# Patient Record
Sex: MALE | Race: WHITE | Employment: FULL TIME | URBAN - METROPOLITAN AREA
[De-identification: names, ages, dates, MRNs, and addresses within clinical notes are randomized per-mention and may not be internally consistent; named-entity substitution may affect disease eponyms.]

---

## 2017-01-08 DIAGNOSIS — E78.5 HYPERLIPIDEMIA: ICD-10-CM

## 2017-01-09 RX ORDER — METOPROLOL SUCCINATE 100 MG/1
TABLET, EXTENDED RELEASE ORAL
Qty: 90 TABLET | Refills: 1 | Status: SHIPPED | OUTPATIENT
Start: 2017-01-09 | End: 2017-09-24 | Stop reason: SDUPTHER

## 2017-01-09 RX ORDER — ATORVASTATIN CALCIUM 20 MG/1
TABLET, FILM COATED ORAL
Qty: 90 TABLET | Refills: 1 | Status: SHIPPED | OUTPATIENT
Start: 2017-01-09 | End: 2017-09-25 | Stop reason: SDUPTHER

## 2017-01-09 RX ORDER — TAMSULOSIN HYDROCHLORIDE 0.4 MG/1
CAPSULE ORAL
Qty: 30 CAPSULE | Refills: 5 | Status: SHIPPED | OUTPATIENT
Start: 2017-01-09 | End: 2017-09-25 | Stop reason: SDUPTHER

## 2017-05-18 ENCOUNTER — HOSPITAL ENCOUNTER (OUTPATIENT)
Dept: ORTHOPEDIC SURGERY | Age: 62
Discharge: HOME | End: 2017-05-18
Payer: COMMERCIAL

## 2017-05-18 DIAGNOSIS — M17.12 LOCALIZED PRIMARY OSTEOARTHRITIS OF LOWER LEG, LEFT: ICD-10-CM

## 2017-05-18 PROCEDURE — 73562 X-RAY EXAM OF KNEE 3: CPT

## 2017-06-18 DIAGNOSIS — J04.0 LARYNGITIS FROM REFLUX OF STOMACH ACID: ICD-10-CM

## 2017-06-18 DIAGNOSIS — K21.9 LARYNGITIS FROM REFLUX OF STOMACH ACID: ICD-10-CM

## 2017-06-19 RX ORDER — OMEPRAZOLE 20 MG/1
CAPSULE, DELAYED RELEASE ORAL
Qty: 270 CAPSULE | Refills: 1 | Status: SHIPPED | OUTPATIENT
Start: 2017-06-19 | End: 2018-02-25 | Stop reason: SDUPTHER

## 2017-06-19 RX ORDER — CELECOXIB 200 MG/1
CAPSULE ORAL
Qty: 180 CAPSULE | Refills: 1 | Status: SHIPPED | OUTPATIENT
Start: 2017-06-19 | End: 2018-02-22 | Stop reason: ALTCHOICE

## 2017-08-24 ENCOUNTER — CARE COORDINATION (OUTPATIENT)
Dept: CARE COORDINATION | Age: 62
End: 2017-08-24

## 2017-08-28 ENCOUNTER — CARE COORDINATION (OUTPATIENT)
Dept: CARE COORDINATION | Age: 62
End: 2017-08-28

## 2017-09-25 ENCOUNTER — OFFICE VISIT (OUTPATIENT)
Dept: FAMILY MEDICINE CLINIC | Age: 62
End: 2017-09-25

## 2017-09-25 VITALS
HEART RATE: 72 BPM | RESPIRATION RATE: 14 BRPM | DIASTOLIC BLOOD PRESSURE: 80 MMHG | WEIGHT: 200 LBS | HEIGHT: 68 IN | TEMPERATURE: 98.1 F | SYSTOLIC BLOOD PRESSURE: 124 MMHG | BODY MASS INDEX: 30.31 KG/M2

## 2017-09-25 DIAGNOSIS — Z12.5 PROSTATE CANCER SCREENING: ICD-10-CM

## 2017-09-25 DIAGNOSIS — Z12.11 SCREENING FOR COLORECTAL CANCER: ICD-10-CM

## 2017-09-25 DIAGNOSIS — I10 ESSENTIAL HYPERTENSION: Primary | ICD-10-CM

## 2017-09-25 DIAGNOSIS — Z12.12 SCREENING FOR COLORECTAL CANCER: ICD-10-CM

## 2017-09-25 DIAGNOSIS — E78.5 HYPERLIPIDEMIA, UNSPECIFIED HYPERLIPIDEMIA TYPE: ICD-10-CM

## 2017-09-25 DIAGNOSIS — E78.2 MIXED HYPERLIPIDEMIA: ICD-10-CM

## 2017-09-25 DIAGNOSIS — I10 ESSENTIAL HYPERTENSION: ICD-10-CM

## 2017-09-25 DIAGNOSIS — I50.9 CONGESTIVE HEART FAILURE, UNSPECIFIED CONGESTIVE HEART FAILURE CHRONICITY, UNSPECIFIED CONGESTIVE HEART FAILURE TYPE: ICD-10-CM

## 2017-09-25 DIAGNOSIS — K21.9 GASTROESOPHAGEAL REFLUX DISEASE, ESOPHAGITIS PRESENCE NOT SPECIFIED: ICD-10-CM

## 2017-09-25 DIAGNOSIS — G47.33 OBSTRUCTIVE SLEEP APNEA SYNDROME: ICD-10-CM

## 2017-09-25 DIAGNOSIS — F32.A DEPRESSION, UNSPECIFIED DEPRESSION TYPE: ICD-10-CM

## 2017-09-25 LAB
ALBUMIN SERPL-MCNC: 4.3 G/DL (ref 3.9–4.9)
ALP BLD-CCNC: 84 U/L (ref 35–104)
ALT SERPL-CCNC: 24 U/L (ref 0–41)
ANION GAP SERPL CALCULATED.3IONS-SCNC: 14 MEQ/L (ref 7–13)
AST SERPL-CCNC: 23 U/L (ref 0–40)
BILIRUB SERPL-MCNC: 0.4 MG/DL (ref 0–1.2)
BUN BLDV-MCNC: 25 MG/DL (ref 8–23)
CALCIUM SERPL-MCNC: 9.5 MG/DL (ref 8.6–10.2)
CHLORIDE BLD-SCNC: 105 MEQ/L (ref 98–107)
CHOLESTEROL, TOTAL: 144 MG/DL (ref 0–199)
CO2: 25 MEQ/L (ref 22–29)
CREAT SERPL-MCNC: 0.79 MG/DL (ref 0.7–1.2)
GFR AFRICAN AMERICAN: >60
GFR NON-AFRICAN AMERICAN: >60
GLOBULIN: 2.3 G/DL (ref 2.3–3.5)
GLUCOSE BLD-MCNC: 82 MG/DL (ref 74–109)
HCT VFR BLD CALC: 44.6 % (ref 42–52)
HDLC SERPL-MCNC: 48 MG/DL (ref 40–59)
HEMOGLOBIN: 14.6 G/DL (ref 14–18)
LDL CHOLESTEROL CALCULATED: 76 MG/DL (ref 0–129)
MCH RBC QN AUTO: 29.1 PG (ref 27–31.3)
MCHC RBC AUTO-ENTMCNC: 32.7 % (ref 33–37)
MCV RBC AUTO: 89.1 FL (ref 80–100)
PDW BLD-RTO: 14.4 % (ref 11.5–14.5)
PLATELET # BLD: 239 K/UL (ref 130–400)
POTASSIUM SERPL-SCNC: 4.9 MEQ/L (ref 3.5–5.1)
PROSTATE SPECIFIC ANTIGEN: 3.89 NG/ML (ref 0–5.4)
RBC # BLD: 5.01 M/UL (ref 4.7–6.1)
SODIUM BLD-SCNC: 144 MEQ/L (ref 132–144)
TOTAL PROTEIN: 6.6 G/DL (ref 6.4–8.1)
TRIGL SERPL-MCNC: 102 MG/DL (ref 0–200)
WBC # BLD: 7.7 K/UL (ref 4.8–10.8)

## 2017-09-25 PROCEDURE — 99214 OFFICE O/P EST MOD 30 MIN: CPT | Performed by: FAMILY MEDICINE

## 2017-09-25 RX ORDER — TAMSULOSIN HYDROCHLORIDE 0.4 MG/1
0.4 CAPSULE ORAL DAILY
Qty: 90 CAPSULE | Refills: 3 | Status: SHIPPED | OUTPATIENT
Start: 2017-09-25 | End: 2018-10-28 | Stop reason: SDUPTHER

## 2017-09-25 RX ORDER — METOPROLOL SUCCINATE 100 MG/1
TABLET, EXTENDED RELEASE ORAL
Qty: 90 TABLET | Refills: 1 | Status: SHIPPED | OUTPATIENT
Start: 2017-09-25 | End: 2018-02-25 | Stop reason: SDUPTHER

## 2017-09-25 RX ORDER — ATORVASTATIN CALCIUM 20 MG/1
20 TABLET, FILM COATED ORAL DAILY
Qty: 90 TABLET | Refills: 3 | Status: SHIPPED | OUTPATIENT
Start: 2017-09-25 | End: 2018-04-24 | Stop reason: CLARIF

## 2017-09-25 ASSESSMENT — PATIENT HEALTH QUESTIONNAIRE - PHQ9
SUM OF ALL RESPONSES TO PHQ9 QUESTIONS 1 & 2: 0
2. FEELING DOWN, DEPRESSED OR HOPELESS: 0
1. LITTLE INTEREST OR PLEASURE IN DOING THINGS: 0
SUM OF ALL RESPONSES TO PHQ QUESTIONS 1-9: 0

## 2017-10-06 ENCOUNTER — HOSPITAL ENCOUNTER (OUTPATIENT)
Dept: SLEEP CENTER | Age: 62
Discharge: HOME | End: 2017-10-06
Payer: COMMERCIAL

## 2017-10-06 PROCEDURE — 95810 POLYSOM 6/> YRS 4/> PARAM: CPT

## 2017-10-26 ENCOUNTER — TELEPHONE (OUTPATIENT)
Dept: FAMILY MEDICINE CLINIC | Age: 62
End: 2017-10-26

## 2018-01-11 ENCOUNTER — HOSPITAL ENCOUNTER (OUTPATIENT)
Dept: GENERAL RADIOLOGY | Age: 63
Discharge: HOME | End: 2018-01-11
Payer: COMMERCIAL

## 2018-01-11 ENCOUNTER — OFFICE VISIT (OUTPATIENT)
Dept: FAMILY MEDICINE CLINIC | Age: 63
End: 2018-01-11

## 2018-01-11 VITALS
RESPIRATION RATE: 16 BRPM | HEART RATE: 74 BPM | BODY MASS INDEX: 30.68 KG/M2 | WEIGHT: 202.4 LBS | SYSTOLIC BLOOD PRESSURE: 120 MMHG | OXYGEN SATURATION: 97 % | DIASTOLIC BLOOD PRESSURE: 74 MMHG | TEMPERATURE: 97.6 F | HEIGHT: 68 IN

## 2018-01-11 DIAGNOSIS — R09.89 LUNG CRACKLES: ICD-10-CM

## 2018-01-11 DIAGNOSIS — J34.89 SINUS PRESSURE: ICD-10-CM

## 2018-01-11 DIAGNOSIS — J06.9 VIRAL URI: Primary | ICD-10-CM

## 2018-01-11 DIAGNOSIS — R05.9 COUGH: ICD-10-CM

## 2018-01-11 PROCEDURE — 99213 OFFICE O/P EST LOW 20 MIN: CPT | Performed by: NURSE PRACTITIONER

## 2018-01-11 PROCEDURE — 71046 X-RAY EXAM CHEST 2 VIEWS: CPT

## 2018-01-11 ASSESSMENT — ENCOUNTER SYMPTOMS
EYE REDNESS: 0
EYE DISCHARGE: 0
COUGH: 1
SINUS PAIN: 1
SHORTNESS OF BREATH: 0
RHINORRHEA: 1
EYE PAIN: 0
SINUS PRESSURE: 1
SORE THROAT: 1
DIARRHEA: 0
CHEST TIGHTNESS: 0
NAUSEA: 0
WHEEZING: 0
VOMITING: 0
EYE ITCHING: 0
CONSTIPATION: 0

## 2018-01-11 NOTE — LETTER
Silas Summers PCP  [address] [address]  Phone: [phone]  Fax: [phone]    Mary Patel NP        January 11, 2018     Patient: Patricia Georges   YOB: 1955   Date of Visit: 1/11/2018       To Whom it May Concern:    Patricia Georges was seen in my clinic on 1/11/2018. He  Is excused from work yesterday, 1/10/2018 and today, 1/11/2018. If you have any questions or concerns, please don't hesitate to call.     Sincerely,         Mary Patel NP

## 2018-01-11 NOTE — PROGRESS NOTES
Subjective:      Patient ID: Negro Lane is a 63 y.o. male who presents today for:  Chief Complaint   Patient presents with    URI     Cough, congestion, sore throat x3 days       HPI      Complains of:cough with thick mucous production, cough is keeping him up at night it is worse at night. chest congestion, sore throat, when he was first sick he had fever chills but those have subsided. Patient denies having CHF and states it must have been an error in his chart. Symptoms started: 3 days    Denies symptoms of: N/V/D/C, chest pain, shortness of breath, fever, chills, thyroid disease. Symptoms are: Worsening     Tried the following OTC medication: Mucinex Pseudofed and Vitamins    Improvement with OTC medication: Mild    Relieving factors: Nothing    Aggravating factors: Laying down    Past Medical History:   Diagnosis Date    Anemia     CHF (congestive heart failure) (HCC)     Depression     GERD (gastroesophageal reflux disease)     Hyperlipidemia     Hypertension     Insomnia     Osteoarthritis     Osteoarthritis of spine with radiculopathy, lumbar region 1/19/2016     Past Surgical History:   Procedure Laterality Date    BACK SURGERY  2005    CARPAL TUNNEL RELEASE Bilateral 2005    COLONOSCOPY  3-2014-PROBABLY 08 not14    (-), 2008 POLYPS    KNEE ARTHROSCOPY Bilateral 2014    LUMBAR LAMINECTOMY  2/24/16    DR. Li 78     ROTATOR CUFF REPAIR Right 2006    TONSILLECTOMY  1964    TOTAL KNEE ARTHROPLASTY Right 11-3-14     Family History   Problem Relation Age of Onset    High Blood Pressure Father      Social History     Social History    Marital status:      Spouse name: N/A    Number of children: N/A    Years of education: N/A     Occupational History    Not on file.      Social History Main Topics    Smoking status: Current Every Day Smoker     Start date: 9/26/2012    Smokeless tobacco: Never Used    Alcohol use No    Drug use: No    Sexual activity: Yes     Partners: Monitoring Report for this patient was reviewed today. Chidi Mendenhall NP)  Documentation: No signs of potential drug abuse or diversion identified. Chidi Mendenhall NP)    Return if symptoms worsen or fail to improve. Reviewed with the patient: current clinical status, medications, activities and diet. Side effects, adverse effects of the medication prescribed today, as well as treatment plan/ rationale and result expectations have been discussed with the patient who expresses understanding and desires to proceed. Close follow up to evaluate treatment results and for coordination of care. I have reviewed the patient's medical history in detail and updated the computerized patient record.     Chidi Mendenhall NP

## 2018-02-22 ENCOUNTER — HOSPITAL ENCOUNTER (EMERGENCY)
Age: 63
Discharge: HOME | End: 2018-02-22
Payer: COMMERCIAL

## 2018-02-22 ENCOUNTER — APPOINTMENT (OUTPATIENT)
Dept: GENERAL RADIOLOGY | Age: 63
End: 2018-02-22
Payer: COMMERCIAL

## 2018-02-22 VITALS
TEMPERATURE: 98 F | WEIGHT: 200 LBS | OXYGEN SATURATION: 96 % | RESPIRATION RATE: 18 BRPM | HEART RATE: 68 BPM | HEIGHT: 69 IN | SYSTOLIC BLOOD PRESSURE: 132 MMHG | DIASTOLIC BLOOD PRESSURE: 85 MMHG | BODY MASS INDEX: 29.62 KG/M2

## 2018-02-22 DIAGNOSIS — S69.91XA INJURY OF RIGHT HAND, INITIAL ENCOUNTER: Primary | ICD-10-CM

## 2018-02-22 DIAGNOSIS — S60.221A CONTUSION OF RIGHT HAND, INITIAL ENCOUNTER: ICD-10-CM

## 2018-02-22 PROCEDURE — 73130 X-RAY EXAM OF HAND: CPT

## 2018-02-22 PROCEDURE — 99283 EMERGENCY DEPT VISIT LOW MDM: CPT

## 2018-02-22 RX ORDER — ETODOLAC 400 MG/1
400 TABLET, EXTENDED RELEASE ORAL DAILY
Qty: 30 TABLET | Refills: 3 | Status: SHIPPED | OUTPATIENT
Start: 2018-02-22 | End: 2018-04-24 | Stop reason: CLARIF

## 2018-02-22 RX ORDER — ACETAMINOPHEN 325 MG/1
650 TABLET ORAL EVERY 4 HOURS PRN
Qty: 42 TABLET | Refills: 0 | Status: SHIPPED | OUTPATIENT
Start: 2018-02-22 | End: 2018-04-24 | Stop reason: CLARIF

## 2018-02-22 ASSESSMENT — PAIN DESCRIPTION - PAIN TYPE: TYPE: ACUTE PAIN

## 2018-02-22 ASSESSMENT — PAIN SCALES - GENERAL: PAINLEVEL_OUTOF10: 3

## 2018-02-22 ASSESSMENT — ENCOUNTER SYMPTOMS
ABDOMINAL PAIN: 0
SHORTNESS OF BREATH: 0

## 2018-02-22 ASSESSMENT — PAIN DESCRIPTION - LOCATION: LOCATION: FINGER (COMMENT WHICH ONE)

## 2018-02-22 ASSESSMENT — PAIN DESCRIPTION - ORIENTATION: ORIENTATION: RIGHT

## 2018-02-23 ASSESSMENT — ENCOUNTER SYMPTOMS
DIARRHEA: 0
CONSTIPATION: 0
VOMITING: 0
VOICE CHANGE: 0
NAUSEA: 0
SORE THROAT: 0
COLOR CHANGE: 0
BACK PAIN: 0
TROUBLE SWALLOWING: 0
COUGH: 0

## 2018-02-23 NOTE — ED TRIAGE NOTES
Pt injured right 2nd finger at work today, this is worker's comp, it got bent back and then went into \"trigger finger\".

## 2018-02-23 NOTE — ED PROVIDER NOTES
[address] [facility] ED  eMERGENCY dEPARTMENT eNCOUnter      Pt Name: Ashlyn Maldonado  MRN: [id number]  [place] 1955  Date of evaluation: 2/22/2018  Provider: Rozina Azul NP     [address] [facility]       Chief Complaint   Patient presents with    Hand Injury     right 2nd finger       HISTORY OF PRESENT ILLNESS   (Location/Symptom, Timing/Onset, Context/Setting, Quality, Duration, Modifying Factors, Severity) Note limiting factors. The history is provided by the patient. No  was used. Hand Injury   This is a new problem. The current episode started 1 to 2 hours ago. The problem occurs rarely. The problem has not changed since onset. Pertinent negatives include no chest pain, no abdominal pain, no headaches and no shortness of breath. Exacerbated by: touching the distal joint of the ring finger on right hand. Nothing relieves the symptoms. He has tried nothing for the symptoms. Ashlyn Maldonado is a 63year old male patient per chart review with a history of hyperlipidemia, HTN, depression, GERD, OA, CHF, laryngitis, lumbar stenosis, lumbar degenerative disc disease, and OA of the spine. Since he room today with complaints of right ring finger pain after getting it stuck in something at work and the finger bending back to his hand since states that the pain is more so in the distal joint of the right ring finger scribes it as a constant aching that becomes more painful with touch. He denies any wrist pain. He is able to bend all of his fingers and has full range of motion. He denies any associated symptoms. Nursing Notes were reviewed. REVIEW OF SYSTEMS    (2+ for level 4; 10+ for level 5)     Review of Systems   Constitutional: Negative for appetite change and fever. HENT: Negative for drooling, ear pain, sore throat, trouble swallowing and voice change. Respiratory: Negative for cough and shortness of breath. Cardiovascular: Negative for chest pain. daily.      magnesium oxide (MAG-OX) 400 MG tablet Take 400 mg by mouth daily. Ascorbic Acid (VITAMIN C) 1000 MG tablet Take 1,000 mg by mouth daily. B Complex Vitamins (VITAMIN-B COMPLEX PO) Take  by mouth 2 times daily. Misc Natural Products (OSTEO BI-FLEX JOINT SHIELD PO) Take  by mouth daily. Omega-3 Fatty Acids (FISH OIL) 1200 MG CAPS Take  by mouth 2 times daily. celecoxib (CELEBREX) 200 MG capsule TAKE ONE CAPSULE BY MOUTH TWICE A DAY, Disp-180 capsule, R-1Normal      cetirizine (ZYRTEC) 10 MG tablet Take 10 mg by mouth daily. Melatonin 10 MG TABS Take  by mouth daily. ALLERGIES     Review of patient's allergies indicates no known allergies. FAMILY HISTORY       Family History   Problem Relation Age of Onset    High Blood Pressure Father           SOCIAL HISTORY       Social History     Social History    Marital status:      Spouse name: N/A    Number of children: N/A    Years of education: N/A     Social History Main Topics    Smoking status: Current Every Day Smoker     Packs/day: 0.50     Types: Cigarettes     Start date: 9/26/2012    Smokeless tobacco: Never Used    Alcohol use No    Drug use: No    Sexual activity: Yes     Partners: Female     Other Topics Concern    None     Social History Narrative    None       SCREENINGS           PHYSICAL EXAM    (up to 7 for level 4, 8 or more for level 5)     ED Triage Vitals [02/22/18 2029]   BP Temp Temp Source Pulse Resp SpO2 Height Weight   132/85 98 °F (36.7 °C) Oral 68 18 96 % 5' 9\" (1.753 m) 200 lb (90.7 kg)       Physical Exam   Constitutional: He is oriented to person, place, and time. He appears well-developed and well-nourished. No distress. HENT:   Head: Normocephalic and atraumatic. Eyes: Conjunctivae are normal. Right eye exhibits no discharge. Left eye exhibits no discharge. Neck: Normal range of motion. Neck supple. Cardiovascular: Normal rate and regular rhythm. Pulmonary/Chest: Effort normal and breath sounds normal.   Abdominal: Soft. Bowel sounds are normal.   Musculoskeletal: Normal range of motion. He exhibits tenderness (DIP of right ring finger). Neurological: He is alert and oriented to person, place, and time. Skin: Skin is warm and dry. Psychiatric: He has a normal mood and affect. Nursing note and vitals reviewed. DIAGNOSTIC RESULTS     EKG: All EKG's are interpreted by the Emergency Department Physician who either signs or Co-signs this chart in the absence of a cardiologist.        RADIOLOGY:   Non-plain film images such as CT, Ultrasound and MRI are read by the radiologist. Plain radiographic images are visualized and preliminarily interpreted by the emergency physician with the below findings:    Patient's x-ray shows no acute fracture or dislocation. Interpretation per the Radiologist below (if available at the time of note entry):    XR HAND RIGHT (MIN 3 VIEWS)    (Results Pending)       LABS:  Labs Reviewed - No data to display    All other labs were within normal range or not returned as of this dictation. EMERGENCY DEPARTMENT COURSE and DIFFERENTIAL DIAGNOSIS/MDM:   Vitals:    Vitals:    02/22/18 2029   BP: 132/85   Pulse: 68   Resp: 18   Temp: 98 °F (36.7 °C)   TempSrc: Oral   SpO2: 96%   Weight: 200 lb (90.7 kg)   Height: 5' 9\" (1.753 m)       MDM    Patient presents to the ER after having a work related injury to the right ring finger when he got it caught in something causing the finger to bend backwards. His pain is primarily in the DIP joint of the right ring finger. His xrays are negative for acute fracture or dislocation. The patient was provided with a brace for comfort. He will be discharged home with a prescription for lodine and has been instructed on the side effects of the medication and instructed to discontinue taking his celebrex if he is going to take the lodine and not to take the two together.  The patient has been

## 2018-02-25 DIAGNOSIS — K21.9 LARYNGITIS FROM REFLUX OF STOMACH ACID: ICD-10-CM

## 2018-02-25 DIAGNOSIS — J04.0 LARYNGITIS FROM REFLUX OF STOMACH ACID: ICD-10-CM

## 2018-02-25 DIAGNOSIS — E78.5 HYPERLIPIDEMIA: ICD-10-CM

## 2018-02-27 RX ORDER — METOPROLOL SUCCINATE 100 MG/1
TABLET, EXTENDED RELEASE ORAL
Qty: 90 TABLET | Refills: 1 | Status: SHIPPED | OUTPATIENT
Start: 2018-02-27 | End: 2018-09-17 | Stop reason: SDUPTHER

## 2018-02-27 RX ORDER — OMEPRAZOLE 20 MG/1
CAPSULE, DELAYED RELEASE ORAL
Qty: 270 CAPSULE | Refills: 1 | Status: SHIPPED | OUTPATIENT
Start: 2018-02-27 | End: 2018-10-28 | Stop reason: SDUPTHER

## 2018-02-27 RX ORDER — ATORVASTATIN CALCIUM 20 MG/1
TABLET, FILM COATED ORAL
Qty: 90 TABLET | Refills: 1 | Status: SHIPPED | OUTPATIENT
Start: 2018-02-27 | End: 2018-09-30 | Stop reason: SDUPTHER

## 2018-03-21 ENCOUNTER — HOSPITAL ENCOUNTER (OUTPATIENT)
Dept: ORTHOPEDIC SURGERY | Age: 63
Discharge: HOME | End: 2018-03-23
Payer: COMMERCIAL

## 2018-03-21 DIAGNOSIS — M17.11 PRIMARY LOCALIZED OSTEOARTHROSIS OF RIGHT LOWER LEG: ICD-10-CM

## 2018-03-21 DIAGNOSIS — M17.12 PRIMARY LOCALIZED OSTEOARTHROSIS OF LEFT LOWER LEG: ICD-10-CM

## 2018-03-21 PROCEDURE — 73562 X-RAY EXAM OF KNEE 3: CPT

## 2018-03-21 PROCEDURE — 73564 X-RAY EXAM KNEE 4 OR MORE: CPT

## 2018-03-26 RX ORDER — CELECOXIB 200 MG/1
CAPSULE ORAL
Qty: 180 CAPSULE | Refills: 0 | Status: SHIPPED | OUTPATIENT
Start: 2018-03-26 | End: 2018-07-22 | Stop reason: SDUPTHER

## 2018-03-30 ENCOUNTER — HOSPITAL ENCOUNTER (OUTPATIENT)
Dept: PHYSICAL THERAPY | Age: 63
Setting detail: THERAPIES SERIES
Discharge: HOME | End: 2018-03-30
Payer: COMMERCIAL

## 2018-03-30 PROCEDURE — 97161 PT EVAL LOW COMPLEX 20 MIN: CPT

## 2018-03-30 ASSESSMENT — PAIN DESCRIPTION - ORIENTATION: ORIENTATION: LEFT

## 2018-03-30 ASSESSMENT — PAIN DESCRIPTION - LOCATION: LOCATION: KNEE

## 2018-03-30 ASSESSMENT — PAIN SCALES - GENERAL: PAINLEVEL_OUTOF10: 3

## 2018-03-30 ASSESSMENT — PAIN DESCRIPTION - PROGRESSION: CLINICAL_PROGRESSION: GRADUALLY WORSENING

## 2018-03-30 ASSESSMENT — PAIN DESCRIPTION - DESCRIPTORS: DESCRIPTORS: ACHING

## 2018-03-30 ASSESSMENT — PAIN DESCRIPTION - FREQUENCY: FREQUENCY: CONTINUOUS

## 2018-03-30 ASSESSMENT — PAIN DESCRIPTION - PAIN TYPE: TYPE: CHRONIC PAIN

## 2018-03-30 ASSESSMENT — PAIN DESCRIPTION - ONSET: ONSET: GRADUAL

## 2018-03-30 NOTE — PROGRESS NOTES
[address]  PHYSICAL THERAPY EVALUATION    Date: 3/30/2018  Patient Name: Bita Alvarado       MRN: [id number]   Account: [de-identified]   : 1955  (66 y.o.)   Gender: male   Referring Practitioner: Sea Barahona                 Diagnosis: knee OA; L  Treatment Diagnosis: L knee pain, decreased B LE L>R strength, SLS, hip flexor and HS flexibility and (+) L varus/valgus stress  Additional Pertinent Hx: R TKR; lumbar spinal fusion x2         Past Medical History:  has a past medical history of Anemia; CHF (congestive heart failure) ([facility] [address] Depression; GERD (gastroesophageal reflux disease); Hyperlipidemia; Hypertension; Insomnia; Osteoarthritis; and Osteoarthritis of spine with radiculopathy, lumbar region. Past Surgical History:   has a past surgical history that includes Knee arthroscopy (Bilateral, ); Rotator cuff repair (Right, ); back surgery (); Carpal tunnel release (Bilateral, ); Tonsillectomy (); Total knee arthroplasty (Right, 11-3-14); Colonoscopy (3-2014-PROBABLY  not14); and lumbar laminectomy (16). Vital Signs  Patient Currently in Pain: Yes   Pain Screening  Patient Currently in Pain: Yes  Pain Assessment  Pain Assessment: 0-10  Pain Level: 3 (Pain ranges from 0-10/10 )  Pain Type: Chronic pain  Pain Location: Knee  Pain Orientation: Left  Pain Descriptors: Aching  Pain Frequency: Continuous  Pain Onset: Gradual  Clinical Progression: Gradually worsening  Effect of Pain on Daily Activities: Increased pain/difficulty with ambulating prolonged distances, umpiring, stairs, standing prolonged periods, transfers including chair and car  Patient's Stated Pain Goal: No pain  Pain Intervention(s): Medication (see eMar); Rest     Lives With: Spouse  Type of Home: House  Home Layout: Laundry in basement  Home Access: Stairs to enter with rails  Entrance Stairs - Number of Steps: 3  ADL Assistance: Independent  Homemaking Assistance: (0-10 pain scale): 3/10   Location and pain description same as pre-treatment unless indicated. Action: [] NA  [] Call Physician  [] Perform HEP  [x] Meds as prescribed    Evaluation and patient rights have been reviewed and patient agrees with plan of care. Yes  [x]  No  []   Explain:     Miriam Fall Risk Assessment  Risk Factor Scale  Score   History of Falls [x] Yes  [] No 25  0 25   Secondary Diagnosis [] Yes  [x] No 15  0    Ambulatory Aid [] Furniture  [] Crutches/cane/walker  [x] None/bedrest/wheelchair/nurse 30  15  0    IV/Heparin Lock [] Yes  [x] No 20  0    Gait/Transferring [] Impaired  [] Weak  [x] Normal/bedrest/immobile 20  10  0    Mental Status [] Forgets limitations  [x] Oriented to own ability 15  0       Total: 25     Based on the Assessment score: check the appropriate box. []  No intervention needed   Low =   Score of 0-24  [x]  Use standard prevention interventions Moderate =  Score of 24-44   [x] Discuss fall prevention strategies   [x] Indicate moderate falls risk on eval  []  Use high risk prevention interventions High = Score of 45 and higher   [] Discuss fall prevention strategies   [] Provide supervision during treatment time    Goals  Long term goals  Time Frame for Long term goals : 6 weeks   Long term goal 1: Pt will be indep/compliant with hep in order to self manage symptoms upon D/C  Long term goal 2: The pt will have an increase in LEFS score >/=15 points demonstrating an improvement in functional activity tolerace  Long term goal 3: The pt will demo improved B LE strength >/= 4+ to 5/5 in order to safely umpire at PLOF  Long term goal 4: The pt will demo improved B SLS with min-no frontal plane mvmt >/=15 seocnds in order to increase ambulation tolerance   Long term goal 5:  The pt will demo decreased L HS flexibility </=30* and (-) B elys in order to decrease pain with transfers    PT Individual Minutes  Time In: 1522  Time Out: 1547  Minutes: 25     Procedure Minutes: 25  Electronically signed by Jonatan Noyola PT on 3/30/18 at 3:58 PM

## 2018-03-30 NOTE — PROGRESS NOTES
[address]     Ph: [phone]  Fax: [phone]    [] Certification  [] Recertification [x]  Plan of Care  [] Progress Note [] Discharge      To:  Referring Practitioner: Darrell Archer      From:  Dread Taylor, PT, DPT  Patient: Andrew Rain     : 1955  Diagnosis: knee OA; L     Date: 3/30/2018  Treatment Diagnosis: L knee pain, decreased B LE L>R strength, SLS, hip flexor and HS flexibility and (+) L varus/valgus stress     Progress Report Period from:  3/30/2018  to 3/30/2018    Total # of Visits to Date: 1   No Show: 0    Canceled Appointment: 0     OBJECTIVE:   Long Term Goals - Time Frame for Long term goals : 6 weeks   Goals Current/ Discharge status Met   Long term goal 1: Pt will be indep/compliant with hep in order to self manage symptoms upon D/C ongoing [] yes  [] no   Long term goal 2: The pt will have an increase in LEFS score >/=15 points demonstrating an improvement in functional activity tolerace 40/50 [] yes  [] no   Long term goal 3: The pt will demo improved B LE strength >/= 4+ to 5/5 in order to safely umpire at [facility] Strength RLE  Comment: 4+/5 except Hip IR 5/5, hip flex 4/5, hip abd 4-/5, hip ext 4/5    Strength LLE  Comment: 4+/5 except Hip flex 4-/5, hip ext 4/5   [] yes  [] no   Long term goal 4: The pt will demo improved B SLS with min-no frontal plane mvmt >/=15 seocnds in order to increase ambulation tolerance  L 5\" R7\" with increased frontal plane mvmt B [] yes  [] no   Long term goal 5: The pt will demo decreased L HS flexibility </=30* and (-) B elys in order to decrease pain with transfers L 33 [] yes  [] no      Body structures, Functions, Activity limitations: Decreased functional mobility , Decreased ROM, Decreased strength, Decreased balance  Assessment: Pt presents with a chornic history of L knee pain with progresisve worsening.  He currently demos decreased B LE

## 2018-04-12 DIAGNOSIS — Z13.1 SCREENING FOR DIABETES MELLITUS: ICD-10-CM

## 2018-04-12 DIAGNOSIS — Z23 NEED FOR PNEUMOCOCCAL VACCINATION: Primary | ICD-10-CM

## 2018-04-13 ENCOUNTER — HOSPITAL ENCOUNTER (OUTPATIENT)
Dept: PHYSICAL THERAPY | Age: 63
Setting detail: THERAPIES SERIES
Discharge: HOME | End: 2018-04-13
Payer: COMMERCIAL

## 2018-04-13 PROCEDURE — 97110 THERAPEUTIC EXERCISES: CPT

## 2018-04-13 ASSESSMENT — PAIN DESCRIPTION - LOCATION: LOCATION: KNEE

## 2018-04-13 ASSESSMENT — PAIN SCALES - GENERAL: PAINLEVEL_OUTOF10: 3

## 2018-04-13 ASSESSMENT — PAIN DESCRIPTION - ORIENTATION: ORIENTATION: LEFT

## 2018-04-13 ASSESSMENT — PAIN DESCRIPTION - PROGRESSION: CLINICAL_PROGRESSION: GRADUALLY WORSENING

## 2018-04-18 ENCOUNTER — HOSPITAL ENCOUNTER (OUTPATIENT)
Dept: PHYSICAL THERAPY | Age: 63
Setting detail: THERAPIES SERIES
Discharge: HOME | End: 2018-04-18
Payer: COMMERCIAL

## 2018-04-18 PROCEDURE — 97110 THERAPEUTIC EXERCISES: CPT

## 2018-04-18 ASSESSMENT — PAIN DESCRIPTION - ORIENTATION: ORIENTATION: LEFT

## 2018-04-18 ASSESSMENT — PAIN DESCRIPTION - PAIN TYPE: TYPE: CHRONIC PAIN

## 2018-04-18 ASSESSMENT — PAIN SCALES - GENERAL: PAINLEVEL_OUTOF10: 4

## 2018-04-18 ASSESSMENT — PAIN DESCRIPTION - LOCATION: LOCATION: KNEE

## 2018-04-18 ASSESSMENT — PAIN DESCRIPTION - DESCRIPTORS: DESCRIPTORS: ACHING

## 2018-04-24 ENCOUNTER — OFFICE VISIT (OUTPATIENT)
Dept: FAMILY MEDICINE CLINIC | Age: 63
End: 2018-04-24
Payer: COMMERCIAL

## 2018-04-24 ENCOUNTER — HOSPITAL ENCOUNTER (OUTPATIENT)
Dept: PHYSICAL THERAPY | Age: 63
Setting detail: THERAPIES SERIES
Discharge: HOME | End: 2018-04-24
Payer: COMMERCIAL

## 2018-04-24 ENCOUNTER — APPOINTMENT (OUTPATIENT)
Dept: PHYSICAL THERAPY | Age: 63
End: 2018-04-24
Payer: COMMERCIAL

## 2018-04-24 VITALS
DIASTOLIC BLOOD PRESSURE: 70 MMHG | TEMPERATURE: 98.3 F | BODY MASS INDEX: 30.31 KG/M2 | HEIGHT: 68 IN | HEART RATE: 70 BPM | SYSTOLIC BLOOD PRESSURE: 136 MMHG | WEIGHT: 200 LBS | RESPIRATION RATE: 16 BRPM

## 2018-04-24 DIAGNOSIS — E78.5 HYPERLIPIDEMIA, UNSPECIFIED HYPERLIPIDEMIA TYPE: ICD-10-CM

## 2018-04-24 DIAGNOSIS — R73.9 HYPERGLYCEMIA: ICD-10-CM

## 2018-04-24 DIAGNOSIS — I10 ESSENTIAL HYPERTENSION: Primary | ICD-10-CM

## 2018-04-24 DIAGNOSIS — Z12.11 COLON CANCER SCREENING: ICD-10-CM

## 2018-04-24 DIAGNOSIS — F17.219 NICOTINE DEPENDENCE, CIGARETTES, WITH UNSPECIFIED NICOTINE-INDUCED DISORDERS: ICD-10-CM

## 2018-04-24 DIAGNOSIS — I10 ESSENTIAL HYPERTENSION: ICD-10-CM

## 2018-04-24 LAB
ALBUMIN SERPL-MCNC: 4.6 G/DL (ref 3.9–4.9)
ALP BLD-CCNC: 89 U/L (ref 35–104)
ALT SERPL-CCNC: 25 U/L (ref 0–41)
ANION GAP SERPL CALCULATED.3IONS-SCNC: 14 MEQ/L (ref 7–13)
AST SERPL-CCNC: 26 U/L (ref 0–40)
BILIRUB SERPL-MCNC: 0.8 MG/DL (ref 0–1.2)
BUN BLDV-MCNC: 23 MG/DL (ref 8–23)
CALCIUM SERPL-MCNC: 9.8 MG/DL (ref 8.6–10.2)
CHLORIDE BLD-SCNC: 100 MEQ/L (ref 98–107)
CHOLESTEROL, TOTAL: 168 MG/DL (ref 0–199)
CO2: 25 MEQ/L (ref 22–29)
CREAT SERPL-MCNC: 0.76 MG/DL (ref 0.7–1.2)
GFR AFRICAN AMERICAN: >60
GFR NON-AFRICAN AMERICAN: >60
GLOBULIN: 2.3 G/DL (ref 2.3–3.5)
GLUCOSE BLD-MCNC: 86 MG/DL (ref 74–109)
HBA1C MFR BLD: 6 % (ref 4.8–5.9)
HCT VFR BLD CALC: 44.4 % (ref 42–52)
HDLC SERPL-MCNC: 53 MG/DL (ref 40–59)
HEMOGLOBIN: 15 G/DL (ref 14–18)
LDL CHOLESTEROL CALCULATED: 99 MG/DL (ref 0–129)
MCH RBC QN AUTO: 30.1 PG (ref 27–31.3)
MCHC RBC AUTO-ENTMCNC: 33.9 % (ref 33–37)
MCV RBC AUTO: 88.9 FL (ref 80–100)
PDW BLD-RTO: 14.7 % (ref 11.5–14.5)
PLATELET # BLD: 231 K/UL (ref 130–400)
POTASSIUM SERPL-SCNC: 4.4 MEQ/L (ref 3.5–5.1)
RBC # BLD: 5 M/UL (ref 4.7–6.1)
SODIUM BLD-SCNC: 139 MEQ/L (ref 132–144)
TOTAL PROTEIN: 6.9 G/DL (ref 6.4–8.1)
TRIGL SERPL-MCNC: 81 MG/DL (ref 0–200)
WBC # BLD: 8.2 K/UL (ref 4.8–10.8)

## 2018-04-24 PROCEDURE — 97110 THERAPEUTIC EXERCISES: CPT

## 2018-04-24 PROCEDURE — G0296 VISIT TO DETERM LDCT ELIG: HCPCS | Performed by: FAMILY MEDICINE

## 2018-04-24 PROCEDURE — 99214 OFFICE O/P EST MOD 30 MIN: CPT | Performed by: FAMILY MEDICINE

## 2018-04-24 RX ORDER — AMOXICILLIN 875 MG/1
875 TABLET, COATED ORAL 2 TIMES DAILY
Qty: 20 TABLET | Refills: 0 | Status: SHIPPED | OUTPATIENT
Start: 2018-04-24 | End: 2018-05-04

## 2018-04-24 RX ORDER — FLUTICASONE PROPIONATE 50 MCG
1 SPRAY, SUSPENSION (ML) NASAL DAILY
Qty: 1 BOTTLE | Refills: 3 | Status: SHIPPED | OUTPATIENT
Start: 2018-04-24 | End: 2018-12-12 | Stop reason: SDUPTHER

## 2018-04-24 ASSESSMENT — PAIN DESCRIPTION - FREQUENCY: FREQUENCY: CONTINUOUS

## 2018-04-24 ASSESSMENT — PAIN DESCRIPTION - LOCATION: LOCATION: KNEE

## 2018-04-24 ASSESSMENT — PAIN SCALES - GENERAL: PAINLEVEL_OUTOF10: 3

## 2018-04-24 ASSESSMENT — PAIN DESCRIPTION - ORIENTATION: ORIENTATION: LEFT

## 2018-04-24 ASSESSMENT — PAIN DESCRIPTION - DESCRIPTORS: DESCRIPTORS: ACHING

## 2018-04-26 ENCOUNTER — HOSPITAL ENCOUNTER (OUTPATIENT)
Dept: PHYSICAL THERAPY | Age: 63
Setting detail: THERAPIES SERIES
Discharge: HOME | End: 2018-04-26
Payer: COMMERCIAL

## 2018-04-26 PROCEDURE — 97110 THERAPEUTIC EXERCISES: CPT

## 2018-04-26 ASSESSMENT — PAIN DESCRIPTION - DESCRIPTORS: DESCRIPTORS: SORE

## 2018-04-26 ASSESSMENT — PAIN DESCRIPTION - LOCATION: LOCATION: KNEE;BACK

## 2018-04-26 ASSESSMENT — PAIN DESCRIPTION - PAIN TYPE: TYPE: CHRONIC PAIN

## 2018-04-26 ASSESSMENT — PAIN DESCRIPTION - FREQUENCY: FREQUENCY: CONTINUOUS

## 2018-04-26 ASSESSMENT — PAIN DESCRIPTION - ORIENTATION: ORIENTATION: LEFT;LOWER

## 2018-04-26 ASSESSMENT — PAIN SCALES - GENERAL: PAINLEVEL_OUTOF10: 2

## 2018-05-10 ENCOUNTER — TELEPHONE (OUTPATIENT)
Dept: CASE MANAGEMENT | Age: 63
End: 2018-05-10

## 2018-05-11 ENCOUNTER — TELEPHONE (OUTPATIENT)
Dept: CASE MANAGEMENT | Age: 63
End: 2018-05-11

## 2018-05-14 DIAGNOSIS — F17.219 NICOTINE DEPENDENCE, CIGARETTES, WITH UNSPECIFIED NICOTINE-INDUCED DISORDERS: ICD-10-CM

## 2018-05-15 ENCOUNTER — HOSPITAL ENCOUNTER (OUTPATIENT)
Dept: ULTRASOUND IMAGING | Age: 63
Discharge: HOME | End: 2018-05-17
Payer: COMMERCIAL

## 2018-05-15 ENCOUNTER — HOSPITAL ENCOUNTER (OUTPATIENT)
Dept: CT IMAGING | Age: 63
Discharge: HOME | End: 2018-05-17
Payer: COMMERCIAL

## 2018-05-15 VITALS
RESPIRATION RATE: 16 BRPM | SYSTOLIC BLOOD PRESSURE: 137 MMHG | BODY MASS INDEX: 30.31 KG/M2 | HEIGHT: 68 IN | WEIGHT: 200 LBS | DIASTOLIC BLOOD PRESSURE: 92 MMHG | HEART RATE: 68 BPM

## 2018-05-15 DIAGNOSIS — F17.219 NICOTINE DEPENDENCE, CIGARETTES, WITH UNSPECIFIED NICOTINE-INDUCED DISORDERS: ICD-10-CM

## 2018-05-15 PROCEDURE — G0297 LDCT FOR LUNG CA SCREEN: HCPCS

## 2018-05-15 PROCEDURE — 76706 US ABDL AORTA SCREEN AAA: CPT

## 2018-05-21 ENCOUNTER — OFFICE VISIT (OUTPATIENT)
Dept: FAMILY MEDICINE CLINIC | Age: 63
End: 2018-05-21
Payer: COMMERCIAL

## 2018-05-21 VITALS
RESPIRATION RATE: 16 BRPM | WEIGHT: 203 LBS | DIASTOLIC BLOOD PRESSURE: 84 MMHG | SYSTOLIC BLOOD PRESSURE: 138 MMHG | BODY MASS INDEX: 30.77 KG/M2 | HEART RATE: 70 BPM | TEMPERATURE: 97.9 F | HEIGHT: 68 IN

## 2018-05-21 DIAGNOSIS — I77.810 DILATED AORTIC ROOT (HCC): ICD-10-CM

## 2018-05-21 DIAGNOSIS — Z01.818 PRE-OP EXAMINATION: Primary | ICD-10-CM

## 2018-05-21 DIAGNOSIS — I10 ESSENTIAL HYPERTENSION: ICD-10-CM

## 2018-05-21 DIAGNOSIS — E78.5 HYPERLIPIDEMIA, UNSPECIFIED HYPERLIPIDEMIA TYPE: ICD-10-CM

## 2018-05-21 DIAGNOSIS — Z01.818 PRE-OP EXAMINATION: ICD-10-CM

## 2018-05-21 LAB
ALBUMIN SERPL-MCNC: 4.8 G/DL (ref 3.9–4.9)
ALP BLD-CCNC: 86 U/L (ref 35–104)
ALT SERPL-CCNC: 26 U/L (ref 0–41)
ANION GAP SERPL CALCULATED.3IONS-SCNC: 15 MEQ/L (ref 7–13)
APTT: 27.1 SEC (ref 21.6–35.4)
AST SERPL-CCNC: 25 U/L (ref 0–40)
BASOPHILS ABSOLUTE: 0 K/UL (ref 0–0.2)
BASOPHILS RELATIVE PERCENT: 0.3 %
BILIRUB SERPL-MCNC: 0.3 MG/DL (ref 0–1.2)
BILIRUBIN URINE: NEGATIVE
BLOOD, URINE: NEGATIVE
BUN BLDV-MCNC: 21 MG/DL (ref 8–23)
CALCIUM SERPL-MCNC: 9.5 MG/DL (ref 8.6–10.2)
CHLORIDE BLD-SCNC: 103 MEQ/L (ref 98–107)
CLARITY: CLEAR
CO2: 25 MEQ/L (ref 22–29)
COLOR: YELLOW
CREAT SERPL-MCNC: 0.83 MG/DL (ref 0.7–1.2)
EOSINOPHILS ABSOLUTE: 0.1 K/UL (ref 0–0.7)
EOSINOPHILS RELATIVE PERCENT: 1.7 %
GFR AFRICAN AMERICAN: >60
GFR NON-AFRICAN AMERICAN: >60
GLOBULIN: 2.1 G/DL (ref 2.3–3.5)
GLUCOSE BLD-MCNC: 90 MG/DL (ref 74–109)
GLUCOSE URINE: NEGATIVE MG/DL
HCT VFR BLD CALC: 44.9 % (ref 42–52)
HEMOGLOBIN: 14.9 G/DL (ref 14–18)
INR BLD: 0.9
KETONES, URINE: NEGATIVE MG/DL
LEUKOCYTE ESTERASE, URINE: NEGATIVE
LYMPHOCYTES ABSOLUTE: 2.3 K/UL (ref 1–4.8)
LYMPHOCYTES RELATIVE PERCENT: 30.9 %
MCH RBC QN AUTO: 29.9 PG (ref 27–31.3)
MCHC RBC AUTO-ENTMCNC: 33.2 % (ref 33–37)
MCV RBC AUTO: 90 FL (ref 80–100)
MONOCYTES ABSOLUTE: 0.6 K/UL (ref 0.2–0.8)
MONOCYTES RELATIVE PERCENT: 7.5 %
NEUTROPHILS ABSOLUTE: 4.5 K/UL (ref 1.4–6.5)
NEUTROPHILS RELATIVE PERCENT: 59.6 %
NITRITE, URINE: NEGATIVE
PDW BLD-RTO: 14.7 % (ref 11.5–14.5)
PH UA: 6.5 (ref 5–9)
PLATELET # BLD: 221 K/UL (ref 130–400)
POTASSIUM SERPL-SCNC: 4.7 MEQ/L (ref 3.5–5.1)
PROTEIN UA: NEGATIVE MG/DL
PROTHROMBIN TIME: 9.6 SEC (ref 9.6–12.3)
RBC # BLD: 5 M/UL (ref 4.7–6.1)
SODIUM BLD-SCNC: 143 MEQ/L (ref 132–144)
SPECIFIC GRAVITY UA: 1.01 (ref 1–1.03)
TOTAL PROTEIN: 6.9 G/DL (ref 6.4–8.1)
UROBILINOGEN, URINE: 0.2 E.U./DL
WBC # BLD: 7.5 K/UL (ref 4.8–10.8)

## 2018-05-21 PROCEDURE — 93000 ELECTROCARDIOGRAM COMPLETE: CPT | Performed by: FAMILY MEDICINE

## 2018-05-21 PROCEDURE — 99214 OFFICE O/P EST MOD 30 MIN: CPT | Performed by: FAMILY MEDICINE

## 2018-05-22 LAB — ABO/RH: NORMAL

## 2018-05-26 LAB — URINE CULTURE, ROUTINE: NORMAL

## 2018-06-05 ENCOUNTER — OFFICE VISIT (OUTPATIENT)
Dept: CARDIOLOGY CLINIC | Age: 63
End: 2018-06-05
Payer: COMMERCIAL

## 2018-06-05 VITALS
SYSTOLIC BLOOD PRESSURE: 128 MMHG | OXYGEN SATURATION: 96 % | WEIGHT: 199.2 LBS | BODY MASS INDEX: 30.19 KG/M2 | RESPIRATION RATE: 20 BRPM | HEART RATE: 73 BPM | TEMPERATURE: 97.6 F | DIASTOLIC BLOOD PRESSURE: 82 MMHG | HEIGHT: 68 IN

## 2018-06-05 DIAGNOSIS — I10 ESSENTIAL HYPERTENSION: ICD-10-CM

## 2018-06-05 DIAGNOSIS — Z01.810 PREOP CARDIOVASCULAR EXAM: ICD-10-CM

## 2018-06-05 DIAGNOSIS — I50.9 CONGESTIVE HEART FAILURE, UNSPECIFIED CONGESTIVE HEART FAILURE CHRONICITY, UNSPECIFIED CONGESTIVE HEART FAILURE TYPE: Primary | ICD-10-CM

## 2018-06-05 PROCEDURE — 99244 OFF/OP CNSLTJ NEW/EST MOD 40: CPT | Performed by: INTERNAL MEDICINE

## 2018-06-05 ASSESSMENT — ENCOUNTER SYMPTOMS
WHEEZING: 0
SORE THROAT: 1
FACIAL SWELLING: 0
TROUBLE SWALLOWING: 0
APNEA: 0
BLOOD IN STOOL: 0
DIARRHEA: 0
ANAL BLEEDING: 0
VOMITING: 0
CHEST TIGHTNESS: 0
ABDOMINAL DISTENTION: 0
COLOR CHANGE: 0
NAUSEA: 0
SHORTNESS OF BREATH: 0
VOICE CHANGE: 0

## 2018-06-13 ENCOUNTER — HOSPITAL ENCOUNTER (OUTPATIENT)
Dept: PREADMISSION TESTING | Age: 63
Discharge: HOME | DRG: 470 | End: 2018-06-17
Payer: COMMERCIAL

## 2018-06-13 ENCOUNTER — HOSPITAL ENCOUNTER (OUTPATIENT)
Dept: NUCLEAR MEDICINE | Age: 63
Discharge: HOME | End: 2018-06-15
Payer: COMMERCIAL

## 2018-06-13 ENCOUNTER — HOSPITAL ENCOUNTER (OUTPATIENT)
Dept: NON INVASIVE DIAGNOSTICS | Age: 63
Discharge: HOME | End: 2018-06-13
Payer: COMMERCIAL

## 2018-06-13 VITALS
HEIGHT: 69 IN | DIASTOLIC BLOOD PRESSURE: 80 MMHG | SYSTOLIC BLOOD PRESSURE: 127 MMHG | RESPIRATION RATE: 16 BRPM | TEMPERATURE: 97.7 F | OXYGEN SATURATION: 97 % | BODY MASS INDEX: 29.27 KG/M2 | HEART RATE: 70 BPM | WEIGHT: 197.6 LBS

## 2018-06-13 DIAGNOSIS — Z01.810 PREOP CARDIOVASCULAR EXAM: ICD-10-CM

## 2018-06-13 DIAGNOSIS — I50.9 CONGESTIVE HEART FAILURE, UNSPECIFIED CONGESTIVE HEART FAILURE CHRONICITY, UNSPECIFIED CONGESTIVE HEART FAILURE TYPE: ICD-10-CM

## 2018-06-13 DIAGNOSIS — I10 ESSENTIAL HYPERTENSION: ICD-10-CM

## 2018-06-13 LAB
ABO/RH: NORMAL
ANTIBODY SCREEN: NORMAL
LV EF: 65 %
LVEF MODALITY: NORMAL

## 2018-06-13 PROCEDURE — 3430000000 HC RX DIAGNOSTIC RADIOPHARMACEUTICAL: Performed by: INTERNAL MEDICINE

## 2018-06-13 PROCEDURE — 2580000003 HC RX 258: Performed by: INTERNAL MEDICINE

## 2018-06-13 PROCEDURE — 78452 HT MUSCLE IMAGE SPECT MULT: CPT

## 2018-06-13 PROCEDURE — A9502 TC99M TETROFOSMIN: HCPCS | Performed by: INTERNAL MEDICINE

## 2018-06-13 PROCEDURE — 93017 CV STRESS TEST TRACING ONLY: CPT

## 2018-06-13 PROCEDURE — 86901 BLOOD TYPING SEROLOGIC RH(D): CPT

## 2018-06-13 PROCEDURE — 86900 BLOOD TYPING SEROLOGIC ABO: CPT

## 2018-06-13 PROCEDURE — 86850 RBC ANTIBODY SCREEN: CPT

## 2018-06-13 PROCEDURE — 93306 TTE W/DOPPLER COMPLETE: CPT

## 2018-06-13 PROCEDURE — 6360000002 HC RX W HCPCS: Performed by: INTERNAL MEDICINE

## 2018-06-13 RX ORDER — SODIUM CHLORIDE 0.9 % (FLUSH) 0.9 %
10 SYRINGE (ML) INJECTION PRN
Status: DISCONTINUED | OUTPATIENT
Start: 2018-06-13 | End: 2018-06-16 | Stop reason: HOSPADM

## 2018-06-13 RX ORDER — SODIUM CHLORIDE 0.9 % (FLUSH) 0.9 %
10 SYRINGE (ML) INJECTION EVERY 12 HOURS SCHEDULED
Status: CANCELLED | OUTPATIENT
Start: 2018-06-18

## 2018-06-13 RX ORDER — SODIUM CHLORIDE, SODIUM LACTATE, POTASSIUM CHLORIDE, CALCIUM CHLORIDE 600; 310; 30; 20 MG/100ML; MG/100ML; MG/100ML; MG/100ML
INJECTION, SOLUTION INTRAVENOUS CONTINUOUS
Status: CANCELLED | OUTPATIENT
Start: 2018-06-18

## 2018-06-13 RX ORDER — SODIUM CHLORIDE 0.9 % (FLUSH) 0.9 %
10 SYRINGE (ML) INJECTION PRN
Status: CANCELLED | OUTPATIENT
Start: 2018-06-18

## 2018-06-13 RX ORDER — LIDOCAINE HYDROCHLORIDE 10 MG/ML
1 INJECTION, SOLUTION EPIDURAL; INFILTRATION; INTRACAUDAL; PERINEURAL
Status: CANCELLED | OUTPATIENT
Start: 2018-06-18 | End: 2018-06-18

## 2018-06-13 RX ADMIN — REGADENOSON 0.4 MG: 0.08 INJECTION, SOLUTION INTRAVENOUS at 10:34

## 2018-06-13 RX ADMIN — TETROFOSMIN 35.1 MILLICURIE: 0.23 INJECTION, POWDER, LYOPHILIZED, FOR SOLUTION INTRAVENOUS at 10:35

## 2018-06-13 RX ADMIN — Medication 10 ML: at 10:34

## 2018-06-13 RX ADMIN — Medication 10 ML: at 08:42

## 2018-06-13 RX ADMIN — TETROFOSMIN 10.2 MILLICURIE: 0.23 INJECTION, POWDER, LYOPHILIZED, FOR SOLUTION INTRAVENOUS at 08:45

## 2018-06-13 RX ADMIN — Medication 10 ML: at 10:35

## 2018-06-15 PROCEDURE — 93018 CV STRESS TEST I&R ONLY: CPT | Performed by: INTERNAL MEDICINE

## 2018-06-18 ENCOUNTER — HOSPITAL ENCOUNTER (INPATIENT)
Age: 63
LOS: 1 days | Discharge: HOME HEALTH | DRG: 470 | End: 2018-06-19
Attending: ORTHOPAEDIC SURGERY | Admitting: ORTHOPAEDIC SURGERY
Payer: COMMERCIAL

## 2018-06-18 ENCOUNTER — APPOINTMENT (OUTPATIENT)
Dept: GENERAL RADIOLOGY | Age: 63
DRG: 470 | End: 2018-06-18
Attending: ORTHOPAEDIC SURGERY
Payer: COMMERCIAL

## 2018-06-18 ENCOUNTER — ANESTHESIA (OUTPATIENT)
Dept: OPERATING ROOM | Age: 63
DRG: 470 | End: 2018-06-18
Payer: COMMERCIAL

## 2018-06-18 ENCOUNTER — ANESTHESIA EVENT (OUTPATIENT)
Dept: OPERATING ROOM | Age: 63
DRG: 470 | End: 2018-06-18
Payer: COMMERCIAL

## 2018-06-18 VITALS — OXYGEN SATURATION: 99 % | SYSTOLIC BLOOD PRESSURE: 106 MMHG | DIASTOLIC BLOOD PRESSURE: 65 MMHG

## 2018-06-18 DIAGNOSIS — M17.12 PRIMARY OSTEOARTHRITIS OF LEFT KNEE: Primary | ICD-10-CM

## 2018-06-18 PROCEDURE — 7100000000 HC PACU RECOVERY - FIRST 15 MIN: Performed by: ORTHOPAEDIC SURGERY

## 2018-06-18 PROCEDURE — 6370000000 HC RX 637 (ALT 250 FOR IP): Performed by: ORTHOPAEDIC SURGERY

## 2018-06-18 PROCEDURE — 73560 X-RAY EXAM OF KNEE 1 OR 2: CPT

## 2018-06-18 PROCEDURE — 2580000003 HC RX 258: Performed by: NURSE PRACTITIONER

## 2018-06-18 PROCEDURE — 2580000003 HC RX 258: Performed by: ORTHOPAEDIC SURGERY

## 2018-06-18 PROCEDURE — 6360000002 HC RX W HCPCS: Performed by: ORTHOPAEDIC SURGERY

## 2018-06-18 PROCEDURE — C1776 JOINT DEVICE (IMPLANTABLE): HCPCS | Performed by: ORTHOPAEDIC SURGERY

## 2018-06-18 PROCEDURE — 7100000001 HC PACU RECOVERY - ADDTL 15 MIN: Performed by: ORTHOPAEDIC SURGERY

## 2018-06-18 PROCEDURE — 3700000000 HC ANESTHESIA ATTENDED CARE: Performed by: ORTHOPAEDIC SURGERY

## 2018-06-18 PROCEDURE — 6360000002 HC RX W HCPCS: Performed by: ANESTHESIOLOGY

## 2018-06-18 PROCEDURE — 1210000000 HC MED SURG R&B

## 2018-06-18 PROCEDURE — 0SRD0J9 REPLACEMENT OF LEFT KNEE JOINT WITH SYNTHETIC SUBSTITUTE, CEMENTED, OPEN APPROACH: ICD-10-PCS | Performed by: ORTHOPAEDIC SURGERY

## 2018-06-18 PROCEDURE — 2500000003 HC RX 250 WO HCPCS: Performed by: NURSE PRACTITIONER

## 2018-06-18 PROCEDURE — 2500000003 HC RX 250 WO HCPCS

## 2018-06-18 PROCEDURE — 6360000002 HC RX W HCPCS: Performed by: NURSE ANESTHETIST, CERTIFIED REGISTERED

## 2018-06-18 PROCEDURE — 3700000001 HC ADD 15 MINUTES (ANESTHESIA): Performed by: ORTHOPAEDIC SURGERY

## 2018-06-18 PROCEDURE — 3600000005 HC SURGERY LEVEL 5 BASE: Performed by: ORTHOPAEDIC SURGERY

## 2018-06-18 PROCEDURE — 6370000000 HC RX 637 (ALT 250 FOR IP): Performed by: NURSE PRACTITIONER

## 2018-06-18 PROCEDURE — 64445 NJX AA&/STRD SCIATIC NRV IMG: CPT | Performed by: ANESTHESIOLOGY

## 2018-06-18 PROCEDURE — 3600000015 HC SURGERY LEVEL 5 ADDTL 15MIN: Performed by: ORTHOPAEDIC SURGERY

## 2018-06-18 DEVICE — COMPONENT PAT DIA35MM THK10MM SUPERIOR/INFERIOR KNEE: Type: IMPLANTABLE DEVICE | Site: KNEE | Status: FUNCTIONAL

## 2018-06-18 DEVICE — IMPLANTABLE DEVICE: Type: IMPLANTABLE DEVICE | Site: KNEE | Status: FUNCTIONAL

## 2018-06-18 DEVICE — BASEPLATE TIB SZ 5 AP49MM ML74MM KNEE TRITANIUM 4 CRUCFRM: Type: IMPLANTABLE DEVICE | Site: KNEE | Status: FUNCTIONAL

## 2018-06-18 DEVICE — COMPONENT TOT KNEE CAPPED ADV STRYKNEEA] STRYKER CORP]: Type: IMPLANTABLE DEVICE | Site: KNEE | Status: FUNCTIONAL

## 2018-06-18 RX ORDER — SODIUM CHLORIDE 0.9 % (FLUSH) 0.9 %
10 SYRINGE (ML) INJECTION EVERY 12 HOURS SCHEDULED
Status: DISCONTINUED | OUTPATIENT
Start: 2018-06-18 | End: 2018-06-20 | Stop reason: HOSPADM

## 2018-06-18 RX ORDER — ASCORBIC ACID 500 MG
1000 TABLET ORAL DAILY
Status: DISCONTINUED | OUTPATIENT
Start: 2018-06-18 | End: 2018-06-20 | Stop reason: HOSPADM

## 2018-06-18 RX ORDER — OXYCODONE HYDROCHLORIDE 5 MG/1
5 TABLET ORAL EVERY 4 HOURS PRN
Status: DISCONTINUED | OUTPATIENT
Start: 2018-06-18 | End: 2018-06-20 | Stop reason: HOSPADM

## 2018-06-18 RX ORDER — OXYCODONE HYDROCHLORIDE AND ACETAMINOPHEN 5; 325 MG/1; MG/1
1 TABLET ORAL EVERY 4 HOURS PRN
Qty: 30 TABLET | Refills: 0 | Status: SHIPPED | OUTPATIENT
Start: 2018-06-18 | End: 2018-06-23

## 2018-06-18 RX ORDER — FENTANYL CITRATE 50 UG/ML
50 INJECTION, SOLUTION INTRAMUSCULAR; INTRAVENOUS EVERY 10 MIN PRN
Status: DISCONTINUED | OUTPATIENT
Start: 2018-06-18 | End: 2018-06-18

## 2018-06-18 RX ORDER — FLUTICASONE PROPIONATE 50 MCG
1 SPRAY, SUSPENSION (ML) NASAL DAILY
Status: DISCONTINUED | OUTPATIENT
Start: 2018-06-18 | End: 2018-06-20 | Stop reason: HOSPADM

## 2018-06-18 RX ORDER — ACETAMINOPHEN 500 MG
1000 TABLET ORAL ONCE
Status: COMPLETED | OUTPATIENT
Start: 2018-06-18 | End: 2018-06-18

## 2018-06-18 RX ORDER — ONDANSETRON 2 MG/ML
4 INJECTION INTRAMUSCULAR; INTRAVENOUS EVERY 6 HOURS PRN
Status: DISCONTINUED | OUTPATIENT
Start: 2018-06-18 | End: 2018-06-20 | Stop reason: HOSPADM

## 2018-06-18 RX ORDER — DOCUSATE SODIUM 100 MG/1
100 CAPSULE, LIQUID FILLED ORAL 2 TIMES DAILY
Status: DISCONTINUED | OUTPATIENT
Start: 2018-06-18 | End: 2018-06-20 | Stop reason: HOSPADM

## 2018-06-18 RX ORDER — MORPHINE SULFATE 4 MG/ML
4 INJECTION, SOLUTION INTRAMUSCULAR; INTRAVENOUS
Status: DISCONTINUED | OUTPATIENT
Start: 2018-06-18 | End: 2018-06-20 | Stop reason: HOSPADM

## 2018-06-18 RX ORDER — MAGNESIUM HYDROXIDE 1200 MG/15ML
LIQUID ORAL CONTINUOUS PRN
Status: COMPLETED | OUTPATIENT
Start: 2018-06-18 | End: 2018-06-18

## 2018-06-18 RX ORDER — SODIUM CHLORIDE 0.9 % (FLUSH) 0.9 %
10 SYRINGE (ML) INJECTION EVERY 12 HOURS SCHEDULED
Status: DISCONTINUED | OUTPATIENT
Start: 2018-06-18 | End: 2018-06-18

## 2018-06-18 RX ORDER — SODIUM CHLORIDE 9 MG/ML
INJECTION, SOLUTION INTRAVENOUS CONTINUOUS
Status: DISCONTINUED | OUTPATIENT
Start: 2018-06-18 | End: 2018-06-20 | Stop reason: HOSPADM

## 2018-06-18 RX ORDER — ACETAMINOPHEN 325 MG/1
650 TABLET ORAL EVERY 6 HOURS
Status: DISCONTINUED | OUTPATIENT
Start: 2018-06-18 | End: 2018-06-20 | Stop reason: HOSPADM

## 2018-06-18 RX ORDER — LIDOCAINE HYDROCHLORIDE 10 MG/ML
1 INJECTION, SOLUTION EPIDURAL; INFILTRATION; INTRACAUDAL; PERINEURAL
Status: COMPLETED | OUTPATIENT
Start: 2018-06-18 | End: 2018-06-18

## 2018-06-18 RX ORDER — DIPHENHYDRAMINE HYDROCHLORIDE 50 MG/ML
12.5 INJECTION INTRAMUSCULAR; INTRAVENOUS
Status: DISCONTINUED | OUTPATIENT
Start: 2018-06-18 | End: 2018-06-18

## 2018-06-18 RX ORDER — PROPOFOL 10 MG/ML
INJECTION, EMULSION INTRAVENOUS CONTINUOUS PRN
Status: DISCONTINUED | OUTPATIENT
Start: 2018-06-18 | End: 2018-06-18 | Stop reason: SDUPTHER

## 2018-06-18 RX ORDER — CELECOXIB 200 MG/1
400 CAPSULE ORAL ONCE
Status: COMPLETED | OUTPATIENT
Start: 2018-06-18 | End: 2018-06-18

## 2018-06-18 RX ORDER — SODIUM CHLORIDE, SODIUM LACTATE, POTASSIUM CHLORIDE, CALCIUM CHLORIDE 600; 310; 30; 20 MG/100ML; MG/100ML; MG/100ML; MG/100ML
INJECTION, SOLUTION INTRAVENOUS CONTINUOUS
Status: DISCONTINUED | OUTPATIENT
Start: 2018-06-18 | End: 2018-06-18

## 2018-06-18 RX ORDER — LIDOCAINE HYDROCHLORIDE 10 MG/ML
INJECTION, SOLUTION EPIDURAL; INFILTRATION; INTRACAUDAL; PERINEURAL
Status: COMPLETED
Start: 2018-06-18 | End: 2018-06-18

## 2018-06-18 RX ORDER — OXYCODONE HCL 10 MG/1
10 TABLET, FILM COATED, EXTENDED RELEASE ORAL ONCE
Status: COMPLETED | OUTPATIENT
Start: 2018-06-18 | End: 2018-06-18

## 2018-06-18 RX ORDER — ASPIRIN 81 MG/1
81 TABLET ORAL 2 TIMES DAILY
Status: DISCONTINUED | OUTPATIENT
Start: 2018-06-19 | End: 2018-06-20 | Stop reason: HOSPADM

## 2018-06-18 RX ORDER — METOCLOPRAMIDE HYDROCHLORIDE 5 MG/ML
10 INJECTION INTRAMUSCULAR; INTRAVENOUS
Status: DISCONTINUED | OUTPATIENT
Start: 2018-06-18 | End: 2018-06-18

## 2018-06-18 RX ORDER — ONDANSETRON 2 MG/ML
4 INJECTION INTRAMUSCULAR; INTRAVENOUS
Status: DISCONTINUED | OUTPATIENT
Start: 2018-06-18 | End: 2018-06-18

## 2018-06-18 RX ORDER — SODIUM CHLORIDE 0.9 % (FLUSH) 0.9 %
10 SYRINGE (ML) INJECTION PRN
Status: DISCONTINUED | OUTPATIENT
Start: 2018-06-18 | End: 2018-06-20 | Stop reason: HOSPADM

## 2018-06-18 RX ORDER — TAMSULOSIN HYDROCHLORIDE 0.4 MG/1
0.4 CAPSULE ORAL DAILY
Status: DISCONTINUED | OUTPATIENT
Start: 2018-06-18 | End: 2018-06-20 | Stop reason: HOSPADM

## 2018-06-18 RX ORDER — MEPERIDINE HYDROCHLORIDE 25 MG/ML
12.5 INJECTION INTRAMUSCULAR; INTRAVENOUS; SUBCUTANEOUS EVERY 5 MIN PRN
Status: DISCONTINUED | OUTPATIENT
Start: 2018-06-18 | End: 2018-06-18

## 2018-06-18 RX ORDER — MULTIVITAMIN WITH FOLIC ACID 400 MCG
1 TABLET ORAL 2 TIMES DAILY
Status: DISCONTINUED | OUTPATIENT
Start: 2018-06-18 | End: 2018-06-20 | Stop reason: HOSPADM

## 2018-06-18 RX ORDER — HYDROCODONE BITARTRATE AND ACETAMINOPHEN 5; 325 MG/1; MG/1
2 TABLET ORAL PRN
Status: DISCONTINUED | OUTPATIENT
Start: 2018-06-18 | End: 2018-06-18

## 2018-06-18 RX ORDER — METOPROLOL SUCCINATE 50 MG/1
100 TABLET, EXTENDED RELEASE ORAL DAILY
Status: DISCONTINUED | OUTPATIENT
Start: 2018-06-18 | End: 2018-06-20 | Stop reason: HOSPADM

## 2018-06-18 RX ORDER — MORPHINE SULFATE 2 MG/ML
2 INJECTION, SOLUTION INTRAMUSCULAR; INTRAVENOUS
Status: DISCONTINUED | OUTPATIENT
Start: 2018-06-18 | End: 2018-06-20 | Stop reason: HOSPADM

## 2018-06-18 RX ORDER — SODIUM CHLORIDE 0.9 % (FLUSH) 0.9 %
10 SYRINGE (ML) INJECTION PRN
Status: DISCONTINUED | OUTPATIENT
Start: 2018-06-18 | End: 2018-06-18

## 2018-06-18 RX ORDER — ROPIVACAINE HYDROCHLORIDE 5 MG/ML
INJECTION, SOLUTION EPIDURAL; INFILTRATION; PERINEURAL PRN
Status: DISCONTINUED | OUTPATIENT
Start: 2018-06-18 | End: 2018-06-18 | Stop reason: SDUPTHER

## 2018-06-18 RX ORDER — SENNA AND DOCUSATE SODIUM 50; 8.6 MG/1; MG/1
1 TABLET, FILM COATED ORAL DAILY
Status: DISCONTINUED | OUTPATIENT
Start: 2018-06-18 | End: 2018-06-20 | Stop reason: HOSPADM

## 2018-06-18 RX ORDER — CELECOXIB 200 MG/1
200 CAPSULE ORAL DAILY
Status: DISCONTINUED | OUTPATIENT
Start: 2018-06-18 | End: 2018-06-20 | Stop reason: HOSPADM

## 2018-06-18 RX ORDER — ONDANSETRON 2 MG/ML
INJECTION INTRAMUSCULAR; INTRAVENOUS PRN
Status: DISCONTINUED | OUTPATIENT
Start: 2018-06-18 | End: 2018-06-18 | Stop reason: SDUPTHER

## 2018-06-18 RX ORDER — ATORVASTATIN CALCIUM 20 MG/1
20 TABLET, FILM COATED ORAL NIGHTLY
Status: DISCONTINUED | OUTPATIENT
Start: 2018-06-18 | End: 2018-06-20 | Stop reason: HOSPADM

## 2018-06-18 RX ORDER — DEXAMETHASONE SODIUM PHOSPHATE 10 MG/ML
INJECTION INTRAMUSCULAR; INTRAVENOUS PRN
Status: DISCONTINUED | OUTPATIENT
Start: 2018-06-18 | End: 2018-06-18 | Stop reason: SDUPTHER

## 2018-06-18 RX ORDER — ASPIRIN 81 MG/1
81 TABLET ORAL 2 TIMES DAILY
Qty: 30 TABLET | Refills: 1 | Status: SHIPPED | OUTPATIENT
Start: 2018-06-18 | End: 2018-12-12

## 2018-06-18 RX ORDER — BISACODYL 10 MG
10 SUPPOSITORY, RECTAL RECTAL DAILY PRN
Status: DISCONTINUED | OUTPATIENT
Start: 2018-06-18 | End: 2018-06-20 | Stop reason: HOSPADM

## 2018-06-18 RX ORDER — PANTOPRAZOLE SODIUM 40 MG/1
40 TABLET, DELAYED RELEASE ORAL
Status: DISCONTINUED | OUTPATIENT
Start: 2018-06-19 | End: 2018-06-20 | Stop reason: HOSPADM

## 2018-06-18 RX ORDER — KETOROLAC TROMETHAMINE 30 MG/ML
30 INJECTION, SOLUTION INTRAMUSCULAR; INTRAVENOUS EVERY 6 HOURS
Status: COMPLETED | OUTPATIENT
Start: 2018-06-18 | End: 2018-06-18

## 2018-06-18 RX ORDER — WOUND DRESSING ADHESIVE - LIQUID
LIQUID MISCELLANEOUS PRN
Status: DISCONTINUED | OUTPATIENT
Start: 2018-06-18 | End: 2018-06-18 | Stop reason: HOSPADM

## 2018-06-18 RX ORDER — VITAMIN B COMPLEX
1 CAPSULE ORAL 2 TIMES DAILY
Status: DISCONTINUED | OUTPATIENT
Start: 2018-06-18 | End: 2018-06-20 | Stop reason: HOSPADM

## 2018-06-18 RX ORDER — MIDAZOLAM HYDROCHLORIDE 1 MG/ML
INJECTION INTRAMUSCULAR; INTRAVENOUS PRN
Status: DISCONTINUED | OUTPATIENT
Start: 2018-06-18 | End: 2018-06-18 | Stop reason: SDUPTHER

## 2018-06-18 RX ORDER — DOCUSATE SODIUM 100 MG/1
100 CAPSULE, LIQUID FILLED ORAL 2 TIMES DAILY
Qty: 50 CAPSULE | Refills: 1 | Status: SHIPPED | OUTPATIENT
Start: 2018-06-18 | End: 2019-01-24

## 2018-06-18 RX ORDER — HYDROCODONE BITARTRATE AND ACETAMINOPHEN 5; 325 MG/1; MG/1
1 TABLET ORAL PRN
Status: DISCONTINUED | OUTPATIENT
Start: 2018-06-18 | End: 2018-06-18

## 2018-06-18 RX ADMIN — ACETAMINOPHEN 650 MG: 325 TABLET ORAL at 21:34

## 2018-06-18 RX ADMIN — Medication 2 G: at 10:30

## 2018-06-18 RX ADMIN — LIDOCAINE HYDROCHLORIDE 1 ML: 10 INJECTION, SOLUTION EPIDURAL; INFILTRATION; INTRACAUDAL; PERINEURAL at 08:11

## 2018-06-18 RX ADMIN — THERA TABS 1 TABLET: TAB at 15:37

## 2018-06-18 RX ADMIN — DEXAMETHASONE SODIUM PHOSPHATE 5 MG: 10 INJECTION INTRAMUSCULAR; INTRAVENOUS at 09:05

## 2018-06-18 RX ADMIN — ATORVASTATIN CALCIUM 20 MG: 20 TABLET, FILM COATED ORAL at 21:35

## 2018-06-18 RX ADMIN — MIDAZOLAM HYDROCHLORIDE 4 MG: 1 INJECTION, SOLUTION INTRAMUSCULAR; INTRAVENOUS at 09:00

## 2018-06-18 RX ADMIN — PROPOFOL 100 MCG/KG/MIN: 10 INJECTION, EMULSION INTRAVENOUS at 10:34

## 2018-06-18 RX ADMIN — ROPIVACAINE HYDROCHLORIDE 15 ML: 5 INJECTION, SOLUTION EPIDURAL; INFILTRATION; PERINEURAL at 09:05

## 2018-06-18 RX ADMIN — FLUTICASONE PROPIONATE 1 SPRAY: 50 SPRAY, METERED NASAL at 15:44

## 2018-06-18 RX ADMIN — DOCUSATE SODIUM 100 MG: 100 CAPSULE, LIQUID FILLED ORAL at 21:34

## 2018-06-18 RX ADMIN — SODIUM CHLORIDE, POTASSIUM CHLORIDE, SODIUM LACTATE AND CALCIUM CHLORIDE 1000 ML: 600; 310; 30; 20 INJECTION, SOLUTION INTRAVENOUS at 08:11

## 2018-06-18 RX ADMIN — Medication 400 MG: at 15:37

## 2018-06-18 RX ADMIN — SODIUM CHLORIDE, POTASSIUM CHLORIDE, SODIUM LACTATE AND CALCIUM CHLORIDE: 600; 310; 30; 20 INJECTION, SOLUTION INTRAVENOUS at 11:05

## 2018-06-18 RX ADMIN — CELECOXIB 400 MG: 200 CAPSULE ORAL at 08:03

## 2018-06-18 RX ADMIN — SENNOSIDES AND DOCUSATE SODIUM 1 TABLET: 8.6; 5 TABLET ORAL at 21:34

## 2018-06-18 RX ADMIN — OXYCODONE HYDROCHLORIDE 10 MG: 10 TABLET, FILM COATED, EXTENDED RELEASE ORAL at 08:02

## 2018-06-18 RX ADMIN — OXYCODONE HYDROCHLORIDE AND ACETAMINOPHEN 1000 MG: 500 TABLET ORAL at 15:44

## 2018-06-18 RX ADMIN — KETOROLAC TROMETHAMINE 30 MG: 30 INJECTION, SOLUTION INTRAMUSCULAR; INTRAVENOUS at 15:36

## 2018-06-18 RX ADMIN — TRANEXAMIC ACID 1000 MG: 100 INJECTION, SOLUTION INTRAVENOUS at 10:50

## 2018-06-18 RX ADMIN — ACETAMINOPHEN 1000 MG: 500 TABLET ORAL at 08:03

## 2018-06-18 RX ADMIN — TAMSULOSIN HYDROCHLORIDE 0.4 MG: 0.4 CAPSULE ORAL at 15:44

## 2018-06-18 RX ADMIN — ACETAMINOPHEN 650 MG: 325 TABLET ORAL at 15:36

## 2018-06-18 RX ADMIN — ONDANSETRON 4 MG: 2 INJECTION INTRAMUSCULAR; INTRAVENOUS at 12:09

## 2018-06-18 RX ADMIN — TRANEXAMIC ACID 1000 MG: 100 INJECTION, SOLUTION INTRAVENOUS at 13:00

## 2018-06-18 RX ADMIN — Medication 1 CAPSULE: at 15:37

## 2018-06-18 RX ADMIN — MORPHINE SULFATE 2 MG: 2 INJECTION, SOLUTION INTRAMUSCULAR; INTRAVENOUS at 21:36

## 2018-06-18 RX ADMIN — KETOROLAC TROMETHAMINE 30 MG: 30 INJECTION, SOLUTION INTRAMUSCULAR; INTRAVENOUS at 21:36

## 2018-06-18 RX ADMIN — SODIUM CHLORIDE: 9 INJECTION, SOLUTION INTRAVENOUS at 15:36

## 2018-06-18 RX ADMIN — Medication 2 G: at 18:18

## 2018-06-18 ASSESSMENT — PULMONARY FUNCTION TESTS
PIF_VALUE: 0
PIF_VALUE: 1
PIF_VALUE: 0
PIF_VALUE: 1
PIF_VALUE: 0
PIF_VALUE: 1
PIF_VALUE: 0

## 2018-06-18 ASSESSMENT — PAIN SCALES - GENERAL
PAINLEVEL_OUTOF10: 0
PAINLEVEL_OUTOF10: 0
PAINLEVEL_OUTOF10: 1
PAINLEVEL_OUTOF10: 4
PAINLEVEL_OUTOF10: 1

## 2018-06-18 ASSESSMENT — PAIN - FUNCTIONAL ASSESSMENT: PAIN_FUNCTIONAL_ASSESSMENT: 0-10

## 2018-06-18 ASSESSMENT — LIFESTYLE VARIABLES: SMOKING_STATUS: 1

## 2018-06-18 ASSESSMENT — PAIN DESCRIPTION - DESCRIPTORS: DESCRIPTORS: ACHING;CONSTANT

## 2018-06-19 LAB
ANION GAP SERPL CALCULATED.3IONS-SCNC: 11 MEQ/L (ref 7–13)
BUN BLDV-MCNC: 20 MG/DL (ref 8–23)
CALCIUM SERPL-MCNC: 8.5 MG/DL (ref 8.6–10.2)
CHLORIDE BLD-SCNC: 106 MEQ/L (ref 98–107)
CO2: 21 MEQ/L (ref 22–29)
CREAT SERPL-MCNC: 0.81 MG/DL (ref 0.7–1.2)
GFR AFRICAN AMERICAN: >60
GFR NON-AFRICAN AMERICAN: >60
GLUCOSE BLD-MCNC: 137 MG/DL (ref 74–109)
HCT VFR BLD CALC: 39 % (ref 42–52)
HEMOGLOBIN: 13 G/DL (ref 14–18)
MCH RBC QN AUTO: 29.7 PG (ref 27–31.3)
MCHC RBC AUTO-ENTMCNC: 33.4 % (ref 33–37)
MCV RBC AUTO: 89 FL (ref 80–100)
PDW BLD-RTO: 14.2 % (ref 11.5–14.5)
PLATELET # BLD: 202 K/UL (ref 130–400)
POTASSIUM REFLEX MAGNESIUM: 4.3 MEQ/L (ref 3.5–5.1)
RBC # BLD: 4.38 M/UL (ref 4.7–6.1)
SODIUM BLD-SCNC: 138 MEQ/L (ref 132–144)
WBC # BLD: 15.7 K/UL (ref 4.8–10.8)

## 2018-06-19 PROCEDURE — G8979 MOBILITY GOAL STATUS: HCPCS

## 2018-06-19 PROCEDURE — 2580000003 HC RX 258: Performed by: ORTHOPAEDIC SURGERY

## 2018-06-19 PROCEDURE — G8978 MOBILITY CURRENT STATUS: HCPCS

## 2018-06-19 PROCEDURE — 97162 PT EVAL MOD COMPLEX 30 MIN: CPT

## 2018-06-19 PROCEDURE — G8988 SELF CARE GOAL STATUS: HCPCS

## 2018-06-19 PROCEDURE — 85027 COMPLETE CBC AUTOMATED: CPT

## 2018-06-19 PROCEDURE — 36415 COLL VENOUS BLD VENIPUNCTURE: CPT

## 2018-06-19 PROCEDURE — 97166 OT EVAL MOD COMPLEX 45 MIN: CPT

## 2018-06-19 PROCEDURE — 1210000000 HC MED SURG R&B

## 2018-06-19 PROCEDURE — 97116 GAIT TRAINING THERAPY: CPT

## 2018-06-19 PROCEDURE — 6370000000 HC RX 637 (ALT 250 FOR IP): Performed by: ORTHOPAEDIC SURGERY

## 2018-06-19 PROCEDURE — 80048 BASIC METABOLIC PNL TOTAL CA: CPT

## 2018-06-19 PROCEDURE — 6360000002 HC RX W HCPCS: Performed by: ORTHOPAEDIC SURGERY

## 2018-06-19 PROCEDURE — 97535 SELF CARE MNGMENT TRAINING: CPT

## 2018-06-19 PROCEDURE — G8987 SELF CARE CURRENT STATUS: HCPCS

## 2018-06-19 RX ADMIN — ACETAMINOPHEN 650 MG: 325 TABLET ORAL at 08:30

## 2018-06-19 RX ADMIN — DOCUSATE SODIUM 100 MG: 100 CAPSULE, LIQUID FILLED ORAL at 08:31

## 2018-06-19 RX ADMIN — OXYCODONE HYDROCHLORIDE 5 MG: 5 TABLET ORAL at 08:42

## 2018-06-19 RX ADMIN — DOCUSATE SODIUM 100 MG: 100 CAPSULE, LIQUID FILLED ORAL at 20:30

## 2018-06-19 RX ADMIN — SENNOSIDES AND DOCUSATE SODIUM 1 TABLET: 8.6; 5 TABLET ORAL at 20:30

## 2018-06-19 RX ADMIN — TAMSULOSIN HYDROCHLORIDE 0.4 MG: 0.4 CAPSULE ORAL at 08:30

## 2018-06-19 RX ADMIN — MORPHINE SULFATE 2 MG: 2 INJECTION, SOLUTION INTRAMUSCULAR; INTRAVENOUS at 20:31

## 2018-06-19 RX ADMIN — OXYCODONE HYDROCHLORIDE 5 MG: 5 TABLET ORAL at 17:39

## 2018-06-19 RX ADMIN — Medication 1 CAPSULE: at 08:31

## 2018-06-19 RX ADMIN — Medication 10 ML: at 20:31

## 2018-06-19 RX ADMIN — THERA TABS 1 TABLET: TAB at 20:31

## 2018-06-19 RX ADMIN — ACETAMINOPHEN 650 MG: 325 TABLET ORAL at 20:30

## 2018-06-19 RX ADMIN — ACETAMINOPHEN 650 MG: 325 TABLET ORAL at 14:00

## 2018-06-19 RX ADMIN — OXYCODONE HYDROCHLORIDE 5 MG: 5 TABLET ORAL at 02:47

## 2018-06-19 RX ADMIN — PANTOPRAZOLE SODIUM 40 MG: 40 TABLET, DELAYED RELEASE ORAL at 05:14

## 2018-06-19 RX ADMIN — THERA TABS 1 TABLET: TAB at 08:31

## 2018-06-19 RX ADMIN — OXYCODONE HYDROCHLORIDE AND ACETAMINOPHEN 1000 MG: 500 TABLET ORAL at 08:31

## 2018-06-19 RX ADMIN — Medication 400 MG: at 08:30

## 2018-06-19 RX ADMIN — ASPIRIN 81 MG: 81 TABLET, COATED ORAL at 20:30

## 2018-06-19 RX ADMIN — ATORVASTATIN CALCIUM 20 MG: 20 TABLET, FILM COATED ORAL at 20:31

## 2018-06-19 RX ADMIN — ASPIRIN 81 MG: 81 TABLET, COATED ORAL at 08:31

## 2018-06-19 RX ADMIN — Medication 2 G: at 02:39

## 2018-06-19 RX ADMIN — ACETAMINOPHEN 650 MG: 325 TABLET ORAL at 02:39

## 2018-06-19 RX ADMIN — METOPROLOL SUCCINATE 100 MG: 50 TABLET, FILM COATED, EXTENDED RELEASE ORAL at 08:30

## 2018-06-19 RX ADMIN — CELECOXIB 200 MG: 200 CAPSULE ORAL at 08:30

## 2018-06-19 ASSESSMENT — PAIN DESCRIPTION - LOCATION: LOCATION: KNEE

## 2018-06-19 ASSESSMENT — PAIN DESCRIPTION - DESCRIPTORS: DESCRIPTORS: SORE;ACHING

## 2018-06-19 ASSESSMENT — PAIN SCALES - GENERAL
PAINLEVEL_OUTOF10: 6
PAINLEVEL_OUTOF10: 3
PAINLEVEL_OUTOF10: 6
PAINLEVEL_OUTOF10: 3
PAINLEVEL_OUTOF10: 7

## 2018-06-19 ASSESSMENT — PAIN DESCRIPTION - PAIN TYPE: TYPE: ACUTE PAIN;SURGICAL PAIN

## 2018-06-19 ASSESSMENT — PAIN DESCRIPTION - ORIENTATION: ORIENTATION: LEFT

## 2018-06-19 ASSESSMENT — PAIN DESCRIPTION - FREQUENCY: FREQUENCY: INTERMITTENT

## 2018-06-20 VITALS
WEIGHT: 197.56 LBS | HEIGHT: 69 IN | SYSTOLIC BLOOD PRESSURE: 152 MMHG | TEMPERATURE: 97.9 F | DIASTOLIC BLOOD PRESSURE: 83 MMHG | HEART RATE: 79 BPM | OXYGEN SATURATION: 100 % | RESPIRATION RATE: 16 BRPM | BODY MASS INDEX: 29.26 KG/M2

## 2018-06-20 LAB
HCT VFR BLD CALC: 38.6 % (ref 42–52)
HEMOGLOBIN: 12.9 G/DL (ref 14–18)
MCH RBC QN AUTO: 30 PG (ref 27–31.3)
MCHC RBC AUTO-ENTMCNC: 33.5 % (ref 33–37)
MCV RBC AUTO: 89.7 FL (ref 80–100)
PDW BLD-RTO: 14.1 % (ref 11.5–14.5)
PLATELET # BLD: 191 K/UL (ref 130–400)
RBC # BLD: 4.3 M/UL (ref 4.7–6.1)
WBC # BLD: 11.4 K/UL (ref 4.8–10.8)

## 2018-06-20 PROCEDURE — 2580000003 HC RX 258: Performed by: ORTHOPAEDIC SURGERY

## 2018-06-20 PROCEDURE — 6370000000 HC RX 637 (ALT 250 FOR IP): Performed by: ORTHOPAEDIC SURGERY

## 2018-06-20 PROCEDURE — 97116 GAIT TRAINING THERAPY: CPT

## 2018-06-20 PROCEDURE — 85027 COMPLETE CBC AUTOMATED: CPT

## 2018-06-20 PROCEDURE — 97535 SELF CARE MNGMENT TRAINING: CPT

## 2018-06-20 PROCEDURE — 36415 COLL VENOUS BLD VENIPUNCTURE: CPT

## 2018-06-20 RX ADMIN — ACETAMINOPHEN 650 MG: 325 TABLET ORAL at 03:19

## 2018-06-20 RX ADMIN — PANTOPRAZOLE SODIUM 40 MG: 40 TABLET, DELAYED RELEASE ORAL at 06:11

## 2018-06-20 RX ADMIN — FLUTICASONE PROPIONATE 1 SPRAY: 50 SPRAY, METERED NASAL at 09:12

## 2018-06-20 RX ADMIN — Medication 1 CAPSULE: at 09:08

## 2018-06-20 RX ADMIN — OXYCODONE HYDROCHLORIDE 5 MG: 5 TABLET ORAL at 16:50

## 2018-06-20 RX ADMIN — ACETAMINOPHEN 650 MG: 325 TABLET ORAL at 09:08

## 2018-06-20 RX ADMIN — OXYCODONE HYDROCHLORIDE 5 MG: 5 TABLET ORAL at 00:03

## 2018-06-20 RX ADMIN — Medication 10 ML: at 09:09

## 2018-06-20 RX ADMIN — METOPROLOL SUCCINATE 100 MG: 50 TABLET, FILM COATED, EXTENDED RELEASE ORAL at 09:08

## 2018-06-20 RX ADMIN — OXYCODONE HYDROCHLORIDE 5 MG: 5 TABLET ORAL at 10:23

## 2018-06-20 RX ADMIN — ACETAMINOPHEN 650 MG: 325 TABLET ORAL at 16:50

## 2018-06-20 RX ADMIN — CELECOXIB 200 MG: 200 CAPSULE ORAL at 09:08

## 2018-06-20 RX ADMIN — DOCUSATE SODIUM 100 MG: 100 CAPSULE, LIQUID FILLED ORAL at 09:08

## 2018-06-20 RX ADMIN — ASPIRIN 81 MG: 81 TABLET, COATED ORAL at 09:08

## 2018-06-20 RX ADMIN — OXYCODONE HYDROCHLORIDE AND ACETAMINOPHEN 1000 MG: 500 TABLET ORAL at 09:08

## 2018-06-20 RX ADMIN — OXYCODONE HYDROCHLORIDE 5 MG: 5 TABLET ORAL at 06:11

## 2018-06-20 RX ADMIN — THERA TABS 1 TABLET: TAB at 09:08

## 2018-06-20 ASSESSMENT — PAIN SCALES - GENERAL
PAINLEVEL_OUTOF10: 6
PAINLEVEL_OUTOF10: 4
PAINLEVEL_OUTOF10: 6
PAINLEVEL_OUTOF10: 5
PAINLEVEL_OUTOF10: 5
PAINLEVEL_OUTOF10: 2
PAINLEVEL_OUTOF10: 5
PAINLEVEL_OUTOF10: 5

## 2018-06-20 ASSESSMENT — PAIN DESCRIPTION - PAIN TYPE
TYPE: SURGICAL PAIN
TYPE: ACUTE PAIN;SURGICAL PAIN
TYPE: ACUTE PAIN;SURGICAL PAIN

## 2018-06-20 ASSESSMENT — PAIN DESCRIPTION - LOCATION
LOCATION: KNEE

## 2018-06-20 ASSESSMENT — PAIN DESCRIPTION - ORIENTATION
ORIENTATION: LEFT

## 2018-06-20 ASSESSMENT — PAIN DESCRIPTION - DESCRIPTORS
DESCRIPTORS: ACHING
DESCRIPTORS: ACHING;SORE

## 2018-07-06 ENCOUNTER — HOSPITAL ENCOUNTER (OUTPATIENT)
Dept: PHYSICAL THERAPY | Age: 63
Setting detail: THERAPIES SERIES
Discharge: HOME | End: 2018-07-06
Payer: COMMERCIAL

## 2018-07-06 PROCEDURE — 97161 PT EVAL LOW COMPLEX 20 MIN: CPT

## 2018-07-06 ASSESSMENT — PAIN SCALES - GENERAL: PAINLEVEL_OUTOF10: 2

## 2018-07-06 ASSESSMENT — PAIN DESCRIPTION - PROGRESSION: CLINICAL_PROGRESSION: GRADUALLY IMPROVING

## 2018-07-06 ASSESSMENT — PAIN DESCRIPTION - LOCATION: LOCATION: KNEE

## 2018-07-06 ASSESSMENT — PAIN DESCRIPTION - ORIENTATION: ORIENTATION: LEFT

## 2018-07-06 ASSESSMENT — PAIN DESCRIPTION - PAIN TYPE: TYPE: SURGICAL PAIN

## 2018-07-06 ASSESSMENT — PAIN DESCRIPTION - FREQUENCY: FREQUENCY: INTERMITTENT

## 2018-07-06 ASSESSMENT — PAIN DESCRIPTION - DESCRIPTORS: DESCRIPTORS: TIGHTNESS;SORE

## 2018-07-06 NOTE — PROGRESS NOTES
Responsibilities: Yes  Ambulation Assistance: Independent  Transfer Assistance: Independent  Active : Yes  Occupation: Full time employment  Type of occupation:   Leisure & Hobbies: umpiring   Additional Comments: current functional level 50%      Subjective:  Subjective: Pt presents s/p L TKR 6/18/18 stayed in the hospital for a few days then went home with Home health PT. Currently ambulating with SC at all times. Objective:   Ambulation 1  Surface: carpet  Device: No Device, Single point cane  Assistance: Independent, Modified Independent  Quality of Gait: L toe out, decreased L knee rom, decreased trunk rotation, increased R Le wbing   Distance: within dept  Comments: no evidence of instability with or without AD   Stairs  # Steps : 8  Stairs Height: 6\"  Rails: Right ascending  Device: No Device  Assistance: Independent  Comment: pt able to perform non-recip and recip without evidence of instability     Transfers  Sit to Stand: Independent  Stand to sit: Independent  Bed to Chair: Independent    Strength RLE  Comment: 4/5 hip flex, 5/5 knee, ankle df, Hip IR, ER, 4+/5 hip abd 4-/5 hip ext    Strength LLE  Comment: 4/5 hip flex, knee,hip abd 4+/5 ankle df, hip IR, ER 4-/5 hip ext      AROM RLE (degrees)  RLE General AROM: knee 0-115; ankle df 10 with KE     AROM LLE (degrees)  LLE General AROM: knee-6-105; ankle df to neutral with KE     Observation/Palpation  Observation: ecchymosis L posterior knee/HS  Edema: mid patella circumfrential measurements R 39.4cm L 43.5cm   Scar: B anterior knees, L intact and healing with minimal scabbing on incision  Bed mobility  Rolling to Left: Independent  Rolling to Right: Independent  Supine to Sit: Independent  Sit to Supine: Independent     Transfers  Sit to Stand: Independent  Stand to sit:  Independent  Bed to Chair: Independent  Ambulation 1  Surface: carpet  Device: No Device, Single point cane  Assistance: Independent, Modified Independent  Quality of Gait: L toe out, decreased L knee rom, decreased trunk rotation, increased R Le wbing   Distance: within dept  Comments: no evidence of instability with or without AD      Ambulation  Ambulation?: Yes  Stairs  # Steps : 8  Stairs Height: 6\"  Rails: Right ascending  Device: No Device  Assistance: Independent  Comment: pt able to perform non-recip and recip without evidence of instability     Additional Measures  Flexibility: decreased L gastroc flexibility (see above measurements)   Special Tests: NA d/t recent surgery      Exercises:   Exercises  Exercise 1: Bike*  Exercise 2: 3 way SLR*  Exercise 3: S/L hip series*  Exercise 4: step ups F/L*  Exercise 5: step downs*  Exercise 6: rockerboard*  Exercise 7: knee flexion stretch on step*  Exercise 8: HS stretch*  Exercise 9: gastroc/soleues stretch*  Exercise 10: BOSU lunges*  Exercise 11: TG squats, HR*   Exercise 12: 4 way hip*  Exercise 20: HEP: cont current from [address] [address]   Modalities:  Modalities  Other: Int cold/compression L knee*  Manual:  Manual therapy  PROM: L knee*  Soft Tissue Mobalization: L knee*  *Indicates exercise,modality, or manual techniques to be initiated when appropriate  Assessment: Body structures, Functions, Activity limitations: Decreased functional mobility , Decreased ROM, Decreased strength  Assessment: Pt presents s/p L TKR 6/18/18 with decreased L knee arom, decreased B LE L>R strength, decreased L gastroc flexibility, p/o pain and edema. These impairments currently limit his functional abilities to ambulate, perform stairs, squat, run, and perform functional ADL's at [facility].    Specific instructions for Next Treatment: LEFS NV; Provided written hep NV  Prognosis: Excellent  Discharge Recommendations: Continue to assess pending progress  Activity Tolerance: Patient Tolerated treatment well     Decision Making: Low Complexity  History: high  Exam: high  Clinical Presentation: low       Plan  Frequency/Duration:  Plan  Times per week: 2  Plan

## 2018-07-16 ENCOUNTER — HOSPITAL ENCOUNTER (OUTPATIENT)
Dept: PHYSICAL THERAPY | Age: 63
Setting detail: THERAPIES SERIES
Discharge: HOME | End: 2018-07-16
Payer: COMMERCIAL

## 2018-07-16 PROCEDURE — 97140 MANUAL THERAPY 1/> REGIONS: CPT

## 2018-07-16 PROCEDURE — 97110 THERAPEUTIC EXERCISES: CPT

## 2018-07-16 PROCEDURE — 97016 VASOPNEUMATIC DEVICE THERAPY: CPT

## 2018-07-16 NOTE — PROGRESS NOTES
[address] [address]  Outpatient Physical Therapy    Treatment Note        Date: 2018  Patient: Blayne Borrego  : 1955  ACCT #: [de-identified]  Referring Practitioner: CECILY Wolf  Diagnosis: primary OA of L knee    Visit Information:  PT Visit Information  PT Insurance Information: BCBS  Total # of Visits Approved: 13 (Eval +12 V)  Total # of Visits to Date: 2  Plan of Care/Certification Expiration Date: 18 (18-18)  No Show: 0  Canceled Appointment: 0  Progress Note Counter:      Subjective: Pt reports plans to RTW part time 18; feeling tightness in quad this date. Presents ambulating without AD and reports only using at night with increased fatigue. Comments: RTD 18   HEP Compliance:  [x] Good [] Fair [] Poor [] Reports not doing due to:    Vital Signs  Patient Currently in Pain: Denies   Pain Screening  Patient Currently in Pain: Denies    OBJECTIVE:   Exercises  Exercise 1: Bike x 5 min   Exercise 2: 3 way SLR x15  Exercise 3: S/L hip series*  Exercise 4: step ups F/L 6\"x15   Exercise 5: step downs*  Exercise 6: rockerboard 4 way x20 small   Exercise 7: knee flexion stretch on step 20\"x3   Exercise 8: HS stretch 30\"x3 long sitting   Exercise 9: gastroc/soleues stretch 30\"x3 with towel   Exercise 10: BOSU lunges*  Exercise 11: TG squats, HR*   Exercise 12: 4 way hip*  Exercise 13: prone quad stretch with strap 30\"x3  Exercise 20: HEP: HS stretch, gastroc/soleus stretch, 3 way SLR      Strength: [x] NT  [] MMT completed:    ROM: [] NT  [x] ROM measurements:  AROM LLE (degrees)  LLE General AROM: knee flexion 115     Manual:   Manual therapy  PROM: L knee flex/ext in supine x 8 min  Soft Tissue Mobalization: L knee*    Modalities:  Modalities  Other: Int cold/compression L knee x10 min mid patella circumf lo pre=41.0cm post= 39.8cm   *Indicates exercise, modality, or manual techniques to be initiated when appropriate    Assessment:    Body structures, Functions, Activity limitations: Decreased functional mobility , Decreased ROM, Decreased strength  Assessment: Pt with good bree to initiation of tx this date without significant c/o increased pain, fatigue only. Significant improvement in L knee flexion arom vs. evaluation this date. Treatment Diagnosis: decreased L knee arom, decreased B LE L>R strength, decreased L gastroc flexibility, p/o pain and edema  Prognosis: Excellent  Patient Education: Provided written handout of hep     Goals:  Long term goals  Time Frame for Long term goals : 6  Long term goal 1: Pt will be indep/compliant with hep in order to self manage symptoms upon D/C  Long term goal 2: The pt will have an increase in LEFS score >/=15 points demonstrating an improvement in functional activity tolerace  Long term goal 3: The pt will demo improved B LE strength >/= 4+ to 5/5 in order to safely ambulate without AD at PLOF  Long term goal 4: The pt will demo improved L knee AROM >/=0-115* in order to perform full flight of stairs reciprocal indep without HR's  Long term goal 5: The patient will ambulate unlimited distances all surfaces independently without AD indep in order to safely ambulate in the community   Progress toward goals: to be determined    POST-PAIN       Pain Rating (0-10 pain scale):   0/10   Location and pain description same as pre-treatment unless indicated.    Action: [x] NA   [] Perform HEP  [] Meds as prescribed  [] Modalities as prescribed   [] Call Physician     Frequency/Duration:  Plan  Times per week: 2  Plan weeks: 6 (pending insurance )  Current Treatment Recommendations: Strengthening, ROM, Home Exercise Program, Neuromuscular Re-education, Balance Training, Manual Therapy - Soft Tissue Mobilization, Manual Therapy - Joint Manipulation, Patient/Caregiver Education & Training, Equipment Evaluation, Education, & procurement, Modalities, Gait Training, Stair training, Transfer Training, Safety Education & Training     Pt to continue current HEP. See objective section for any therapeutic exercise changes, additions or modifications this date.     PT Individual Minutes  Time In: 7084  Time Out: 1867  Minutes: 48  Timed Code Treatment Minutes: 38 Minutes  Procedure Minutes: 10    Signature:  Electronically signed by Jordyn Estrada PT on 7/16/18 at 9:24 AM

## 2018-07-17 ENCOUNTER — HOSPITAL ENCOUNTER (OUTPATIENT)
Dept: PHYSICAL THERAPY | Age: 63
Setting detail: THERAPIES SERIES
Discharge: HOME | End: 2018-07-17
Payer: COMMERCIAL

## 2018-07-17 PROCEDURE — 97110 THERAPEUTIC EXERCISES: CPT

## 2018-07-17 PROCEDURE — 97016 VASOPNEUMATIC DEVICE THERAPY: CPT

## 2018-07-17 NOTE — PROGRESS NOTES
[address] [address]  Outpatient Physical Therapy    Treatment Note        Date: 2018  Patient: Mikey Frias  : 1955  ACCT #: [de-identified]  Referring Practitioner: CECILY Tenorio  Diagnosis: primary OA of L knee    Visit Information:  PT Visit Information  PT Insurance Information: BCBS  Total # of Visits Approved: 13 (Eval +12 V)  Total # of Visits to Date: 3  Plan of Care/Certification Expiration Date: 18 (18-18)  No Show: 0  Canceled Appointment: 0  Progress Note Counter: 3/13    Subjective: Pt reports sore after last appt. Comments: RTD 18   HEP Compliance:  [x] Good [] Fair [] Poor [] Reports not doing due to:    Vital Signs  Patient Currently in Pain: Denies   Pain Screening  Patient Currently in Pain: Denies    OBJECTIVE:   Exercises  Exercise 1: Bike seat L3 x 5 min   Exercise 2: 3 way SLR x15  Exercise 3: S/L hip series x10 ea  Exercise 4: step ups F/L 6\"x15   Exercise 6: rockerboard 4 way x20 large  Exercise 7: knee flexion stretch on step 20\"x3   Exercise 8: HS stretch 30\"x3 at step  Exercise 9: gastroc stretch 30\"x3 at step  Exercise 10: BOSU lunges F/L x15 b/l  Exercise 13: prone quad stretch with strap 30\"x3  Exercise 20: HEP: Hip series    ROM: [] NT  [x] ROM measurements:  AROM LLE (degrees)  LLE General AROM: L knee 2-117 deg     Modalities:  Modalities  Other: Int cold/compression L knee x10 min mid patella circumf lo pre=40.5 cm post= 40.0 cm     *Indicates exercise, modality, or manual techniques to be initiated when appropriate    Assessment: Body structures, Functions, Activity limitations: Decreased functional mobility , Decreased ROM, Decreased strength  Assessment: Pt reports some soreness in L patellar tendon area. States soreness with tx this date mostly residual from yesterday's tx. Good tolerance to additon of BOSU lunges and hip series.   Treatment Diagnosis: decreased L knee arom, decreased B LE L>R strength, decreased L gastroc Signature:  Electronically signed by Perlita Lind, PTA on 7/17/18 at 9:23 AM

## 2018-07-20 ENCOUNTER — HOSPITAL ENCOUNTER (OUTPATIENT)
Dept: PHYSICAL THERAPY | Age: 63
Setting detail: THERAPIES SERIES
Discharge: HOME | End: 2018-07-20
Payer: COMMERCIAL

## 2018-07-20 PROCEDURE — 97140 MANUAL THERAPY 1/> REGIONS: CPT

## 2018-07-20 PROCEDURE — 97110 THERAPEUTIC EXERCISES: CPT

## 2018-07-23 ENCOUNTER — HOSPITAL ENCOUNTER (OUTPATIENT)
Dept: PHYSICAL THERAPY | Age: 63
Setting detail: THERAPIES SERIES
Discharge: HOME | End: 2018-07-23
Payer: COMMERCIAL

## 2018-07-23 PROCEDURE — 97110 THERAPEUTIC EXERCISES: CPT

## 2018-07-23 PROCEDURE — 97016 VASOPNEUMATIC DEVICE THERAPY: CPT

## 2018-07-23 ASSESSMENT — PAIN DESCRIPTION - LOCATION: LOCATION: KNEE

## 2018-07-23 ASSESSMENT — PAIN DESCRIPTION - DESCRIPTORS: DESCRIPTORS: TIGHTNESS;SORE

## 2018-07-23 ASSESSMENT — PAIN DESCRIPTION - PAIN TYPE: TYPE: SURGICAL PAIN

## 2018-07-23 ASSESSMENT — PAIN SCALES - GENERAL: PAINLEVEL_OUTOF10: 1

## 2018-07-23 ASSESSMENT — PAIN DESCRIPTION - ORIENTATION: ORIENTATION: LEFT

## 2018-07-24 RX ORDER — CELECOXIB 200 MG/1
CAPSULE ORAL
Qty: 180 CAPSULE | Refills: 0 | Status: SHIPPED | OUTPATIENT
Start: 2018-07-24 | End: 2018-10-28 | Stop reason: SDUPTHER

## 2018-07-25 ENCOUNTER — HOSPITAL ENCOUNTER (OUTPATIENT)
Dept: PHYSICAL THERAPY | Age: 63
Setting detail: THERAPIES SERIES
Discharge: HOME | End: 2018-07-25
Payer: COMMERCIAL

## 2018-07-25 PROCEDURE — 97110 THERAPEUTIC EXERCISES: CPT

## 2018-07-27 ENCOUNTER — HOSPITAL ENCOUNTER (OUTPATIENT)
Dept: PHYSICAL THERAPY | Age: 63
Setting detail: THERAPIES SERIES
Discharge: HOME | End: 2018-07-27
Payer: COMMERCIAL

## 2018-07-27 PROCEDURE — 97110 THERAPEUTIC EXERCISES: CPT

## 2018-07-27 PROCEDURE — 97140 MANUAL THERAPY 1/> REGIONS: CPT

## 2018-07-27 PROCEDURE — 97016 VASOPNEUMATIC DEVICE THERAPY: CPT

## 2018-07-27 NOTE — PROGRESS NOTES
[address] [address]  Outpatient Physical Therapy    Treatment Note        Date: 2018  Patient: Cass West  : 1955  ACCT #: [de-identified]  Referring Practitioner: CECILY Lopez  Diagnosis: primary OA of L knee    Visit Information:  PT Visit Information  PT Insurance Information: BCBS  Total # of Visits Approved: 13  Total # of Visits to Date: 7  Plan of Care/Certification Expiration Date: 18  No Show: 0  Canceled Appointment: 0  Progress Note Counter:     Subjective: Pt denies any pain today. Pt states \"it just pulls a little going up an down the stairs. \"  Comments: RTD 18   HEP Compliance:  [x] Good [] Fair [] Poor [] Reports not doing due to:    Vital Signs  Patient Currently in Pain: Denies   Pain Screening  Patient Currently in Pain: Denies    OBJECTIVE:   Exercises  Exercise 1: Bike seat L2 x 2.5 min fwd, L 2.5' retro  Exercise 2: 3 way SLR x 15 b/l #1  Exercise 3: S/L hip series x 15 b/l #1  Exercise 4: step ups F/L 8\"x 20  Exercise 6: rockerboard 4 way x20 Lrg  Exercise 7: knee flexion stretch on step 20\"x3   Exercise 8: HS stretch 30 sec x 3, b/l at step  Exercise 10: BOSU lunges F/L x15 b/l  Exercise 13: prone quad stretch with strap 30\"x3         Strength: [x] NT  [] MMT completed:      ROM: [] NT  [x] ROM measurements:      AROM LLE (degrees)  LLE General AROM: L knee 2-122 deg      Manual:   Manual therapy  PROM: L knee flex/ext in supine x 5 min    Modalities:  Modalities  Other: Int cold/compression L knee x10 min mid patella circumf lo pre=42.2 cm post= 41.7 cm     *Indicates exercise, modality, or manual techniques to be initiated when appropriate    Assessment: Body structures, Functions, Activity limitations: Decreased functional mobility , Decreased ROM, Decreased strength  Assessment: Increased resistance ps with hip series and SLR. AROM slightly improved. Good tolerance to progressions and treatment.   Treatment Diagnosis: decreased L knee arom,

## 2018-07-30 ENCOUNTER — HOSPITAL ENCOUNTER (OUTPATIENT)
Dept: PHYSICAL THERAPY | Age: 63
Setting detail: THERAPIES SERIES
Discharge: HOME | End: 2018-07-30
Payer: COMMERCIAL

## 2018-07-30 PROCEDURE — 97110 THERAPEUTIC EXERCISES: CPT

## 2018-07-30 ASSESSMENT — PAIN SCALES - GENERAL: PAINLEVEL_OUTOF10: 0

## 2018-07-30 NOTE — PROGRESS NOTES
Training, Manual Therapy - Soft Tissue Mobilization, Manual Therapy - Joint Manipulation, Patient/Caregiver Education & Training, Equipment Evaluation, Education, & procurement, Modalities, Gait Training, Stair training, Transfer Training, Safety Education & Training  Plan Comment: PN completed for RTD 8/2. Pending MD appt, pt would like to DC at end of POC. Pt to continue current HEP. See objective section for any therapeutic exercise changes, additions or modifications this date.          PT Individual Minutes  Time In: 8689  Time Out: 6057  Minutes: 38  Timed Code Treatment Minutes: 38 Minutes  Procedure Minutes: 0    Activity Minutes Units   Ther Ex 38 3   Manual      Neuro Ed                Signature:  Electronically signed by Perlita Lind PTA on 7/30/18 at 2:35 PM

## 2018-07-30 NOTE — PROGRESS NOTES
[address]     Ph: [phone]  Fax: [phone]    [] Certification  [] Recertification []  Plan of Care  [x] Progress Note [] Discharge      To:  Referring Practitioner: CECILY San      From: Wei Carvajal, PT, DPT  Patient: Dana Coulter     : 1955  Diagnosis: primary OA of L knee     Date: 2018  Treatment Diagnosis: decreased L knee arom, decreased B LE L>R strength, decreased L gastroc flexibility, p/o pain and edema    Plan of Care/Certification Expiration Date: 18  Progress Report Period from:  2018  to 2018    Total # of Visits to Date: 8   No Show: 0    Canceled Appointment: 0     OBJECTIVE:   Long Term Goals - Time Frame for Long term goals : 6  Goals Current/ Discharge status Met   Long term goal 1: Pt will be indep/compliant with hep in order to self manage symptoms upon D/C Pt reports compliance with ongoing HEP [x] yes  [x] no   Long term goal 2: The pt will have an increase in LEFS score >/=15 points demonstrating an improvement in functional activity tolerace LEFS 63/80 = 78.75% functional [x] yes  [] no   Long term goal 3: The pt will demo improved B LE strength >/= 4+ to 5/5 in order to safely ambulate without AD at [facility] Strength RLE  Comment: hip flex 4+/5, hip abd 4/5, hip ext 4+/5, knee 4+/5, DF 5/5  Strength LLE  Comment: hip flx 4/5, abd 4/5, ext 4+/5, quad 4+/5, HS 4/5, DF 5/5   [x] yes  [x] no   Long term goal 4: The pt will demo improved L knee AROM >/=0-115* in order to perform full flight of stairs reciprocal indep without HR's AROM LLE (degrees)  LLE General AROM: L knee 2-122 deg     Stairs  # Steps : 4  Stairs Height: 6\"  Rails: None  Device: No Device  Assistance: Independent  Comment: pt able to perform non-recip without evidence of instability  [x] yes  [x] no   Long term goal 5:  The patient will ambulate unlimited distances all surfaces services.     Physician Signature:__________________________________________________________  Date:  Please sign and return

## 2018-08-01 ENCOUNTER — HOSPITAL ENCOUNTER (OUTPATIENT)
Dept: PHYSICAL THERAPY | Age: 63
Setting detail: THERAPIES SERIES
Discharge: HOME | End: 2018-08-01
Payer: COMMERCIAL

## 2018-08-01 PROCEDURE — 97110 THERAPEUTIC EXERCISES: CPT

## 2018-08-01 NOTE — PROGRESS NOTES
[address] [address]  Outpatient Physical Therapy    Treatment Note        Date: 2018  Patient: Iveth Gallagher  : 1955  ACCT #: [de-identified]  Referring Practitioner: CECILY Arguelles  Diagnosis: primary OA of L knee    Visit Information:  PT Visit Information  PT Insurance Information: BCBS  Total # of Visits Approved: 13  Total # of Visits to Date: 5  Plan of Care/Certification Expiration Date: 18  No Show: 0  Canceled Appointment: 0  Progress Note Counter:     Subjective: Pt reports still has didfficuty with descending stairs and squating. Comments: RTD 18   HEP Compliance:  [x] Good [] Fair [] Poor [] Reports not doing due to:    Vital Signs  Patient Currently in Pain: Denies   Pain Screening  Patient Currently in Pain: Denies    OBJECTIVE:   Exercises  Exercise 1: Bike seat L2 x 2.5 min fwd, L 2.5' retro  Exercise 2: 3 way SLR x 20 b/l #1  Exercise 3: S/L hip series x 20 b/l #1  Exercise 6: rockerboard 4 way x20 Lrg  Exercise 7: knee flexion stretch on step 20\"x3   Exercise 8: HS stretch 30 sec x 3, b/l at step  Exercise 10: BOSU lunges F/L x20 b/l  Exercise 12: 4 way hip RTB focus on SLS x20 b/l w/ fingertip support of 1 UE  Exercise 13: prone quad stretch with strap 30\"x3       Strength: [x] NT  [] MMT completed:     ROM: [x] NT  [] ROM measurements:      Modalities:        *Indicates exercise, modality, or manual techniques to be initiated when appropriate    Assessment: Body structures, Functions, Activity limitations: Decreased functional mobility , Decreased ROM, Decreased strength  Assessment: Pt continues to complain about tightness posterior left knee. In craesed multiple exercises to improve LE strength and stability. Good tolerance to progressions and treatment. Prognosis: Excellent  Patient Education: Continue with home exercises.     Goals:       Long term goals  Time Frame for Long term goals : 6  Long term goal 1: Pt will be indep/compliant with hep in order to self manage symptoms upon D/C  Long term goal 2: The pt will have an increase in LEFS score >/=15 points demonstrating an improvement in functional activity tolerace  Long term goal 3: The pt will demo improved B LE strength >/= 4+ to 5/5 in order to safely ambulate without AD at PLOF  Long term goal 4: The pt will demo improved L knee AROM >/=0-115* in order to perform full flight of stairs reciprocal indep without HR's  Long term goal 5: The patient will ambulate unlimited distances all surfaces independently without AD indep in order to safely ambulate in the community   Progress toward goals:Progressing towards All goals. POST-PAIN       Pain Rating (0-10 pain scale):  0 /10   Location and pain description same as pre-treatment unless indicated. Action: [] NA   [x] Perform HEP  [] Meds as prescribed  [] Modalities as prescribed   [] Call Physician     Frequency/Duration:  Plan  Times per week: 2  Plan weeks: 6  Current Treatment Recommendations: Strengthening, ROM, Home Exercise Program, Neuromuscular Re-education, Balance Training, Manual Therapy - Soft Tissue Mobilization, Manual Therapy - Joint Manipulation, Patient/Caregiver Education & Training, Equipment Evaluation, Education, & procurement, Modalities, Gait Training, Stair training, Transfer Training, Safety Education & Training     Pt to continue current HEP. See objective section for any therapeutic exercise changes, additions or modifications this date.          PT Individual Minutes  Time In: 8587  Time Out: 8849  Minutes: 38  Timed Code Treatment Minutes: 38 Minutes  Procedure Minutes: NA    Signature:  Electronically signed by Alicia Couch PTA on 8/1/18 at 2:40 PM

## 2018-08-03 ENCOUNTER — HOSPITAL ENCOUNTER (OUTPATIENT)
Dept: PHYSICAL THERAPY | Age: 63
Setting detail: THERAPIES SERIES
Discharge: HOME | End: 2018-08-03
Payer: COMMERCIAL

## 2018-08-03 PROCEDURE — 97110 THERAPEUTIC EXERCISES: CPT

## 2018-08-03 NOTE — PROGRESS NOTES
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 LE strength and ROM WFL with pt able to manage on own with HEP. Treatment Diagnosis: decreased L knee arom, decreased B LE L>R strength, decreased L gastroc flexibility, p/o pain and edema  Prognosis: Excellent     Goals:  Long term goals  Time Frame for Long term goals : 6  Long term goal 1: Pt will be indep/compliant with hep in order to self manage symptoms upon D/C  Long term goal 2: The pt will have an increase in LEFS score >/=15 points demonstrating an improvement in functional activity tolerace  Long term goal 3: The pt will demo improved B LE strength >/= 4+ to 5/5 in order to safely ambulate without AD at PLOF  Long term goal 4: The pt will demo improved L knee AROM >/=0-115* in order to perform full flight of stairs reciprocal indep without HR's  Long term goal 5: The patient will ambulate unlimited distances all surfaces independently without AD indep in order to safely ambulate in the community   Progress toward goals: Therex to improve LE strength and ROM for improved tolerance to functional activities    POST-PAIN       Pain Rating (0-10 pain scale):  0 /10   Location and pain description same as pre-treatment unless indicated. Action: [] NA   [x] Perform HEP  [] Meds as prescribed  [] Modalities as prescribed   [] Call Physician     Frequency/Duration:  Plan  Plan Comment: Discharge     Pt to continue current HEP. See objective section for any therapeutic exercise changes, additions or modifications this date.          PT Individual Minutes  Time In: 1339  Time Out: 8883  Minutes: 38  Timed Code Treatment Minutes: 38 Minutes  Procedure Minutes: 0    Activity Minutes Units   Ther Ex 38 3   Manual      Neuro Ed                Signature:  Electronically

## 2018-08-03 NOTE — PROGRESS NOTES
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 LE strength and ROM WFL with pt able to manage symptoms on his own with HEP. Prognosis: Excellent    PLAN:   Plan  Plan Comment: Discharge             ? Patient Status:[] Continue/ Initiate plan of Care    [x] Discharge PT. Recommend pt continue with HEP. [] Additional visits requested, Please re-certify for additional visits:       Signature: Electronically signed by Moon Fletcher PT on 8/3/2018 at 2:06 PM  Objective information by: Electronically signed by Ravi Boothe PTA on 8/3/18 at 1:43 PM    If you have any questions or concerns, please don't hesitate to call. Thank you for your referral.    I have reviewed this plan of care and certify a need for medically necessary rehabilitation services.     Physician Signature:__________________________________________________________  Date:  Please sign and return

## 2018-08-27 ENCOUNTER — OFFICE VISIT (OUTPATIENT)
Dept: FAMILY MEDICINE CLINIC | Age: 63
End: 2018-08-27
Payer: COMMERCIAL

## 2018-08-27 VITALS
RESPIRATION RATE: 16 BRPM | HEART RATE: 80 BPM | SYSTOLIC BLOOD PRESSURE: 136 MMHG | WEIGHT: 205 LBS | BODY MASS INDEX: 31.07 KG/M2 | TEMPERATURE: 97.9 F | DIASTOLIC BLOOD PRESSURE: 84 MMHG | HEIGHT: 68 IN

## 2018-08-27 DIAGNOSIS — Z72.0 TOBACCO ABUSE: ICD-10-CM

## 2018-08-27 DIAGNOSIS — I50.9 CONGESTIVE HEART FAILURE, UNSPECIFIED HF CHRONICITY, UNSPECIFIED HEART FAILURE TYPE (HCC): ICD-10-CM

## 2018-08-27 DIAGNOSIS — I10 ESSENTIAL HYPERTENSION: Primary | ICD-10-CM

## 2018-08-27 DIAGNOSIS — E78.5 HYPERLIPIDEMIA, UNSPECIFIED HYPERLIPIDEMIA TYPE: ICD-10-CM

## 2018-08-27 PROCEDURE — 99213 OFFICE O/P EST LOW 20 MIN: CPT | Performed by: FAMILY MEDICINE

## 2018-08-27 RX ORDER — BUPROPION HYDROCHLORIDE 150 MG/1
150 TABLET, EXTENDED RELEASE ORAL 2 TIMES DAILY
Qty: 60 TABLET | Refills: 3 | Status: SHIPPED | OUTPATIENT
Start: 2018-08-27 | End: 2018-12-12 | Stop reason: SDUPTHER

## 2018-08-27 RX ORDER — VARENICLINE TARTRATE 25 MG
1 KIT ORAL CONTINUOUS
Qty: 60 TABLET | Refills: 0 | Status: SHIPPED | OUTPATIENT
Start: 2018-08-27 | End: 2018-09-04 | Stop reason: SDUPTHER

## 2018-08-27 NOTE — PROGRESS NOTES
Diagnosis Orders   1. Essential hypertension     2. Hyperlipidemia, unspecified hyperlipidemia type     3. Congestive heart failure, unspecified HF chronicity, unspecified heart failure type ([facility] [address]     4. Tobacco abuse       No orders of the defined types were placed in this encounter. No orders of the defined types were placed in this encounter. There are no discontinued medications. No Follow-up on file. reqs chantix and wellbutrin-worked in past    Patient was advised of the risks if continues to smoke and advised to discontinue.  Offered help quitting-medical or behavioral.  No hi/si    Monica Moran MD

## 2018-08-30 ENCOUNTER — TELEPHONE (OUTPATIENT)
Dept: FAMILY MEDICINE CLINIC | Age: 63
End: 2018-08-30

## 2018-08-30 NOTE — TELEPHONE ENCOUNTER
[facility] called, they need better clarification on the Chantix. The starting pack starts with 0.5 mg tabs, the directions say 'Take 1mg by mouth continuous'    Directions need to be changed on it before they can give it to patient please.

## 2018-09-04 RX ORDER — VARENICLINE TARTRATE 25 MG
KIT ORAL
Qty: 1 EACH | Refills: 0 | Status: SHIPPED | OUTPATIENT
Start: 2018-09-04 | End: 2018-12-12

## 2018-09-17 ENCOUNTER — OFFICE VISIT (OUTPATIENT)
Dept: CARDIOLOGY CLINIC | Age: 63
End: 2018-09-17
Payer: COMMERCIAL

## 2018-09-17 VITALS
RESPIRATION RATE: 16 BRPM | OXYGEN SATURATION: 98 % | HEART RATE: 66 BPM | DIASTOLIC BLOOD PRESSURE: 62 MMHG | WEIGHT: 199.6 LBS | BODY MASS INDEX: 30.35 KG/M2 | SYSTOLIC BLOOD PRESSURE: 104 MMHG

## 2018-09-17 DIAGNOSIS — I10 ESSENTIAL HYPERTENSION: ICD-10-CM

## 2018-09-17 DIAGNOSIS — F17.200 SMOKER: ICD-10-CM

## 2018-09-17 DIAGNOSIS — I50.9 CONGESTIVE HEART FAILURE, UNSPECIFIED HF CHRONICITY, UNSPECIFIED HEART FAILURE TYPE (HCC): Primary | ICD-10-CM

## 2018-09-17 PROCEDURE — 99213 OFFICE O/P EST LOW 20 MIN: CPT | Performed by: INTERNAL MEDICINE

## 2018-09-17 RX ORDER — METOPROLOL SUCCINATE 100 MG/1
TABLET, EXTENDED RELEASE ORAL
Qty: 90 TABLET | Refills: 1 | Status: SHIPPED | OUTPATIENT
Start: 2018-09-17 | End: 2019-03-24 | Stop reason: SDUPTHER

## 2018-09-17 ASSESSMENT — ENCOUNTER SYMPTOMS
APNEA: 0
COLOR CHANGE: 0
CHEST TIGHTNESS: 0
SHORTNESS OF BREATH: 0

## 2018-09-17 NOTE — PROGRESS NOTES
LEFT KNEE TOTAL KNEE  ARTHROPLASTY, SUPINE, BLOCK PER TKA PROTOCOL, performed by Chyna Calvert MD at [address] Right 2006    TONSILLECTOMY  1964    TOTAL KNEE ARTHROPLASTY Right 11-3-14       Family History   Problem Relation Age of Onset    High Blood Pressure Father        Social History     Social History    Marital status:      Spouse name: N/A    Number of children: N/A    Years of education: N/A     Social History Main Topics    Smoking status: Current Every Day Smoker     Packs/day: 1.00     Years: 42.00     Types: Cigarettes, Cigars     Start date: 1971    Smokeless tobacco: Never Used      Comment: used wellbutrin in the past and had quit for 5 yrs    Alcohol use No    Drug use: No    Sexual activity: Yes     Partners: Female     Other Topics Concern    None     Social History Narrative    None       No Known Allergies    Current Outpatient Prescriptions   Medication Sig Dispense Refill    varenicline (CHANTIX STARTING MONTH PAK) 0.5 MG X 11 & 1 MG X 42 tablet As directed 1 each 0    buPROPion (WELLBUTRIN SR) 150 MG extended release tablet Take 1 tablet by mouth 2 times daily 60 tablet 3    celecoxib (CELEBREX) 200 MG capsule TAKE 1 CAPSULE BY MOUTH TWO TIMES A  capsule 0    docusate sodium (COLACE) 100 MG capsule Take 1 capsule by mouth 2 times daily 50 capsule 1    aspirin EC 81 MG EC tablet Take 1 tablet by mouth 2 times daily 30 tablet 1    Coenzyme Q10 (CO Q 10 PO) Take by mouth      fluticasone (FLONASE) 50 MCG/ACT nasal spray 1 spray by Nasal route daily 1 Bottle 3    metoprolol succinate (TOPROL XL) 100 MG extended release tablet TAKE ONE TABLET BY MOUTH ONE TIME A DAY 90 tablet 1    atorvastatin (LIPITOR) 20 MG tablet TAKE ONE TABLET BY MOUTH ONE TIME A DAY 90 tablet 1    omeprazole (PRILOSEC) 20 MG delayed release capsule TAKE THREE CAPSULES BY MOUTH EVERY DAY FOR STOMACH 270 capsule 1    tamsulosin (FLOMAX) 0.4 MG capsule Take 1 06/19/2018    GFRAA >60.0 06/19/2018    LABGLOM >60.0 06/19/2018    GLUCOSE 137 06/19/2018     Magnesium:  No results found for: MG  TSH:No results found for: TSHFT4, TSH    Patient Active Problem List   Diagnosis    Hyperlipidemia    Essential hypertension    Depression    GERD (gastroesophageal reflux disease)    Osteoarthritis    CHF (congestive heart failure) (HCC)    Laryngitis from reflux of stomach acid    Lumbar stenosis    Lumbar degenerative disc disease    Osteoarthritis of spine with radiculopathy, lumbar region    Osteoarthritis of left knee       There are no discontinued medications. Modified Medications    No medications on file       No orders of the defined types were placed in this encounter. Assessment:    1. Congestive heart failure, unspecified HF chronicity, unspecified heart failure type ([address]    2. Essential hypertension    3. Smoker       Plan:   Stay on same medications. Patient to see me in 6 months. This note was partially generated using Dragon voice recognition system, and there may be some incorrect words, spellings, punctuation that were not noticed in checking the note before saving.         Electronically signed by Tito Riley MD on 9/18/2018 at 8:05 AM

## 2018-09-30 DIAGNOSIS — E78.5 HYPERLIPIDEMIA: ICD-10-CM

## 2018-10-01 ENCOUNTER — TELEPHONE (OUTPATIENT)
Dept: FAMILY MEDICINE CLINIC | Age: 63
End: 2018-10-01

## 2018-10-01 RX ORDER — ATORVASTATIN CALCIUM 20 MG/1
TABLET, FILM COATED ORAL
Qty: 90 TABLET | Refills: 3 | Status: SHIPPED | OUTPATIENT
Start: 2018-10-01 | End: 2019-05-01 | Stop reason: SDUPTHER

## 2018-10-01 RX ORDER — VARENICLINE TARTRATE 1 MG/1
1 TABLET, FILM COATED ORAL 2 TIMES DAILY
Qty: 60 TABLET | Refills: 2 | Status: SHIPPED | OUTPATIENT
Start: 2018-10-01 | End: 2018-12-12

## 2018-10-01 NOTE — TELEPHONE ENCOUNTER
Per Pt's wife Lyudmila Mcneal, he was started on the chantix starter pack and will be done at the end of this week. He has been on it for 4 weeks. Per Lyudmila Mcneal, she was reading the pamphlet and it says to be on it for 12 weeks and would like a refill for the maintenance dose called in to [facility]. Please advise.

## 2018-10-28 DIAGNOSIS — K21.9 LARYNGITIS FROM REFLUX OF STOMACH ACID: ICD-10-CM

## 2018-10-28 DIAGNOSIS — J04.0 LARYNGITIS FROM REFLUX OF STOMACH ACID: ICD-10-CM

## 2018-10-29 RX ORDER — OMEPRAZOLE 20 MG/1
CAPSULE, DELAYED RELEASE ORAL
Qty: 270 CAPSULE | Refills: 3 | Status: SHIPPED | OUTPATIENT
Start: 2018-10-29 | End: 2019-05-01 | Stop reason: SDUPTHER

## 2018-10-29 RX ORDER — TAMSULOSIN HYDROCHLORIDE 0.4 MG/1
CAPSULE ORAL
Qty: 90 CAPSULE | Refills: 3 | Status: SHIPPED | OUTPATIENT
Start: 2018-10-29 | End: 2019-05-01 | Stop reason: SDUPTHER

## 2018-10-29 RX ORDER — CELECOXIB 200 MG/1
CAPSULE ORAL
Qty: 180 CAPSULE | Refills: 3 | Status: SHIPPED | OUTPATIENT
Start: 2018-10-29 | End: 2019-05-01 | Stop reason: SDUPTHER

## 2018-12-07 DIAGNOSIS — E78.5 HYPERLIPIDEMIA, UNSPECIFIED HYPERLIPIDEMIA TYPE: Primary | ICD-10-CM

## 2018-12-07 DIAGNOSIS — R73.03 PREDIABETES: ICD-10-CM

## 2018-12-07 DIAGNOSIS — I10 ESSENTIAL HYPERTENSION: ICD-10-CM

## 2018-12-08 DIAGNOSIS — R73.03 PREDIABETES: ICD-10-CM

## 2018-12-08 DIAGNOSIS — E78.5 HYPERLIPIDEMIA, UNSPECIFIED HYPERLIPIDEMIA TYPE: ICD-10-CM

## 2018-12-08 DIAGNOSIS — I10 ESSENTIAL HYPERTENSION: ICD-10-CM

## 2018-12-08 LAB
ALBUMIN SERPL-MCNC: 4.5 G/DL (ref 3.9–4.9)
ALP BLD-CCNC: 108 U/L (ref 35–104)
ALT SERPL-CCNC: 32 U/L (ref 0–41)
ANION GAP SERPL CALCULATED.3IONS-SCNC: 11 MEQ/L (ref 7–13)
AST SERPL-CCNC: 28 U/L (ref 0–40)
BILIRUB SERPL-MCNC: 0.5 MG/DL (ref 0–1.2)
BUN BLDV-MCNC: 25 MG/DL (ref 8–23)
CALCIUM SERPL-MCNC: 9.4 MG/DL (ref 8.6–10.2)
CHLORIDE BLD-SCNC: 103 MEQ/L (ref 98–107)
CHOLESTEROL, TOTAL: 145 MG/DL (ref 0–199)
CO2: 25 MEQ/L (ref 22–29)
CREAT SERPL-MCNC: 0.92 MG/DL (ref 0.7–1.2)
GFR AFRICAN AMERICAN: >60
GFR NON-AFRICAN AMERICAN: >60
GLOBULIN: 3.1 G/DL (ref 2.3–3.5)
GLUCOSE BLD-MCNC: 120 MG/DL (ref 74–109)
HBA1C MFR BLD: 5.9 % (ref 4.8–5.9)
HDLC SERPL-MCNC: 53 MG/DL (ref 40–59)
LDL CHOLESTEROL CALCULATED: 81 MG/DL (ref 0–129)
POTASSIUM SERPL-SCNC: 4.9 MEQ/L (ref 3.5–5.1)
SODIUM BLD-SCNC: 139 MEQ/L (ref 132–144)
TOTAL PROTEIN: 7.6 G/DL (ref 6.4–8.1)
TRIGL SERPL-MCNC: 53 MG/DL (ref 0–200)

## 2018-12-12 ENCOUNTER — OFFICE VISIT (OUTPATIENT)
Dept: FAMILY MEDICINE CLINIC | Age: 63
End: 2018-12-12
Payer: COMMERCIAL

## 2018-12-12 ENCOUNTER — TELEPHONE (OUTPATIENT)
Dept: FAMILY MEDICINE CLINIC | Age: 63
End: 2018-12-12

## 2018-12-12 VITALS
HEART RATE: 80 BPM | OXYGEN SATURATION: 97 % | HEIGHT: 68 IN | DIASTOLIC BLOOD PRESSURE: 80 MMHG | BODY MASS INDEX: 31.83 KG/M2 | WEIGHT: 210 LBS | SYSTOLIC BLOOD PRESSURE: 122 MMHG | TEMPERATURE: 97 F | RESPIRATION RATE: 14 BRPM

## 2018-12-12 DIAGNOSIS — K40.91 RECURRENT INGUINAL HERNIA WITHOUT OBSTRUCTION OR GANGRENE, UNSPECIFIED LATERALITY: ICD-10-CM

## 2018-12-12 DIAGNOSIS — F32.A DEPRESSION, UNSPECIFIED DEPRESSION TYPE: ICD-10-CM

## 2018-12-12 DIAGNOSIS — Z12.5 PROSTATE CANCER SCREENING: Primary | ICD-10-CM

## 2018-12-12 DIAGNOSIS — I50.9 CONGESTIVE HEART FAILURE, UNSPECIFIED HF CHRONICITY, UNSPECIFIED HEART FAILURE TYPE (HCC): Primary | ICD-10-CM

## 2018-12-12 DIAGNOSIS — Z12.11 SCREEN FOR COLON CANCER: ICD-10-CM

## 2018-12-12 DIAGNOSIS — I10 ESSENTIAL HYPERTENSION: ICD-10-CM

## 2018-12-12 DIAGNOSIS — K21.9 GASTROESOPHAGEAL REFLUX DISEASE, ESOPHAGITIS PRESENCE NOT SPECIFIED: ICD-10-CM

## 2018-12-12 DIAGNOSIS — Z12.5 PROSTATE CANCER SCREENING: ICD-10-CM

## 2018-12-12 LAB — PROSTATE SPECIFIC ANTIGEN: 5.94 NG/ML (ref 0–5.4)

## 2018-12-12 PROCEDURE — 99214 OFFICE O/P EST MOD 30 MIN: CPT | Performed by: FAMILY MEDICINE

## 2018-12-12 RX ORDER — FLUTICASONE PROPIONATE 50 MCG
1 SPRAY, SUSPENSION (ML) NASAL DAILY
Qty: 1 BOTTLE | Refills: 3 | Status: SHIPPED | OUTPATIENT
Start: 2018-12-12 | End: 2021-11-30 | Stop reason: SDUPTHER

## 2018-12-12 RX ORDER — BUPROPION HYDROCHLORIDE 150 MG/1
150 TABLET, EXTENDED RELEASE ORAL 2 TIMES DAILY
Qty: 60 TABLET | Refills: 3 | Status: SHIPPED | OUTPATIENT
Start: 2018-12-12 | End: 2019-02-25

## 2018-12-12 RX ORDER — IBUPROFEN 800 MG/1
TABLET ORAL
Refills: 1 | COMMUNITY
Start: 2018-11-01 | End: 2018-12-12 | Stop reason: CLARIF

## 2018-12-18 DIAGNOSIS — R97.20 ELEVATED PSA: Primary | ICD-10-CM

## 2019-01-24 ENCOUNTER — OFFICE VISIT (OUTPATIENT)
Dept: UROLOGY | Age: 64
End: 2019-01-24
Payer: COMMERCIAL

## 2019-01-24 VITALS
HEIGHT: 69 IN | BODY MASS INDEX: 31.84 KG/M2 | SYSTOLIC BLOOD PRESSURE: 110 MMHG | DIASTOLIC BLOOD PRESSURE: 80 MMHG | WEIGHT: 215 LBS | HEART RATE: 72 BPM

## 2019-01-24 DIAGNOSIS — R97.20 ELEVATED PSA: ICD-10-CM

## 2019-01-24 DIAGNOSIS — R33.9 URINARY RETENTION: Primary | ICD-10-CM

## 2019-01-24 LAB
BILIRUBIN, POC: NORMAL
BLOOD URINE, POC: NORMAL
CLARITY, POC: CLEAR
COLOR, POC: YELLOW
GLUCOSE URINE, POC: NORMAL
KETONES, POC: NORMAL
LEUKOCYTE EST, POC: NORMAL
NITRITE, POC: NORMAL
PH, POC: 7
POST VOID RESIDUAL (PVR): 135 ML
PROTEIN, POC: NORMAL
SPECIFIC GRAVITY, POC: 1.02
UROBILINOGEN, POC: 0.2

## 2019-01-24 PROCEDURE — 51798 US URINE CAPACITY MEASURE: CPT | Performed by: UROLOGY

## 2019-01-24 PROCEDURE — 81003 URINALYSIS AUTO W/O SCOPE: CPT | Performed by: UROLOGY

## 2019-01-24 PROCEDURE — 99243 OFF/OP CNSLTJ NEW/EST LOW 30: CPT | Performed by: UROLOGY

## 2019-02-22 DIAGNOSIS — R97.20 ELEVATED PSA: ICD-10-CM

## 2019-02-22 LAB — PROSTATE SPECIFIC ANTIGEN: 6.01 NG/ML (ref 0–5.4)

## 2019-02-25 ENCOUNTER — OFFICE VISIT (OUTPATIENT)
Dept: UROLOGY | Age: 64
End: 2019-02-25
Payer: COMMERCIAL

## 2019-02-25 VITALS
BODY MASS INDEX: 31.84 KG/M2 | HEART RATE: 68 BPM | WEIGHT: 215 LBS | SYSTOLIC BLOOD PRESSURE: 122 MMHG | HEIGHT: 69 IN | DIASTOLIC BLOOD PRESSURE: 80 MMHG

## 2019-02-25 DIAGNOSIS — R97.20 ELEVATED PSA: Primary | ICD-10-CM

## 2019-02-25 PROCEDURE — 99214 OFFICE O/P EST MOD 30 MIN: CPT | Performed by: UROLOGY

## 2019-02-25 RX ORDER — CIPROFLOXACIN 500 MG/1
500 TABLET, FILM COATED ORAL 2 TIMES DAILY
Qty: 10 TABLET | Refills: 0 | Status: SHIPPED | OUTPATIENT
Start: 2019-02-25 | End: 2019-03-28 | Stop reason: ALTCHOICE

## 2019-03-07 ENCOUNTER — HOSPITAL ENCOUNTER (OUTPATIENT)
Dept: PREADMISSION TESTING | Age: 64
Discharge: HOME | End: 2019-03-11
Payer: COMMERCIAL

## 2019-03-07 VITALS
DIASTOLIC BLOOD PRESSURE: 83 MMHG | BODY MASS INDEX: 34.06 KG/M2 | RESPIRATION RATE: 16 BRPM | HEART RATE: 70 BPM | TEMPERATURE: 97.4 F | WEIGHT: 217 LBS | HEIGHT: 67 IN | OXYGEN SATURATION: 98 % | SYSTOLIC BLOOD PRESSURE: 155 MMHG

## 2019-03-07 DIAGNOSIS — R97.20 ELEVATED PSA: ICD-10-CM

## 2019-03-07 LAB
ANION GAP SERPL CALCULATED.3IONS-SCNC: 13 MEQ/L (ref 9–15)
BUN BLDV-MCNC: 20 MG/DL (ref 8–23)
CALCIUM SERPL-MCNC: 9.2 MG/DL (ref 8.5–9.9)
CHLORIDE BLD-SCNC: 105 MEQ/L (ref 95–107)
CO2: 25 MEQ/L (ref 20–31)
CREAT SERPL-MCNC: 0.84 MG/DL (ref 0.7–1.2)
EKG ATRIAL RATE: 68 BPM
EKG P AXIS: 34 DEGREES
EKG P-R INTERVAL: 126 MS
EKG Q-T INTERVAL: 402 MS
EKG QRS DURATION: 88 MS
EKG QTC CALCULATION (BAZETT): 427 MS
EKG R AXIS: 52 DEGREES
EKG T AXIS: 54 DEGREES
EKG VENTRICULAR RATE: 68 BPM
GFR AFRICAN AMERICAN: >60
GFR NON-AFRICAN AMERICAN: >60
GLUCOSE BLD-MCNC: 119 MG/DL (ref 70–99)
HCT VFR BLD CALC: 44 % (ref 42–52)
HEMOGLOBIN: 14.8 G/DL (ref 14–18)
MCH RBC QN AUTO: 29.6 PG (ref 27–31.3)
MCHC RBC AUTO-ENTMCNC: 33.5 % (ref 33–37)
MCV RBC AUTO: 88.3 FL (ref 80–100)
PDW BLD-RTO: 14.4 % (ref 11.5–14.5)
PLATELET # BLD: 217 K/UL (ref 130–400)
POTASSIUM SERPL-SCNC: 4.6 MEQ/L (ref 3.4–4.9)
RBC # BLD: 4.98 M/UL (ref 4.7–6.1)
SODIUM BLD-SCNC: 143 MEQ/L (ref 135–144)
WBC # BLD: 5.6 K/UL (ref 4.8–10.8)

## 2019-03-07 PROCEDURE — 93010 ELECTROCARDIOGRAM REPORT: CPT | Performed by: INTERNAL MEDICINE

## 2019-03-07 PROCEDURE — 93005 ELECTROCARDIOGRAM TRACING: CPT

## 2019-03-07 PROCEDURE — 85027 COMPLETE CBC AUTOMATED: CPT

## 2019-03-07 PROCEDURE — 80048 BASIC METABOLIC PNL TOTAL CA: CPT

## 2019-03-07 RX ORDER — LIDOCAINE HYDROCHLORIDE 10 MG/ML
1 INJECTION, SOLUTION EPIDURAL; INFILTRATION; INTRACAUDAL; PERINEURAL
Status: CANCELLED | OUTPATIENT
Start: 2019-03-07 | End: 2019-03-07

## 2019-03-07 RX ORDER — SODIUM CHLORIDE, SODIUM LACTATE, POTASSIUM CHLORIDE, CALCIUM CHLORIDE 600; 310; 30; 20 MG/100ML; MG/100ML; MG/100ML; MG/100ML
INJECTION, SOLUTION INTRAVENOUS CONTINUOUS
Status: CANCELLED | OUTPATIENT
Start: 2019-03-07

## 2019-03-07 RX ORDER — SODIUM CHLORIDE 0.9 % (FLUSH) 0.9 %
10 SYRINGE (ML) INJECTION EVERY 12 HOURS SCHEDULED
Status: CANCELLED | OUTPATIENT
Start: 2019-03-07

## 2019-03-07 RX ORDER — SODIUM CHLORIDE 0.9 % (FLUSH) 0.9 %
10 SYRINGE (ML) INJECTION PRN
Status: CANCELLED | OUTPATIENT
Start: 2019-03-07

## 2019-03-18 ENCOUNTER — OFFICE VISIT (OUTPATIENT)
Dept: CARDIOLOGY CLINIC | Age: 64
End: 2019-03-18
Payer: COMMERCIAL

## 2019-03-18 VITALS
RESPIRATION RATE: 12 BRPM | WEIGHT: 217 LBS | SYSTOLIC BLOOD PRESSURE: 138 MMHG | HEART RATE: 80 BPM | BODY MASS INDEX: 34.06 KG/M2 | HEIGHT: 67 IN | DIASTOLIC BLOOD PRESSURE: 84 MMHG

## 2019-03-18 DIAGNOSIS — I10 ESSENTIAL HYPERTENSION: ICD-10-CM

## 2019-03-18 DIAGNOSIS — I50.9 CONGESTIVE HEART FAILURE, UNSPECIFIED HF CHRONICITY, UNSPECIFIED HEART FAILURE TYPE (HCC): Primary | ICD-10-CM

## 2019-03-18 PROCEDURE — 99214 OFFICE O/P EST MOD 30 MIN: CPT | Performed by: INTERNAL MEDICINE

## 2019-03-18 ASSESSMENT — ENCOUNTER SYMPTOMS
CHEST TIGHTNESS: 0
BLOOD IN STOOL: 0
COUGH: 0
ABDOMINAL PAIN: 0
NAUSEA: 0
COLOR CHANGE: 0
ABDOMINAL DISTENTION: 0
ANAL BLEEDING: 0
WHEEZING: 0
FACIAL SWELLING: 0
VOMITING: 0
APNEA: 0
VOICE CHANGE: 0
SHORTNESS OF BREATH: 0
TROUBLE SWALLOWING: 0
DIARRHEA: 0

## 2019-03-20 ENCOUNTER — ANESTHESIA (OUTPATIENT)
Dept: OPERATING ROOM | Age: 64
End: 2019-03-20
Payer: COMMERCIAL

## 2019-03-20 ENCOUNTER — HOSPITAL ENCOUNTER (OUTPATIENT)
Dept: ULTRASOUND IMAGING | Age: 64
Discharge: HOME | End: 2019-03-22
Attending: UROLOGY
Payer: COMMERCIAL

## 2019-03-20 ENCOUNTER — HOSPITAL ENCOUNTER (OUTPATIENT)
Age: 64
Setting detail: OUTPATIENT SURGERY
Discharge: HOME | End: 2019-03-20
Attending: UROLOGY | Admitting: UROLOGY
Payer: COMMERCIAL

## 2019-03-20 ENCOUNTER — ANESTHESIA EVENT (OUTPATIENT)
Dept: OPERATING ROOM | Age: 64
End: 2019-03-20
Payer: COMMERCIAL

## 2019-03-20 VITALS
RESPIRATION RATE: 31 BRPM | OXYGEN SATURATION: 94 % | SYSTOLIC BLOOD PRESSURE: 124 MMHG | DIASTOLIC BLOOD PRESSURE: 84 MMHG

## 2019-03-20 VITALS
OXYGEN SATURATION: 97 % | WEIGHT: 217 LBS | RESPIRATION RATE: 16 BRPM | HEIGHT: 67 IN | DIASTOLIC BLOOD PRESSURE: 79 MMHG | BODY MASS INDEX: 34.06 KG/M2 | HEART RATE: 66 BPM | TEMPERATURE: 97.2 F | SYSTOLIC BLOOD PRESSURE: 134 MMHG

## 2019-03-20 PROCEDURE — 88342 IMHCHEM/IMCYTCHM 1ST ANTB: CPT

## 2019-03-20 PROCEDURE — 7100000011 HC PHASE II RECOVERY - ADDTL 15 MIN: Performed by: UROLOGY

## 2019-03-20 PROCEDURE — 3600000002 HC SURGERY LEVEL 2 BASE: Performed by: UROLOGY

## 2019-03-20 PROCEDURE — 2580000003 HC RX 258: Performed by: NURSE PRACTITIONER

## 2019-03-20 PROCEDURE — 6360000002 HC RX W HCPCS: Performed by: NURSE PRACTITIONER

## 2019-03-20 PROCEDURE — 7100000010 HC PHASE II RECOVERY - FIRST 15 MIN: Performed by: UROLOGY

## 2019-03-20 PROCEDURE — 88305 TISSUE EXAM BY PATHOLOGIST: CPT

## 2019-03-20 PROCEDURE — 2709999900 HC NON-CHARGEABLE SUPPLY: Performed by: UROLOGY

## 2019-03-20 PROCEDURE — 2500000003 HC RX 250 WO HCPCS: Performed by: NURSE ANESTHETIST, CERTIFIED REGISTERED

## 2019-03-20 PROCEDURE — 55700 PR BIOPSY OF PROSTATE,NEEDLE/PUNCH: CPT | Performed by: UROLOGY

## 2019-03-20 PROCEDURE — 3700000001 HC ADD 15 MINUTES (ANESTHESIA): Performed by: UROLOGY

## 2019-03-20 PROCEDURE — 3700000000 HC ANESTHESIA ATTENDED CARE: Performed by: UROLOGY

## 2019-03-20 PROCEDURE — 3600000012 HC SURGERY LEVEL 2 ADDTL 15MIN: Performed by: UROLOGY

## 2019-03-20 PROCEDURE — 6360000002 HC RX W HCPCS: Performed by: NURSE ANESTHETIST, CERTIFIED REGISTERED

## 2019-03-20 PROCEDURE — 76872 US TRANSRECTAL: CPT | Performed by: UROLOGY

## 2019-03-20 PROCEDURE — 76942 ECHO GUIDE FOR BIOPSY: CPT

## 2019-03-20 PROCEDURE — 2500000003 HC RX 250 WO HCPCS: Performed by: NURSE PRACTITIONER

## 2019-03-20 RX ORDER — LIDOCAINE HYDROCHLORIDE 20 MG/ML
INJECTION, SOLUTION INFILTRATION; PERINEURAL PRN
Status: DISCONTINUED | OUTPATIENT
Start: 2019-03-20 | End: 2019-03-20 | Stop reason: SDUPTHER

## 2019-03-20 RX ORDER — PROPOFOL 10 MG/ML
INJECTION, EMULSION INTRAVENOUS PRN
Status: DISCONTINUED | OUTPATIENT
Start: 2019-03-20 | End: 2019-03-20 | Stop reason: SDUPTHER

## 2019-03-20 RX ORDER — LIDOCAINE HYDROCHLORIDE 10 MG/ML
1 INJECTION, SOLUTION EPIDURAL; INFILTRATION; INTRACAUDAL; PERINEURAL
Status: COMPLETED | OUTPATIENT
Start: 2019-03-20 | End: 2019-03-20

## 2019-03-20 RX ORDER — SODIUM CHLORIDE 0.9 % (FLUSH) 0.9 %
10 SYRINGE (ML) INJECTION PRN
Status: DISCONTINUED | OUTPATIENT
Start: 2019-03-20 | End: 2019-03-20 | Stop reason: HOSPADM

## 2019-03-20 RX ORDER — SODIUM CHLORIDE 0.9 % (FLUSH) 0.9 %
10 SYRINGE (ML) INJECTION EVERY 12 HOURS SCHEDULED
Status: DISCONTINUED | OUTPATIENT
Start: 2019-03-20 | End: 2019-03-20 | Stop reason: HOSPADM

## 2019-03-20 RX ORDER — SODIUM CHLORIDE, SODIUM LACTATE, POTASSIUM CHLORIDE, CALCIUM CHLORIDE 600; 310; 30; 20 MG/100ML; MG/100ML; MG/100ML; MG/100ML
INJECTION, SOLUTION INTRAVENOUS CONTINUOUS
Status: DISCONTINUED | OUTPATIENT
Start: 2019-03-20 | End: 2019-03-20 | Stop reason: HOSPADM

## 2019-03-20 RX ADMIN — LIDOCAINE HYDROCHLORIDE 1 ML: 10 INJECTION, SOLUTION EPIDURAL; INFILTRATION; INTRACAUDAL; PERINEURAL at 06:42

## 2019-03-20 RX ADMIN — PROPOFOL 50 MG: 10 INJECTION, EMULSION INTRAVENOUS at 07:31

## 2019-03-20 RX ADMIN — GENTAMICIN SULFATE 200 MG: 40 INJECTION, SOLUTION INTRAMUSCULAR; INTRAVENOUS at 07:24

## 2019-03-20 RX ADMIN — PROPOFOL 50 MG: 10 INJECTION, EMULSION INTRAVENOUS at 07:26

## 2019-03-20 RX ADMIN — PROPOFOL 50 MG: 10 INJECTION, EMULSION INTRAVENOUS at 07:29

## 2019-03-20 RX ADMIN — PROPOFOL 50 MG: 10 INJECTION, EMULSION INTRAVENOUS at 07:27

## 2019-03-20 RX ADMIN — LIDOCAINE HYDROCHLORIDE 60 MG: 20 INJECTION, SOLUTION INFILTRATION; PERINEURAL at 07:26

## 2019-03-20 RX ADMIN — SODIUM CHLORIDE, POTASSIUM CHLORIDE, SODIUM LACTATE AND CALCIUM CHLORIDE: 600; 310; 30; 20 INJECTION, SOLUTION INTRAVENOUS at 06:42

## 2019-03-20 ASSESSMENT — PULMONARY FUNCTION TESTS
PIF_VALUE: 2
PIF_VALUE: 16
PIF_VALUE: 0
PIF_VALUE: 1
PIF_VALUE: 16
PIF_VALUE: 0
PIF_VALUE: 16
PIF_VALUE: 2
PIF_VALUE: 16
PIF_VALUE: 1
PIF_VALUE: 16
PIF_VALUE: 16
PIF_VALUE: 17
PIF_VALUE: 16

## 2019-03-20 ASSESSMENT — PAIN - FUNCTIONAL ASSESSMENT: PAIN_FUNCTIONAL_ASSESSMENT: 0-10

## 2019-03-20 ASSESSMENT — PAIN SCALES - GENERAL
PAINLEVEL_OUTOF10: 0
PAINLEVEL_OUTOF10: 0

## 2019-03-26 ENCOUNTER — TELEPHONE (OUTPATIENT)
Dept: FAMILY MEDICINE CLINIC | Age: 64
End: 2019-03-26

## 2019-03-27 RX ORDER — METOPROLOL SUCCINATE 100 MG/1
TABLET, EXTENDED RELEASE ORAL
Qty: 30 TABLET | Refills: 2 | Status: SHIPPED | OUTPATIENT
Start: 2019-03-27 | End: 2019-05-01 | Stop reason: SDUPTHER

## 2019-03-28 ENCOUNTER — OFFICE VISIT (OUTPATIENT)
Dept: UROLOGY | Age: 64
End: 2019-03-28
Payer: COMMERCIAL

## 2019-03-28 VITALS
WEIGHT: 210 LBS | SYSTOLIC BLOOD PRESSURE: 138 MMHG | HEIGHT: 69 IN | BODY MASS INDEX: 31.1 KG/M2 | DIASTOLIC BLOOD PRESSURE: 88 MMHG | HEART RATE: 75 BPM

## 2019-03-28 DIAGNOSIS — C61 PROSTATE CANCER (HCC): Primary | ICD-10-CM

## 2019-03-28 PROCEDURE — 99214 OFFICE O/P EST MOD 30 MIN: CPT | Performed by: UROLOGY

## 2019-05-01 ENCOUNTER — OFFICE VISIT (OUTPATIENT)
Dept: FAMILY MEDICINE CLINIC | Age: 64
End: 2019-05-01
Payer: COMMERCIAL

## 2019-05-01 VITALS
HEIGHT: 69 IN | SYSTOLIC BLOOD PRESSURE: 126 MMHG | DIASTOLIC BLOOD PRESSURE: 82 MMHG | RESPIRATION RATE: 16 BRPM | TEMPERATURE: 98.5 F | BODY MASS INDEX: 30.6 KG/M2 | HEART RATE: 70 BPM | OXYGEN SATURATION: 98 % | WEIGHT: 206.6 LBS

## 2019-05-01 DIAGNOSIS — E78.2 MIXED HYPERLIPIDEMIA: Primary | ICD-10-CM

## 2019-05-01 DIAGNOSIS — J04.0 LARYNGITIS FROM REFLUX OF STOMACH ACID: ICD-10-CM

## 2019-05-01 DIAGNOSIS — K21.9 LARYNGITIS FROM REFLUX OF STOMACH ACID: ICD-10-CM

## 2019-05-01 PROCEDURE — 99213 OFFICE O/P EST LOW 20 MIN: CPT | Performed by: FAMILY MEDICINE

## 2019-05-01 RX ORDER — TAMSULOSIN HYDROCHLORIDE 0.4 MG/1
CAPSULE ORAL
Qty: 90 CAPSULE | Refills: 3 | Status: SHIPPED | OUTPATIENT
Start: 2019-05-01 | End: 2020-05-26

## 2019-05-01 RX ORDER — ATORVASTATIN CALCIUM 20 MG/1
TABLET, FILM COATED ORAL
Qty: 90 TABLET | Refills: 3 | Status: SHIPPED | OUTPATIENT
Start: 2019-05-01 | End: 2020-07-13

## 2019-05-01 RX ORDER — METOPROLOL SUCCINATE 100 MG/1
TABLET, EXTENDED RELEASE ORAL
Qty: 90 TABLET | Refills: 3 | Status: SHIPPED | OUTPATIENT
Start: 2019-05-01 | End: 2020-07-01 | Stop reason: SDUPTHER

## 2019-05-01 RX ORDER — CELECOXIB 200 MG/1
CAPSULE ORAL
Qty: 180 CAPSULE | Refills: 3 | Status: SHIPPED | OUTPATIENT
Start: 2019-05-01 | End: 2020-05-26

## 2019-05-01 RX ORDER — OMEPRAZOLE 20 MG/1
CAPSULE, DELAYED RELEASE ORAL
Qty: 270 CAPSULE | Refills: 3 | Status: SHIPPED | OUTPATIENT
Start: 2019-05-01 | End: 2020-07-01

## 2019-05-01 NOTE — PROGRESS NOTES
Negative for eye discharge and vision loss. Negative for ear drainage, hearing loss and nasal drainage. Respiratory: Negative for cough, dyspnea and wheezing. Cardiovascular:  Negative for chest pain, claudication and irregular heartbeat/palpitations. Gastrointestinal: Negative for abdominal pain, nausea, vomiting, constipation and diarrhea. Genitourinary: No dysuria or penile discharge. Metabolic/Endocrine: Negative for cold intolerance, heat intolerance, polydipsia and polyphagia. No unintended weight loss or gain. Neuro/Psychiatric: Negative for gait disturbance. Negative for psychiatric symptoms. Dermatologic: Negative for pruritus and rash. Musculoskeletal: positive for bone/joint symptoms. No numbness or tingling. No weakness. Hematology: Negative for bleeding and easy bruising. Immunology:  Negative for environmental allergies and food allergies. Physical Exam    Patient's medication, allergies, past medical, surgical, social and family histories were reviewed and updated as appropriate. PHYSICAL EXAM   General appearance: Alert oriented pleasant cooperative in no acute distress. HEENT: Eyes clear nonicteric facial muscles symmetrical.  Excellent  Neck: Soft nontender no adenopathy no masses no carotid bruits  Lungs: Clear to auscultation no wheezes rhonchi or rales  Extremities: No rashes or edema. Assessment:   Diagnosis Orders   1. Hyperlipidemia  atorvastatin (LIPITOR) 20 MG tablet    He is commended for his weight loss will follow-up and get all of his blood work at follow-up including hepatitis C the PSA CMP and lipids and we'll order the colonoscopy at that time as well.      2. Laryngitis from reflux of stomach acid  omeprazole (PRILOSEC) 20 MG delayed release capsule               Plan:  Current Outpatient Medications   Medication Sig Dispense Refill    atorvastatin (LIPITOR) 20 MG tablet TAKE ONE TABLET BY MOUTH ONE TIME A DAY 90 tablet 3    celecoxib (CELEBREX) 200 MG capsule TAKE ONE CAPSULE BY MOUTH TWICE A  capsule 3    metoprolol succinate (TOPROL XL) 100 MG extended release tablet TAKE 1 TABLET BY MOUTH ONE TIME A DAY 90 tablet 3    omeprazole (PRILOSEC) 20 MG delayed release capsule TAKE 3 CAPSULES BY MOUTH EVERY DAY FOR STOMACH 270 capsule 3    tamsulosin (FLOMAX) 0.4 MG capsule TAKE ONE CAPSULE BY MOUTH ONE TIME A DAY 90 capsule 3    fluticasone (FLONASE) 50 MCG/ACT nasal spray 1 spray by Nasal route daily 1 Bottle 3    Coenzyme Q10 (CO Q 10 PO) Take by mouth      Multiple Vitamin (MULTI VITAMIN DAILY PO) Take  by mouth 2 times daily.  magnesium oxide (MAG-OX) 400 MG tablet Take 400 mg by mouth daily.  Ascorbic Acid (VITAMIN C) 1000 MG tablet Take 1,000 mg by mouth daily.  B Complex Vitamins (VITAMIN-B COMPLEX PO) Take  by mouth 2 times daily.  Misc Natural Products (OSTEO BI-FLEX JOINT SHIELD PO) Take  by mouth daily.  Omega-3 Fatty Acids (FISH OIL) 1200 MG CAPS Take  by mouth 2 times daily. No current facility-administered medications for this visit. No orders of the defined types were placed in this encounter. Orders Placed This Encounter   Medications    atorvastatin (LIPITOR) 20 MG tablet     Sig: TAKE ONE TABLET BY MOUTH ONE TIME A DAY     Dispense:  90 tablet     Refill:  3    celecoxib (CELEBREX) 200 MG capsule     Sig: TAKE ONE CAPSULE BY MOUTH TWICE A DAY     Dispense:  180 capsule     Refill:  3    metoprolol succinate (TOPROL XL) 100 MG extended release tablet     Sig: TAKE 1 TABLET BY MOUTH ONE TIME A DAY     Dispense:  90 tablet     Refill:  3    omeprazole (PRILOSEC) 20 MG delayed release capsule     Sig: TAKE 3 CAPSULES BY MOUTH EVERY DAY FOR STOMACH     Dispense:  270 capsule     Refill:  3    tamsulosin (FLOMAX) 0.4 MG capsule     Sig: TAKE ONE CAPSULE BY MOUTH ONE TIME A DAY     Dispense:  90 capsule     Refill:  3              Return in about 4 months (around 9/1/2019).     Dr. Danny Gutierrez Nicholas      5/1/19  4:27 PM

## 2019-07-08 ENCOUNTER — HOSPITAL ENCOUNTER (EMERGENCY)
Age: 64
Discharge: HOME | End: 2019-07-08
Attending: EMERGENCY MEDICINE
Payer: COMMERCIAL

## 2019-07-08 VITALS
OXYGEN SATURATION: 96 % | HEIGHT: 68 IN | DIASTOLIC BLOOD PRESSURE: 81 MMHG | WEIGHT: 210 LBS | HEART RATE: 71 BPM | BODY MASS INDEX: 31.83 KG/M2 | TEMPERATURE: 97.8 F | RESPIRATION RATE: 18 BRPM | SYSTOLIC BLOOD PRESSURE: 122 MMHG

## 2019-07-08 DIAGNOSIS — R19.7 DIARRHEA, UNSPECIFIED TYPE: Primary | ICD-10-CM

## 2019-07-08 LAB
ALBUMIN SERPL-MCNC: 4.2 G/DL (ref 3.5–4.6)
ALP BLD-CCNC: 114 U/L (ref 35–104)
ALT SERPL-CCNC: 21 U/L (ref 0–41)
ANION GAP SERPL CALCULATED.3IONS-SCNC: 12 MEQ/L (ref 9–15)
AST SERPL-CCNC: 21 U/L (ref 0–40)
BASOPHILS ABSOLUTE: 0 K/UL (ref 0–0.2)
BASOPHILS RELATIVE PERCENT: 0.4 %
BILIRUB SERPL-MCNC: 0.4 MG/DL (ref 0.2–0.7)
BUN BLDV-MCNC: 21 MG/DL (ref 8–23)
CALCIUM SERPL-MCNC: 8.7 MG/DL (ref 8.5–9.9)
CHLORIDE BLD-SCNC: 102 MEQ/L (ref 95–107)
CO2: 25 MEQ/L (ref 20–31)
CREAT SERPL-MCNC: 0.75 MG/DL (ref 0.7–1.2)
EOSINOPHILS ABSOLUTE: 0.2 K/UL (ref 0–0.7)
EOSINOPHILS RELATIVE PERCENT: 1.4 %
GFR AFRICAN AMERICAN: >60
GFR NON-AFRICAN AMERICAN: >60
GLOBULIN: 2.5 G/DL (ref 2.3–3.5)
GLUCOSE BLD-MCNC: 142 MG/DL (ref 70–99)
HCT VFR BLD CALC: 45.1 % (ref 42–52)
HEMOGLOBIN: 15.2 G/DL (ref 14–18)
LYMPHOCYTES ABSOLUTE: 1.8 K/UL (ref 1–4.8)
LYMPHOCYTES RELATIVE PERCENT: 14.2 %
MAGNESIUM: 2.1 MG/DL (ref 1.7–2.4)
MCH RBC QN AUTO: 29.7 PG (ref 27–31.3)
MCHC RBC AUTO-ENTMCNC: 33.7 % (ref 33–37)
MCV RBC AUTO: 88.3 FL (ref 80–100)
MONOCYTES ABSOLUTE: 1 K/UL (ref 0.2–0.8)
MONOCYTES RELATIVE PERCENT: 7.9 %
NEUTROPHILS ABSOLUTE: 9.6 K/UL (ref 1.4–6.5)
NEUTROPHILS RELATIVE PERCENT: 76.1 %
PDW BLD-RTO: 15.5 % (ref 11.5–14.5)
PLATELET # BLD: 219 K/UL (ref 130–400)
POTASSIUM SERPL-SCNC: 4.6 MEQ/L (ref 3.4–4.9)
RBC # BLD: 5.11 M/UL (ref 4.7–6.1)
SODIUM BLD-SCNC: 139 MEQ/L (ref 135–144)
TOTAL PROTEIN: 6.7 G/DL (ref 6.3–8)
WBC # BLD: 12.6 K/UL (ref 4.8–10.8)

## 2019-07-08 PROCEDURE — 36415 COLL VENOUS BLD VENIPUNCTURE: CPT

## 2019-07-08 PROCEDURE — 6360000002 HC RX W HCPCS: Performed by: EMERGENCY MEDICINE

## 2019-07-08 PROCEDURE — 96372 THER/PROPH/DIAG INJ SC/IM: CPT

## 2019-07-08 PROCEDURE — 80053 COMPREHEN METABOLIC PANEL: CPT

## 2019-07-08 PROCEDURE — 83735 ASSAY OF MAGNESIUM: CPT

## 2019-07-08 PROCEDURE — 99283 EMERGENCY DEPT VISIT LOW MDM: CPT

## 2019-07-08 PROCEDURE — 2580000003 HC RX 258: Performed by: EMERGENCY MEDICINE

## 2019-07-08 PROCEDURE — 85025 COMPLETE CBC W/AUTO DIFF WBC: CPT

## 2019-07-08 RX ORDER — DICYCLOMINE HYDROCHLORIDE 10 MG/ML
20 INJECTION INTRAMUSCULAR ONCE
Status: COMPLETED | OUTPATIENT
Start: 2019-07-08 | End: 2019-07-08

## 2019-07-08 RX ORDER — 0.9 % SODIUM CHLORIDE 0.9 %
500 INTRAVENOUS SOLUTION INTRAVENOUS ONCE
Status: COMPLETED | OUTPATIENT
Start: 2019-07-08 | End: 2019-07-08

## 2019-07-08 RX ADMIN — SODIUM CHLORIDE 500 ML: 9 INJECTION, SOLUTION INTRAVENOUS at 21:17

## 2019-07-08 RX ADMIN — DICYCLOMINE HYDROCHLORIDE 20 MG: 20 INJECTION, SOLUTION INTRAMUSCULAR at 21:20

## 2019-07-08 ASSESSMENT — PAIN DESCRIPTION - LOCATION: LOCATION: ABDOMEN

## 2019-07-08 ASSESSMENT — PAIN SCALES - GENERAL
PAINLEVEL_OUTOF10: 0
PAINLEVEL_OUTOF10: 10

## 2019-07-08 ASSESSMENT — ENCOUNTER SYMPTOMS: DIARRHEA: 1

## 2019-07-09 NOTE — ED PROVIDER NOTES
TOTAL KNEE ARTHROPLASTY Left 6/18/2018    LEFT KNEE TOTAL KNEE  ARTHROPLASTY, SUPINE, BLOCK PER TKA PROTOCOL, performed by Radha Maldonado MD at [place] [address] Right 2006    TONSILLECTOMY  1964    TOTAL KNEE ARTHROPLASTY Right 11-3-14    ULTRASOUND PROSTATE/TRANSRECTAL N/A 3/20/2019    PROSTATE TRANSRECTAL ULTRASOUND BIOPSY performed by Nubia Clarke MD at [facility]       Previous Medications    ASCORBIC ACID (VITAMIN C) 1000 MG TABLET    Take 1,000 mg by mouth daily. ATORVASTATIN (LIPITOR) 20 MG TABLET    TAKE ONE TABLET BY MOUTH ONE TIME A DAY    B COMPLEX VITAMINS (VITAMIN-B COMPLEX PO)    Take  by mouth 2 times daily. CELECOXIB (CELEBREX) 200 MG CAPSULE    TAKE ONE CAPSULE BY MOUTH TWICE A DAY    COENZYME Q10 (CO Q 10 PO)    Take by mouth    FLUTICASONE (FLONASE) 50 MCG/ACT NASAL SPRAY    1 spray by Nasal route daily    MAGNESIUM OXIDE (MAG-OX) 400 MG TABLET    Take 400 mg by mouth daily. METOPROLOL SUCCINATE (TOPROL XL) 100 MG EXTENDED RELEASE TABLET    TAKE 1 TABLET BY MOUTH ONE TIME A DAY    MISC NATURAL PRODUCTS (OSTEO BI-FLEX JOINT SHIELD PO)    Take  by mouth daily. MULTIPLE VITAMIN (MULTI VITAMIN DAILY PO)    Take  by mouth 2 times daily. OMEGA-3 FATTY ACIDS (FISH OIL) 1200 MG CAPS    Take  by mouth 2 times daily. OMEPRAZOLE (PRILOSEC) 20 MG DELAYED RELEASE CAPSULE    TAKE 3 CAPSULES BY MOUTH EVERY DAY FOR STOMACH    TAMSULOSIN (FLOMAX) 0.4 MG CAPSULE    TAKE ONE CAPSULE BY MOUTH ONE TIME A DAY       ALLERGIES     Patient has no known allergies.     FAMILY HISTORY       Family History   Problem Relation Age of Onset    Thyroid Disease Mother     Macular Degen Mother     High Blood Pressure Father     Heart Attack Father     No Known Problems Brother     Other Brother         viral pneumonia at age 25s    No Known Problems Son           SOCIAL HISTORY       Social History     Socioeconomic History    Marital status:  Pulmonary/Chest: Effort normal and breath sounds normal.   Abdominal: Soft. Bowel sounds are normal. There is no tenderness. There is no guarding. Musculoskeletal: Normal range of motion. He exhibits no deformity. Neurological: He is alert and oriented to person, place, and time. No cranial nerve deficit. Skin: Skin is warm and dry. Psychiatric: He has a normal mood and affect. Thought content normal.       DIAGNOSTIC RESULTS     EKG: All EKG's are interpreted by the Emergency Department Physician who either signs or Co-signs this chart in the absence of a cardiologist.    RADIOLOGY:   Non-plain film images such as CT, Ultrasound and MRI are read by the radiologist. Plain radiographic images are visualized and preliminarily interpreted by the emergency physician with the below findings:    Interpretation per the Radiologist below, if available at the time of this note:    No orders to display       LABS:  Labs Reviewed   CBC WITH AUTO DIFFERENTIAL - Abnormal; Notable for the following components:       Result Value    WBC 12.6 (*)     RDW 15.5 (*)     Neutrophils # 9.6 (*)     Monocytes # 1.0 (*)     All other components within normal limits   COMPREHENSIVE METABOLIC PANEL - Abnormal; Notable for the following components:    Glucose 142 (*)     Alkaline Phosphatase 114 (*)     All other components within normal limits   MAGNESIUM       All other labs were within normal range or not returned as of this dictation. EMERGENCY DEPARTMENT COURSE and DIFFERENTIAL DIAGNOSIS/MDM:   Vitals:    Vitals:    07/08/19 1952   BP: (!) 153/91   Pulse: 88   Resp: 18   Temp: 97.8 °F (36.6 °C)   TempSrc: Oral   SpO2: 95%   Weight: 210 lb (95.3 kg)   Height: 5' 8\" (1.727 m)       MDM  Number of Diagnoses or Management Options  Diarrhea, unspecified type: minor  Diagnosis management comments: Patient presents with multiple episodes of diarrhea.   Blood work does not show significantly elevated white count/ fever to signify a

## 2019-07-10 ENCOUNTER — OFFICE VISIT (OUTPATIENT)
Dept: FAMILY MEDICINE CLINIC | Age: 64
End: 2019-07-10
Payer: COMMERCIAL

## 2019-07-10 VITALS
HEIGHT: 68 IN | TEMPERATURE: 99.2 F | HEART RATE: 91 BPM | SYSTOLIC BLOOD PRESSURE: 128 MMHG | BODY MASS INDEX: 30.37 KG/M2 | RESPIRATION RATE: 16 BRPM | DIASTOLIC BLOOD PRESSURE: 78 MMHG | WEIGHT: 200.4 LBS | OXYGEN SATURATION: 98 %

## 2019-07-10 DIAGNOSIS — R10.30 LOWER ABDOMINAL PAIN: ICD-10-CM

## 2019-07-10 DIAGNOSIS — I10 ESSENTIAL HYPERTENSION: ICD-10-CM

## 2019-07-10 DIAGNOSIS — R50.81 FEVER IN OTHER DISEASES: ICD-10-CM

## 2019-07-10 DIAGNOSIS — R19.7 DIARRHEA OF PRESUMED INFECTIOUS ORIGIN: Primary | ICD-10-CM

## 2019-07-10 DIAGNOSIS — Z12.11 SCREEN FOR COLON CANCER: ICD-10-CM

## 2019-07-10 DIAGNOSIS — R19.7 DIARRHEA OF PRESUMED INFECTIOUS ORIGIN: ICD-10-CM

## 2019-07-10 DIAGNOSIS — F17.200 NICOTINE USE DISORDER: ICD-10-CM

## 2019-07-10 PROCEDURE — 1111F DSCHRG MED/CURRENT MED MERGE: CPT | Performed by: PHYSICIAN ASSISTANT

## 2019-07-10 PROCEDURE — 99214 OFFICE O/P EST MOD 30 MIN: CPT | Performed by: PHYSICIAN ASSISTANT

## 2019-07-10 RX ORDER — GREEN TEA/HOODIA GORDONII 315-12.5MG
1 CAPSULE ORAL 2 TIMES DAILY
Qty: 60 TABLET | Refills: 0 | Status: SHIPPED | OUTPATIENT
Start: 2019-07-10 | End: 2019-08-09

## 2019-07-10 RX ORDER — METRONIDAZOLE 250 MG/1
250 TABLET ORAL 3 TIMES DAILY
Qty: 21 TABLET | Refills: 0 | Status: SHIPPED | OUTPATIENT
Start: 2019-07-10 | End: 2019-07-17

## 2019-07-10 ASSESSMENT — ENCOUNTER SYMPTOMS
SORE THROAT: 0
BACK PAIN: 1
ABDOMINAL PAIN: 1
CONSTIPATION: 0
NAUSEA: 0
EYE PAIN: 0
SHORTNESS OF BREATH: 0
DIARRHEA: 1
COUGH: 0
VOMITING: 0
WHEEZING: 0

## 2019-07-10 NOTE — PROGRESS NOTES
Subjective  Hunter Parish, 60 y.o. male presents today with:  Chief Complaint   Patient presents with    Follow-Up from Hospital     Patient following up after being seen at [facility] ER 7/8/19 for diarrhea x24 hours. Patient was given bentyl and sent home the same day. Patient states he is still having the diarrhea. Patient states he was up all night and was getting chills.  Health Maintenance     Colonscopy. Patient declines the rest of HM until follow up with Dr. Alisha Blue       HPI   Pt of López Mccoy MD is here for hospital follow-up with complaint of diarrhea and abdominal cramping for the past week and a half. symptoms began after a breakfast social.. states no one else became ill at the event. denies n/v black tarry stools following brat diet having approximately 5-6 watery stools per day  pt noted to have a low-grade temperature he denies cough chest congestion earache  Last colonoscopy 12 years ago  Pt had quit smoking on chantix but he has again resumed smoking - he has an appointment with his PCP in September and will discuss resuming   Chantix at that time  Review of Systems   Constitutional: Positive for chills and fatigue. HENT: Negative for congestion and sore throat. Eyes: Negative for pain. Respiratory: Negative for cough, shortness of breath and wheezing. Gastrointestinal: Positive for abdominal pain and diarrhea. Negative for constipation, nausea and vomiting. Genitourinary: Negative for dysuria. Musculoskeletal: Positive for back pain. Negative for neck pain. Skin: Negative for rash. Allergic/Immunologic: Negative for environmental allergies and food allergies. Neurological: Positive for light-headedness. Negative for dizziness and headaches. Psychiatric/Behavioral: Positive for sleep disturbance.          Past Medical History:   Diagnosis Date    Anemia     Depression     Essential hypertension     GERD (gastroesophageal reflux disease)     Hyperlipidemia     meds > 5 yrs   

## 2019-07-11 LAB
C DIFF TOXIN/ANTIGEN: NORMAL
GI BACTERIAL PATHOGENS BY PCR: NORMAL

## 2019-07-17 ENCOUNTER — TELEPHONE (OUTPATIENT)
Dept: ENDOSCOPY | Age: 64
End: 2019-07-17

## 2019-08-09 ENCOUNTER — ANESTHESIA EVENT (OUTPATIENT)
Dept: ENDOSCOPY | Age: 64
End: 2019-08-09
Payer: COMMERCIAL

## 2019-08-09 ENCOUNTER — ANESTHESIA (OUTPATIENT)
Dept: ENDOSCOPY | Age: 64
End: 2019-08-09
Payer: COMMERCIAL

## 2019-08-09 ENCOUNTER — HOSPITAL ENCOUNTER (OUTPATIENT)
Age: 64
Setting detail: OUTPATIENT SURGERY
Discharge: HOME | End: 2019-08-09
Attending: SPECIALIST | Admitting: SPECIALIST
Payer: COMMERCIAL

## 2019-08-09 VITALS
TEMPERATURE: 97.6 F | OXYGEN SATURATION: 95 % | BODY MASS INDEX: 30.31 KG/M2 | SYSTOLIC BLOOD PRESSURE: 113 MMHG | HEIGHT: 68 IN | RESPIRATION RATE: 16 BRPM | HEART RATE: 70 BPM | DIASTOLIC BLOOD PRESSURE: 74 MMHG | WEIGHT: 200 LBS

## 2019-08-09 VITALS
RESPIRATION RATE: 22 BRPM | SYSTOLIC BLOOD PRESSURE: 117 MMHG | DIASTOLIC BLOOD PRESSURE: 68 MMHG | OXYGEN SATURATION: 93 %

## 2019-08-09 PROCEDURE — 45380 COLONOSCOPY AND BIOPSY: CPT | Performed by: SPECIALIST

## 2019-08-09 PROCEDURE — 6360000002 HC RX W HCPCS: Performed by: NURSE ANESTHETIST, CERTIFIED REGISTERED

## 2019-08-09 PROCEDURE — 2580000003 HC RX 258: Performed by: SPECIALIST

## 2019-08-09 PROCEDURE — 2580000003 HC RX 258: Performed by: NURSE ANESTHETIST, CERTIFIED REGISTERED

## 2019-08-09 PROCEDURE — 3700000001 HC ADD 15 MINUTES (ANESTHESIA): Performed by: SPECIALIST

## 2019-08-09 PROCEDURE — 3609027000 HC COLONOSCOPY: Performed by: SPECIALIST

## 2019-08-09 PROCEDURE — 2500000003 HC RX 250 WO HCPCS: Performed by: NURSE ANESTHETIST, CERTIFIED REGISTERED

## 2019-08-09 PROCEDURE — 7100000011 HC PHASE II RECOVERY - ADDTL 15 MIN: Performed by: SPECIALIST

## 2019-08-09 PROCEDURE — 7100000010 HC PHASE II RECOVERY - FIRST 15 MIN: Performed by: SPECIALIST

## 2019-08-09 PROCEDURE — 3700000000 HC ANESTHESIA ATTENDED CARE: Performed by: SPECIALIST

## 2019-08-09 PROCEDURE — 88305 TISSUE EXAM BY PATHOLOGIST: CPT

## 2019-08-09 PROCEDURE — 45385 COLONOSCOPY W/LESION REMOVAL: CPT | Performed by: SPECIALIST

## 2019-08-09 RX ORDER — SODIUM CHLORIDE 0.9 % (FLUSH) 0.9 %
10 SYRINGE (ML) INJECTION PRN
Status: DISCONTINUED | OUTPATIENT
Start: 2019-08-09 | End: 2019-08-09 | Stop reason: HOSPADM

## 2019-08-09 RX ORDER — SODIUM CHLORIDE 9 MG/ML
INJECTION, SOLUTION INTRAVENOUS CONTINUOUS PRN
Status: DISCONTINUED | OUTPATIENT
Start: 2019-08-09 | End: 2019-08-09 | Stop reason: SDUPTHER

## 2019-08-09 RX ORDER — SODIUM CHLORIDE 0.9 % (FLUSH) 0.9 %
10 SYRINGE (ML) INJECTION EVERY 12 HOURS SCHEDULED
Status: DISCONTINUED | OUTPATIENT
Start: 2019-08-09 | End: 2019-08-09 | Stop reason: HOSPADM

## 2019-08-09 RX ORDER — SODIUM CHLORIDE 9 MG/ML
INJECTION, SOLUTION INTRAVENOUS CONTINUOUS
Status: DISCONTINUED | OUTPATIENT
Start: 2019-08-09 | End: 2019-08-09 | Stop reason: HOSPADM

## 2019-08-09 RX ORDER — PROPOFOL 10 MG/ML
INJECTION, EMULSION INTRAVENOUS PRN
Status: DISCONTINUED | OUTPATIENT
Start: 2019-08-09 | End: 2019-08-09 | Stop reason: SDUPTHER

## 2019-08-09 RX ORDER — LIDOCAINE HYDROCHLORIDE 10 MG/ML
INJECTION, SOLUTION INFILTRATION; PERINEURAL PRN
Status: DISCONTINUED | OUTPATIENT
Start: 2019-08-09 | End: 2019-08-09 | Stop reason: SDUPTHER

## 2019-08-09 RX ORDER — ONDANSETRON 2 MG/ML
4 INJECTION INTRAMUSCULAR; INTRAVENOUS
Status: DISCONTINUED | OUTPATIENT
Start: 2019-08-09 | End: 2019-08-09 | Stop reason: HOSPADM

## 2019-08-09 RX ORDER — LIDOCAINE HYDROCHLORIDE 10 MG/ML
1 INJECTION, SOLUTION EPIDURAL; INFILTRATION; INTRACAUDAL; PERINEURAL
Status: DISCONTINUED | OUTPATIENT
Start: 2019-08-09 | End: 2019-08-09 | Stop reason: HOSPADM

## 2019-08-09 RX ADMIN — SODIUM CHLORIDE: 9 INJECTION, SOLUTION INTRAVENOUS at 07:07

## 2019-08-09 RX ADMIN — LIDOCAINE HYDROCHLORIDE 30 MG: 10 INJECTION, SOLUTION INFILTRATION; PERINEURAL at 07:33

## 2019-08-09 RX ADMIN — SODIUM CHLORIDE: 9 INJECTION, SOLUTION INTRAVENOUS at 07:28

## 2019-08-09 RX ADMIN — PROPOFOL 220 MG: 10 INJECTION, EMULSION INTRAVENOUS at 07:33

## 2019-08-09 NOTE — ANESTHESIA PRE PROCEDURE
[address]    Anesthesia Evaluation  Patient summary reviewed and Nursing notes reviewed history of anesthetic complications:   Airway: Mallampati: II  TM distance: >3 FB   Neck ROM: full  Mouth opening: > = 3 FB Dental:          Pulmonary:normal exam              Patient did not smoke on day of surgery. ROS comment: 42 pk yrs, x-smoker   Cardiovascular:    (+) hypertension:, CHF:,                   Neuro/Psych:   (+) neuromuscular disease:, psychiatric history:            GI/Hepatic/Renal:   (+) GERD:, bowel prep,           Endo/Other: Negative Endo/Other ROS                    Abdominal:   (+) obese,         Vascular: negative vascular ROS. Anesthesia Plan      MAC     ASA 2       Induction: intravenous. Anesthetic plan and risks discussed with patient. Plan discussed with attending.                   Rosellen Nageotte, APRN - CRNA   8/9/2019

## 2019-09-04 ENCOUNTER — OFFICE VISIT (OUTPATIENT)
Dept: FAMILY MEDICINE CLINIC | Age: 64
End: 2019-09-04
Payer: COMMERCIAL

## 2019-09-04 VITALS
TEMPERATURE: 98.2 F | HEART RATE: 65 BPM | OXYGEN SATURATION: 99 % | BODY MASS INDEX: 29.77 KG/M2 | DIASTOLIC BLOOD PRESSURE: 80 MMHG | SYSTOLIC BLOOD PRESSURE: 124 MMHG | RESPIRATION RATE: 16 BRPM | WEIGHT: 201 LBS | HEIGHT: 69 IN

## 2019-09-04 DIAGNOSIS — R73.9 HYPERGLYCEMIA: ICD-10-CM

## 2019-09-04 DIAGNOSIS — R97.20 ABNORMAL PROSTATE SPECIFIC ANTIGEN (PSA): ICD-10-CM

## 2019-09-04 DIAGNOSIS — Z11.4 ENCOUNTER FOR SCREENING FOR HIV: ICD-10-CM

## 2019-09-04 DIAGNOSIS — Z23 NEED FOR TDAP VACCINATION: ICD-10-CM

## 2019-09-04 DIAGNOSIS — R53.83 FATIGUE, UNSPECIFIED TYPE: ICD-10-CM

## 2019-09-04 DIAGNOSIS — I10 ESSENTIAL HYPERTENSION: Primary | ICD-10-CM

## 2019-09-04 DIAGNOSIS — Z11.59 NEED FOR HEPATITIS C SCREENING TEST: ICD-10-CM

## 2019-09-04 DIAGNOSIS — I10 ESSENTIAL HYPERTENSION: ICD-10-CM

## 2019-09-04 LAB
ALBUMIN SERPL-MCNC: 4.6 G/DL (ref 3.5–4.6)
ALP BLD-CCNC: 100 U/L (ref 35–104)
ALT SERPL-CCNC: 28 U/L (ref 0–41)
ANION GAP SERPL CALCULATED.3IONS-SCNC: 15 MEQ/L (ref 9–15)
AST SERPL-CCNC: 28 U/L (ref 0–40)
BASOPHILS ABSOLUTE: 0.1 K/UL (ref 0–0.2)
BASOPHILS RELATIVE PERCENT: 0.7 %
BILIRUB SERPL-MCNC: 0.5 MG/DL (ref 0.2–0.7)
BUN BLDV-MCNC: 24 MG/DL (ref 8–23)
CALCIUM SERPL-MCNC: 9.7 MG/DL (ref 8.5–9.9)
CHLORIDE BLD-SCNC: 100 MEQ/L (ref 95–107)
CO2: 26 MEQ/L (ref 20–31)
CREAT SERPL-MCNC: 0.99 MG/DL (ref 0.7–1.2)
EOSINOPHILS ABSOLUTE: 0.1 K/UL (ref 0–0.7)
EOSINOPHILS RELATIVE PERCENT: 1.4 %
GFR AFRICAN AMERICAN: >60
GFR NON-AFRICAN AMERICAN: >60
GLOBULIN: 3.1 G/DL (ref 2.3–3.5)
GLUCOSE BLD-MCNC: 75 MG/DL (ref 70–99)
HBA1C MFR BLD: 5.9 % (ref 4.8–5.9)
HCT VFR BLD CALC: 45.6 % (ref 42–52)
HEMOGLOBIN: 15 G/DL (ref 14–18)
HEPATITIS C ANTIBODY INTERPRETATION: REACTIVE
LYMPHOCYTES ABSOLUTE: 2.3 K/UL (ref 1–4.8)
LYMPHOCYTES RELATIVE PERCENT: 26.7 %
MCH RBC QN AUTO: 29.2 PG (ref 27–31.3)
MCHC RBC AUTO-ENTMCNC: 32.8 % (ref 33–37)
MCV RBC AUTO: 89 FL (ref 80–100)
MONOCYTES ABSOLUTE: 0.7 K/UL (ref 0.2–0.8)
MONOCYTES RELATIVE PERCENT: 8 %
NEUTROPHILS ABSOLUTE: 5.4 K/UL (ref 1.4–6.5)
NEUTROPHILS RELATIVE PERCENT: 63.2 %
PDW BLD-RTO: 14.9 % (ref 11.5–14.5)
PLATELET # BLD: 242 K/UL (ref 130–400)
POTASSIUM SERPL-SCNC: 4.5 MEQ/L (ref 3.4–4.9)
PROSTATE SPECIFIC ANTIGEN: 7.54 NG/ML (ref 0–5.4)
RBC # BLD: 5.13 M/UL (ref 4.7–6.1)
SODIUM BLD-SCNC: 141 MEQ/L (ref 135–144)
TOTAL PROTEIN: 7.7 G/DL (ref 6.3–8)
WBC # BLD: 8.5 K/UL (ref 4.8–10.8)

## 2019-09-04 PROCEDURE — 99214 OFFICE O/P EST MOD 30 MIN: CPT | Performed by: FAMILY MEDICINE

## 2019-09-04 PROCEDURE — 90471 IMMUNIZATION ADMIN: CPT | Performed by: FAMILY MEDICINE

## 2019-09-04 PROCEDURE — 90715 TDAP VACCINE 7 YRS/> IM: CPT | Performed by: FAMILY MEDICINE

## 2019-09-05 DIAGNOSIS — B19.20 HEPATITIS C VIRUS INFECTION WITHOUT HEPATIC COMA, UNSPECIFIED CHRONICITY: Primary | ICD-10-CM

## 2019-09-06 LAB — HIV 1,2 COMBO ANTIGEN/ANTIBODY: NEGATIVE

## 2019-10-04 DIAGNOSIS — B19.20 HEPATITIS C VIRUS INFECTION WITHOUT HEPATIC COMA, UNSPECIFIED CHRONICITY: ICD-10-CM

## 2019-10-04 LAB
HBV SURFACE AB TITR SER: NORMAL MIU/ML
HEPATITIS B SURFACE ANTIGEN INTERPRETATION: NORMAL
INR BLD: 0.9
PROTHROMBIN TIME: 12.7 SEC (ref 12.3–14.9)

## 2019-10-06 LAB — HIV 1,2 COMBO ANTIGEN/ANTIBODY: NEGATIVE

## 2019-10-07 LAB
ALANINE AMINOTRANSFERASE, FIBROMETER: 30 U/L (ref 5–50)
ALPHA-2-MACROGLOBULIN, FIBROMETER: 203 MG/DL (ref 131–293)
ASPARTATE AMINOTRANSFERASE, FIBROMETER: 26 U/L (ref 9–50)
CIRRHOMETER PATIENT SCORE: 0.08
EER FIBROMETER REPORT: ABNORMAL
FIBROMETER INTERPRETATION: ABNORMAL
FIBROMETER PATIENT SCORE: 0.46
FIBROMETER PLATELET COUNT: 234 K/UL
FIBROMETER PROTHROMBIN INDEX: 84 % (ref 90–120)
FIBROSIS METAVIR CLASSIFICATION: ABNORMAL
GAMMA GLUTAMYL TRANSFERASE, FIBROMETER: 12 U/L (ref 7–51)
INFLAMETER METAVIR CLASSIFICATION: ABNORMAL
INFLAMETER PATIENT SCORE: 0.33
UREA NITROGEN, FIBROMETER: 29 MG/DL (ref 7–20)

## 2019-10-08 LAB
AFP: 4 UG/L
HCV QNT BY NAAT IU/ML: NOT DETECTED IU/ML
HCV QNT BY NAAT LOG IU/ML: NOT DETECTED LOG IU/ML
INTERPRETATION: NOT DETECTED

## 2019-10-11 ENCOUNTER — OFFICE VISIT (OUTPATIENT)
Dept: INFECTIOUS DISEASES | Age: 64
End: 2019-10-11
Payer: COMMERCIAL

## 2019-10-11 VITALS
SYSTOLIC BLOOD PRESSURE: 118 MMHG | DIASTOLIC BLOOD PRESSURE: 81 MMHG | HEIGHT: 69 IN | WEIGHT: 205 LBS | BODY MASS INDEX: 30.36 KG/M2 | HEART RATE: 63 BPM | TEMPERATURE: 97.8 F | RESPIRATION RATE: 20 BRPM

## 2019-10-11 DIAGNOSIS — B19.20 HEPATITIS C VIRUS INFECTION WITHOUT HEPATIC COMA, UNSPECIFIED CHRONICITY: Primary | ICD-10-CM

## 2019-10-11 DIAGNOSIS — Z87.898 HISTORY OF ALCOHOL USE: ICD-10-CM

## 2019-10-11 DIAGNOSIS — F19.91 HISTORY OF RECREATIONAL DRUG USE: ICD-10-CM

## 2019-10-11 PROCEDURE — 99204 OFFICE O/P NEW MOD 45 MIN: CPT | Performed by: INTERNAL MEDICINE

## 2020-01-10 ENCOUNTER — TELEPHONE (OUTPATIENT)
Dept: FAMILY MEDICINE CLINIC | Age: 65
End: 2020-01-10

## 2020-01-10 NOTE — TELEPHONE ENCOUNTER
Pt called office requesting PSA order to have 3 month testing done. Pt aware provider out of office at time of call and ok w/ waiting for response.

## 2020-01-13 DIAGNOSIS — R97.20 ABNORMAL PROSTATE SPECIFIC ANTIGEN (PSA): ICD-10-CM

## 2020-01-13 LAB — PROSTATE SPECIFIC ANTIGEN: 7.9 NG/ML (ref 0–5.4)

## 2020-01-21 ENCOUNTER — OFFICE VISIT (OUTPATIENT)
Dept: UROLOGY | Age: 65
End: 2020-01-21
Payer: COMMERCIAL

## 2020-01-21 VITALS
WEIGHT: 211 LBS | HEIGHT: 69 IN | DIASTOLIC BLOOD PRESSURE: 80 MMHG | HEART RATE: 61 BPM | OXYGEN SATURATION: 99 % | BODY MASS INDEX: 31.25 KG/M2 | SYSTOLIC BLOOD PRESSURE: 112 MMHG

## 2020-01-21 LAB
BILIRUBIN, POC: NORMAL
BLOOD URINE, POC: NORMAL
CLARITY, POC: CLEAR
COLOR, POC: YELLOW
GLUCOSE URINE, POC: NORMAL
KETONES, POC: NORMAL
LEUKOCYTE EST, POC: NORMAL
NITRITE, POC: NORMAL
PH, POC: 7.5
POST VOID RESIDUAL (PVR): 17 ML
PROTEIN, POC: NORMAL
SPECIFIC GRAVITY, POC: 1.01
UROBILINOGEN, POC: 0.2

## 2020-01-21 PROCEDURE — 51798 US URINE CAPACITY MEASURE: CPT | Performed by: UROLOGY

## 2020-01-21 PROCEDURE — G8484 FLU IMMUNIZE NO ADMIN: HCPCS | Performed by: UROLOGY

## 2020-01-21 PROCEDURE — 4004F PT TOBACCO SCREEN RCVD TLK: CPT | Performed by: UROLOGY

## 2020-01-21 PROCEDURE — 3017F COLORECTAL CA SCREEN DOC REV: CPT | Performed by: UROLOGY

## 2020-01-21 PROCEDURE — G8417 CALC BMI ABV UP PARAM F/U: HCPCS | Performed by: UROLOGY

## 2020-01-21 PROCEDURE — 99214 OFFICE O/P EST MOD 30 MIN: CPT | Performed by: UROLOGY

## 2020-01-21 PROCEDURE — G8427 DOCREV CUR MEDS BY ELIG CLIN: HCPCS | Performed by: UROLOGY

## 2020-01-21 PROCEDURE — 81003 URINALYSIS AUTO W/O SCOPE: CPT | Performed by: UROLOGY

## 2020-01-21 RX ORDER — CHOLECALCIFEROL (VITAMIN D3) 125 MCG
5 CAPSULE ORAL NIGHTLY
Status: ON HOLD | COMMUNITY
End: 2020-09-29 | Stop reason: HOSPADM

## 2020-01-21 NOTE — PROGRESS NOTES
ATYPICAL GLANDS, SUSPICIOUS FOR NEOPLASM. PIN 4 IMMUNOSTAINING, SUPPORT ABOVE DIAGNOSIS. L.  LEFT APEX LATERAL-  FOCUS SUGGESTIVE OF  NEOPLASM, 5 %, PIN4 IMMUNOSTAINING, SUPPORTIVE OF  ABOVE DIAGNOSIS. PIN4 IMMUNOSTAINING , FAVOR ABOVE DIAGNOSIS.       Hematospermia following procedure subsiding  No further symptoms from biopsy            Urologic Review of Systems/Symptoms  Denies hematuria  Denies dysuria  Denies incontinence  Denies flank pain  Other Urologic: None recent    Review of Systems  Head and neck: No issues/reviewed  Cardiac: No recent issues/reviewed  Pulmonary: No issues/reviewed  Gastrointestinal: No issues/reviewed  Neurologic: No recent issues/reviewed  Extremities: No issues/reviewed  Lymphatics: No lymphadenopathy no change  Genitourinary: See above  Skin: No issues/reviewed  Hospitalization: No change in meds or hospitalization  All 14 categories of Review of Systems otherwise reviewed no other findings reported. Past Medical History:   Diagnosis Date    Anemia     Depression     Essential hypertension     GERD (gastroesophageal reflux disease)     Hyperlipidemia     meds > 5 yrs    Hypertension     meds > 5 yrs    Insomnia     Osteoarthritis     Osteoarthritis of spine with radiculopathy, lumbar region 1/19/2016     Past Surgical History:   Procedure Laterality Date    BACK SURGERY  2005    lumbar disc OR / fusion    BACK SURGERY  2016    fusion    CARPAL TUNNEL RELEASE Bilateral 2005    CARPAL TUNNEL RELEASE      COLONOSCOPY  3-2014-PROBABLY 08 not14    (-), 2008 POLYPS    COLONOSCOPY N/A 8/9/2019    COLORECTAL CANCER SCREENING, NOT HIGH RISK performed by Rip Ngo MD at [address] [facility], DIAGNOSTIC      JOINT REPLACEMENT  2012    RTKR    JOINT REPLACEMENT  2018    LTKR    JOINT REPLACEMENT      KNEE ARTHROSCOPY Bilateral 2014    KNEE SURGERY      LUMBAR LAMINECTOMY  2/24/16    DR. ELAM     NM TOTAL KNEE ARTHROPLASTY Left Attack Father     Arthritis Father     Heart Disease Father     Cancer Father     No Known Problems Brother     Other Brother         viral pneumonia at age 25s    No Known Problems Son      Current Outpatient Medications   Medication Sig Dispense Refill    melatonin 5 MG TABS tablet Take 5 mg by mouth daily as needed      atorvastatin (LIPITOR) 20 MG tablet TAKE ONE TABLET BY MOUTH ONE TIME A DAY 90 tablet 3    celecoxib (CELEBREX) 200 MG capsule TAKE ONE CAPSULE BY MOUTH TWICE A  capsule 3    metoprolol succinate (TOPROL XL) 100 MG extended release tablet TAKE 1 TABLET BY MOUTH ONE TIME A DAY 90 tablet 3    omeprazole (PRILOSEC) 20 MG delayed release capsule TAKE 3 CAPSULES BY MOUTH EVERY DAY FOR STOMACH 270 capsule 3    tamsulosin (FLOMAX) 0.4 MG capsule TAKE ONE CAPSULE BY MOUTH ONE TIME A DAY 90 capsule 3    fluticasone (FLONASE) 50 MCG/ACT nasal spray 1 spray by Nasal route daily 1 Bottle 3    Coenzyme Q10 (CO Q 10 PO) Take by mouth      Multiple Vitamin (MULTI VITAMIN DAILY PO) Take  by mouth 2 times daily.  magnesium oxide (MAG-OX) 400 MG tablet Take 400 mg by mouth daily.  Ascorbic Acid (VITAMIN C) 1000 MG tablet Take 1,000 mg by mouth daily.  B Complex Vitamins (VITAMIN-B COMPLEX PO) Take  by mouth 2 times daily.  Misc Natural Products (OSTEO BI-FLEX JOINT SHIELD PO) Take  by mouth daily.  Omega-3 Fatty Acids (FISH OIL) 1200 MG CAPS Take  by mouth 2 times daily.  gabapentin (NEURONTIN) 100 MG capsule Take 1 capsule by mouth 3 times daily for 30 days. (Patient not taking: Reported on 1/21/2020) 90 capsule 0     No current facility-administered medications for this visit. Patient has no known allergies.   All reviewed and verified by Dr Jung Henry on today's visit    PSA   Date Value Ref Range Status   01/13/2020 7.90 (H) 0.00 - 5.40 ng/mL Final   09/04/2019 7.54 (H) 0.00 - 5.40 ng/mL Final   02/22/2019 6.01 (H) 0.00 - 5.40 ng/mL Final   12/12/2018 5.94 (H) 0.00 - 5.40 ng/mL Final     Comment:     When the Total PSA is between 3.00 and 10.00 ng/mL, consider  requesting a Free PSA to aid in diagnosis. 09/25/2017 3.89 0.00 - 5.40 ng/mL Final     Comment:     When the Total PSA is between 3.00 and 10.00 ng/mL, consider  requesting a Free PSA to aid in diagnosis. Results for POC orders placed in visit on 01/21/20   POCT Urinalysis No Micro (Auto)   Result Value Ref Range    Color, UA yellow     Clarity, UA clear     Glucose, UA POC neg     Bilirubin, UA neg     Ketones, UA neg     Spec Grav, UA 1.015     Blood, UA POC neg     pH, UA 7.5     Protein, UA POC neg     Urobilinogen, UA 0.2     Leukocytes, UA neg     Nitrite, UA neg    poct post void residual   Result Value Ref Range    post void residual 17 ml    Narrative    A point of care test   Post Void Residual was completed by performing  ultrasound scan of the bladder and  reviewed by Dr Chito Rasheed       Physical Exam  Vitals:    01/21/20 1414   BP: 112/80   Pulse: 61   SpO2: 99%   Weight: 211 lb (95.7 kg)   Height: 5' 8.5\" (1.74 m)     Constitutional: patient is oriented to person, place, and time. patient appears well-developed. Not in distress. Ears: Adequate hearing/no hearing loss  Head: Normocephalic. Atraumatic  Neck: Normal range of motion. Cardiovascular: Normal rate, BP reviewed. Normal  Pulmonary/Chest: Normal respiratory effort Normal  Abdominal: Not distended. Normal  Urologic Exam  Residual 17 cc.  UA clear. PSA 7.9. Musculoskeletal: Normal range of motion. Normal.  Extremities: No edema  Neurological: Intact   Skin: Skin is warm and dry. No lesions. No rashes   Psychiatric: Normal affect.   Assessment/Medical Necessity-Decision Making  Prostate cancer with rising PSA after 6 months of watchful waiting  Plan  Recommend patient consider robotic prostatectomy  Referral made  Greater than 50% of 30 minutes spent consulting patient face-to-face  Orders Placed This Encounter   Procedures  POCT Urinalysis No Micro (Auto)    poct post void residual     No orders of the defined types were placed in this encounter. Nilay Saleh MD       Please note this report has been partially produced using speech recognition software  And may cause contain errors related to that system including grammar, punctuation and spelling as well as words and phrases that may seem inappropriate. If there are questions or concerns please feel free to contact me to clarify.

## 2020-01-29 ENCOUNTER — HOSPITAL ENCOUNTER (OUTPATIENT)
Dept: PHYSICAL THERAPY | Age: 65
Setting detail: THERAPIES SERIES
Discharge: HOME | End: 2020-01-29
Payer: COMMERCIAL

## 2020-01-29 PROCEDURE — 97162 PT EVAL MOD COMPLEX 30 MIN: CPT

## 2020-01-29 ASSESSMENT — PAIN DESCRIPTION - PROGRESSION: CLINICAL_PROGRESSION: GRADUALLY WORSENING

## 2020-01-29 ASSESSMENT — PAIN DESCRIPTION - ONSET: ONSET: PROGRESSIVE

## 2020-01-29 ASSESSMENT — PAIN DESCRIPTION - FREQUENCY: FREQUENCY: CONTINUOUS

## 2020-01-29 ASSESSMENT — PAIN DESCRIPTION - LOCATION: LOCATION: BACK

## 2020-01-29 ASSESSMENT — PAIN - FUNCTIONAL ASSESSMENT: PAIN_FUNCTIONAL_ASSESSMENT: PREVENTS OR INTERFERES WITH ALL ACTIVE AND SOME PASSIVE ACTIVITIES

## 2020-01-29 ASSESSMENT — PAIN DESCRIPTION - DESCRIPTORS: DESCRIPTORS: CONSTANT;ACHING;BURNING

## 2020-01-29 ASSESSMENT — PAIN DESCRIPTION - PAIN TYPE: TYPE: CHRONIC PAIN

## 2020-01-29 ASSESSMENT — PAIN DESCRIPTION - ORIENTATION: ORIENTATION: LOWER;RIGHT;LEFT

## 2020-01-29 ASSESSMENT — PAIN SCALES - GENERAL: PAINLEVEL_OUTOF10: 3

## 2020-01-29 NOTE — PROGRESS NOTES
[address], [address]     Ph: [phone]  Fax: [phone]    [] Certification  [] Recertification [x]  Plan of Care  [] Progress Note [] Discharge      To:   Aaron Carlos      From:  Barber Florence, PT, DPT  Patient: Moo Rosenberg     : 1955  Diagnosis: Lumbar radiculopathy, lumbar spondylosis      Date: 2020  Treatment Diagnosis: LBP, B foot N/T, decreased postural awareness, lumbopelvic arom, decreased B LE/core strength, decreased functional activity tolerance, (+) SUBHA/FADIR, Elys, and decreased HS flexibility     Progress Report Period from:  2020  to 2020    Total # of Visits to Date: 1   No Show: 0    Canceled Appointment: 0     OBJECTIVE:   Short Term Goals - Time Frame for Short term goals: 3 weeks (pending ins)    Goals Current/Discharge status  Met   Short term goal 1: The pt will report decreased LB pain </=5/10 in order to perform functional ADL's  /10 [] yes  [x] no   Short term goal 2: The pt will demonstrate improved upright postural awareness requiring <25% VC's with exercises  fair [] yes  [x] no     Long Term Goals - Time Frame for Long term goals : 6 weeks (pending ins)  Goals Current/ Discharge status Met   Long term goal 1: The pt will have a decrease in TINO score >/=10 points in order to increase functional activity tolerance  14/50 [] yes  [x] no   Long term goal 2: The pt will be indep/compliant with HEP in order to self manage symptoms upon D/C ongoing [] yes  [x] no   Long term goal 3: The pt will demo improved lumbar AROM >/=25% and B Hip IR, L hip ER >/=10* in order to increase ease with ADL's AROM RLE (degrees)  RLE General AROM: Hip ER 48, IR 20   AROM LLE (degrees)  LLE General AROM: Hip ER 35, IR 15     Spine  Lumbar: flex 50%, ext 25%, SB L 25% R <25% (pain with all mvmt ) [] yes  [x] no   Long term goal 4: The pt will demo improved B LE strength >/=4+/5 and core

## 2020-01-29 NOTE — PROGRESS NOTES
and some passive activities  Non-Pharmaceutical Pain Intervention(s): Heat applied;Cold applied(hot tub)     ADL Assistance: Independent  Homemaking Assistance: Independent  Homemaking Responsibilities: Yes  Ambulation Assistance: Independent  Transfer Assistance: Independent  Active : Yes  Occupation: Full time employment  Type of occupation:   Leisure & Hobbies: umpire   Additional Comments: current functional level 50-75%     Subjective:  Subjective: Pt presents with a chronic hx of LBP with progressive worsening. Has a hx of multiple back surgeries including discectomy and 2 fusions. Recent xrays 1/13/19 showed multilevel degenerative changes and transpedicular screws @ L3 appear to protrude posteriorly into IVD of L2-L3. Pain is primarily the worst first thing in the AM and decreases throughout the day. Pain is primarily in LB with constant N/T into B toes.  Was provided gabapentin however had side effects therefore stopped taking it and contacted MD.   Comments: RTD 3/25/2020    Objective:   Bed Mobility  Bridging: Modified independent     Strength RLE  Comment: 5/5 Hip IR, ER, ankle DF 4/5 hip flex, ext 4+/5 ankle DF, Hip abd, and HS (pin with all MMT)  Strength LLE  Comment: 5/5 except ankle DF 4+/5 hip flex, abd, ext 4/5 (pain with all mmt)     Strength Other  Other: fair abdominal strength with isometric abdominal testing     PROM RLE (degrees)  RLE General PROM: SLR 67  AROM RLE (degrees)  RLE General AROM: Hip ER 48, IR 20   PROM LLE (degrees)  LLE General PROM: SLR 65  AROM LLE (degrees)  LLE General AROM: Hip ER 35, IR 15    Spine  Lumbar: flex 50%, ext 25%, SB L 25% R <25% (pain with all mvmt )    Observation/Palpation  Posture: Fair  Palpation: mod tightness B lumbar paraspinals*  Observation: L lateral shift, mild forward flexed hips, flattened lumbar lordosis   Bed mobility  Bridging: Modified independent   Rolling to Left: Modified independent  Rolling to Right: Modified tolerance   Long term goal 2: The pt will be indep/compliant with HEP in order to self manage symptoms upon D/C  Long term goal 3: The pt will demo improved lumbar AROM >/=25% and B Hip IR, L hip ER >/=10* in order to increase ease with ADL's  Long term goal 4: The pt will demo improved B LE strength >/=4+/5 and core strength >/=good in order to ambulate in the AM with decreased pain/limitations     PT Individual Minutes  Time In: 1440  Time Out: 1504  Minutes: 24  Timed Code Treatment Minutes: 0 Minutes  Procedure Minutes: 24 eval    Electronically signed by Marly Jones PT on 1/29/20 at 3:14 PM

## 2020-02-03 ENCOUNTER — OFFICE VISIT (OUTPATIENT)
Dept: FAMILY MEDICINE CLINIC | Age: 65
End: 2020-02-03
Payer: COMMERCIAL

## 2020-02-03 VITALS
HEART RATE: 65 BPM | TEMPERATURE: 98.4 F | DIASTOLIC BLOOD PRESSURE: 78 MMHG | RESPIRATION RATE: 16 BRPM | WEIGHT: 211 LBS | HEIGHT: 69 IN | BODY MASS INDEX: 31.25 KG/M2 | OXYGEN SATURATION: 98 % | SYSTOLIC BLOOD PRESSURE: 118 MMHG

## 2020-02-03 PROCEDURE — 4004F PT TOBACCO SCREEN RCVD TLK: CPT | Performed by: FAMILY MEDICINE

## 2020-02-03 PROCEDURE — 99213 OFFICE O/P EST LOW 20 MIN: CPT | Performed by: FAMILY MEDICINE

## 2020-02-03 PROCEDURE — G8484 FLU IMMUNIZE NO ADMIN: HCPCS | Performed by: FAMILY MEDICINE

## 2020-02-03 PROCEDURE — G8427 DOCREV CUR MEDS BY ELIG CLIN: HCPCS | Performed by: FAMILY MEDICINE

## 2020-02-03 PROCEDURE — G8417 CALC BMI ABV UP PARAM F/U: HCPCS | Performed by: FAMILY MEDICINE

## 2020-02-03 PROCEDURE — 3017F COLORECTAL CA SCREEN DOC REV: CPT | Performed by: FAMILY MEDICINE

## 2020-02-03 RX ORDER — VARENICLINE TARTRATE 1 MG/1
1 TABLET, FILM COATED ORAL 2 TIMES DAILY
Qty: 60 TABLET | Refills: 5 | Status: SHIPPED | OUTPATIENT
Start: 2020-02-03 | End: 2020-05-01 | Stop reason: ALTCHOICE

## 2020-02-03 RX ORDER — VARENICLINE TARTRATE 0.5 MG/1
.5-1 TABLET, FILM COATED ORAL SEE ADMIN INSTRUCTIONS
Qty: 57 TABLET | Refills: 0 | Status: SHIPPED | OUTPATIENT
Start: 2020-02-03 | End: 2020-02-04

## 2020-02-03 NOTE — PROGRESS NOTES
Patient: Hany Calix    YOB: 1955    Date: 2/3/20    Chief Complaint   Patient presents with    Nicotine Dependence     He would like to discuss options to quit smoking.  Health Maintenance     He declines flu vaccine today. Patient Active Problem List    Diagnosis Date Noted    Prostate cancer ([facility] [address]     Adenomatous polyp of descending colon     Adenomatous polyp of sigmoid colon     Elevated PSA 03/07/2019    Osteoarthritis of left knee 06/18/2018    Osteoarthritis of spine with radiculopathy, lumbar region 01/19/2016    Lumbar stenosis 12/30/2015    Lumbar degenerative disc disease 12/30/2015    Laryngitis from reflux of stomach acid 08/04/2015    CHF (congestive heart failure) ([facility] [address] 02/04/2015    GERD (gastroesophageal reflux disease)     Osteoarthritis     Hyperlipidemia     Essential hypertension     Depression        No Known Allergies    Vitals:    02/03/20 1419   BP: 118/78   Site: Right Upper Arm   Position: Sitting   Cuff Size: Large Adult   Pulse: 65   Resp: 16   Temp: 98.4 °F (36.9 °C)   TempSrc: Oral   SpO2: 98%   Weight: 211 lb (95.7 kg)   Height: 5' 9\" (1.753 m)     Body mass index is 31.16 kg/m². HPI    He would like to stop smoking again and he is done well using Chantix. He has a plan he knows how to handle this and he just like to get it going already. He also wanted to discuss his recent diagnosis of prostate cancer and how we should treat it. We had about a 20-minute discussion about this. He is otherwise well    Review of Systems    Constitutional: Negative for fatigue, fever and sweats. HEENT: Negative for eye discharge and vision loss. Negative for ear drainage, hearing loss and nasal drainage. Respiratory: positive for cough, negative for dyspnea and wheezing. Cardiovascular:  Negative for chest pain, claudication and irregular heartbeat/palpitations.   Gastrointestinal: Negative for abdominal pain, nausea, vomiting, constipation and

## 2020-02-04 ENCOUNTER — TELEPHONE (OUTPATIENT)
Dept: FAMILY MEDICINE CLINIC | Age: 65
End: 2020-02-04

## 2020-02-04 RX ORDER — VARENICLINE TARTRATE 25 MG
KIT ORAL
Qty: 1 BOX | Refills: 0 | Status: SHIPPED | OUTPATIENT
Start: 2020-02-04 | End: 2021-05-25

## 2020-02-04 NOTE — TELEPHONE ENCOUNTER
Pharm called needs new rx for chantix. Just send for starter pack as directed. What was sent does not make sense.

## 2020-02-14 NOTE — PROGRESS NOTES
pain/limitations  NT secondary to unexpected D/C [] yes  [] no     Assessment: Pt failed to return after evaluation, reports that he saw MD who advised him to discontinue PT at this time. Unable to determine fucntional outcomes secondary to unexpected D/C. PLAN: D/C per pt                   Patient Status:[] Continue/ Initiate plan of Care    [x] Discharge PT. Recommend pt continue with HEP. [] Additional visits requested, Please re-certify for additional visits:        Signature: Electronically signed by Kris Gold PT on 2/14/20 at 2:08 PM    If you have any questions or concerns, please don't hesitate to call. Thank you for your referral.    I have reviewed this plan of care and certify a need for medically necessary rehabilitation services.     Physician Signature:__________________________________________________________  Date:  Please sign and return

## 2020-03-12 ENCOUNTER — APPOINTMENT (OUTPATIENT)
Dept: CT IMAGING | Age: 65
End: 2020-03-12
Payer: COMMERCIAL

## 2020-03-12 ENCOUNTER — HOSPITAL ENCOUNTER (OUTPATIENT)
Dept: MRI IMAGING | Age: 65
Discharge: HOME | End: 2020-03-14
Payer: COMMERCIAL

## 2020-03-12 PROCEDURE — 72158 MRI LUMBAR SPINE W/O & W/DYE: CPT

## 2020-03-12 PROCEDURE — 6360000004 HC RX CONTRAST MEDICATION: Performed by: NEUROLOGICAL SURGERY

## 2020-03-12 PROCEDURE — A9579 GAD-BASE MR CONTRAST NOS,1ML: HCPCS | Performed by: NEUROLOGICAL SURGERY

## 2020-03-12 RX ADMIN — GADOTERIDOL 20 ML: 279.3 INJECTION, SOLUTION INTRAVENOUS at 15:48

## 2020-04-20 ENCOUNTER — HOSPITAL ENCOUNTER (OUTPATIENT)
Dept: CT IMAGING | Age: 65
Discharge: HOME | End: 2020-04-22
Payer: COMMERCIAL

## 2020-04-20 PROCEDURE — 72131 CT LUMBAR SPINE W/O DYE: CPT

## 2020-05-05 ENCOUNTER — OFFICE VISIT (OUTPATIENT)
Dept: FAMILY MEDICINE CLINIC | Age: 65
End: 2020-05-05
Payer: COMMERCIAL

## 2020-05-05 VITALS
RESPIRATION RATE: 16 BRPM | BODY MASS INDEX: 31.84 KG/M2 | OXYGEN SATURATION: 97 % | WEIGHT: 215 LBS | DIASTOLIC BLOOD PRESSURE: 80 MMHG | HEART RATE: 75 BPM | TEMPERATURE: 98.4 F | SYSTOLIC BLOOD PRESSURE: 134 MMHG | HEIGHT: 69 IN

## 2020-05-05 PROCEDURE — G8427 DOCREV CUR MEDS BY ELIG CLIN: HCPCS | Performed by: FAMILY MEDICINE

## 2020-05-05 PROCEDURE — 1036F TOBACCO NON-USER: CPT | Performed by: FAMILY MEDICINE

## 2020-05-05 PROCEDURE — 3017F COLORECTAL CA SCREEN DOC REV: CPT | Performed by: FAMILY MEDICINE

## 2020-05-05 PROCEDURE — G8417 CALC BMI ABV UP PARAM F/U: HCPCS | Performed by: FAMILY MEDICINE

## 2020-05-05 PROCEDURE — 99213 OFFICE O/P EST LOW 20 MIN: CPT | Performed by: FAMILY MEDICINE

## 2020-05-05 NOTE — PROGRESS NOTES
Patient: Kelby Borrego    YOB: 1955    Date: 5/5/20    Chief Complaint   Patient presents with    Hypertension     3 month follow up. He had CT scan of his back done 4/20/2020.  Hyperlipidemia     3 month follow up       Patient Active Problem List    Diagnosis Date Noted    Fatty liver     Hepatitis C without hepatic coma     [facility] ([facility] [address]     Adenomatous polyp of descending colon     Adenomatous polyp of sigmoid colon     Elevated PSA 03/07/2019    Osteoarthritis of left knee 06/18/2018    Osteoarthritis of spine with radiculopathy, lumbar region 01/19/2016    Lumbar stenosis 12/30/2015    Lumbar degenerative disc disease 12/30/2015    Laryngitis from reflux of stomach acid 08/04/2015    CHF (congestive heart failure) ([facility] [address] 02/04/2015    GERD (gastroesophageal reflux disease)     Osteoarthritis     Hyperlipidemia     Essential hypertension     Depression        No Known Allergies    Vitals:    05/05/20 1633   BP: 134/80   Site: Left Upper Arm   Position: Sitting   Cuff Size: Large Adult   Pulse: 75   Resp: 16   Temp: 98.4 °F (36.9 °C)   TempSrc: Oral   SpO2: 97%   Weight: 215 lb (97.5 kg)   Height: 5' 9\" (1.753 m)     Body mass index is 31.75 kg/m². PHQ Scores 9/25/2017   PHQ2 Score 0   PHQ9 Score 0     Interpretation of Total Score Depression Severity: 1-4 = Minimal depression, 5-9 = Mild depression, 10-14 = Moderate depression, 15-19 = Moderately severe depression, 20-27 = Severe depression    HPI    Patient comes in to review findings from a CAT scan done earlier in the month when he was to have back surgery. He has a mass on his adrenal gland which needs monitoring and I decided to send him to endocrinology S clinic require making sure it does not have any active hormones as well as following it at different intervals. I think is highly unlikely that it is either active or cancer but right now is also being treated for prostate cancer.   He will have that surgery in June. Also he has a cyst on his kidney that needs better characterization with an ultrasound, and that has been ordered. Also he has a fatty liver which is a combination of him being overweight having had hepatitis C and having been alcoholic. We will deal with that when he is done with his back surgery after his prostate surgery when I see him in the fall. The patient feels well. He still working. Review of Systems    Constitutional: Negative for fatigue, fever and sweats. HEENT: Negative for eye discharge and vision loss. Negative for ear drainage, hearing loss and nasal drainage. Respiratory: Negative for cough, dyspnea and wheezing. Cardiovascular:  Negative for chest pain, claudication and irregular heartbeat/palpitations. Gastrointestinal: Negative for abdominal pain, nausea, vomiting, constipation and diarrhea. Genitourinary: No dysuria or penile discharge. Metabolic/Endocrine: Negative for cold intolerance, heat intolerance, polydipsia and polyphagia. No unintended weight loss or gain. Neuro/Psychiatric: Negative for gait disturbance. Negative for psychiatric symptoms. Dermatologic: Negative for pruritus and rash. Musculoskeletal: positive for bone/joint symptoms. No numbness or tingling. No weakness. Hematology: Negative for bleeding and easy bruising. Immunology:  Negative for environmental allergies and food allergies. Physical Exam    Patient's medication, allergies, past medical, surgical, social and family histories were reviewed and updated as appropriate. PHYSICAL EXAM   General appearance: Alert oriented pleasant cooperative no acute distress. Good color nonicteric  Lungs: Clear to auscultation without wheezes rhonchi or rales  Heart: Regular rate and rhythm without murmurs rubs or gallops  Abdomen: Obese soft nontender I really could not palpate or percuss enlarged organs. No rebound or guarding. No masses palpated.           Assessment:   Diagnosis Orders 1. Mixed hyperlipidemia  Lipid, Fasting   2. Essential hypertension  Comprehensive Metabolic Panel stable   3. Need for prophylactic vaccination and inoculation against varicella  zoster recombinant adjuvanted vaccine (SHINGRIX) 50 MCG/0.5ML SUSR injection   4. Kidney cysts  US RETROPERITONEAL LIMITED   5. Fatty liver   consider GI consult at follow-up   6. Adrenal adenoma, unspecified laterality   Per Dr. Annelise Patton:  Current Outpatient Medications   Medication Sig Dispense Refill    zoster recombinant adjuvanted vaccine [facility]) 50 MCG/0.5ML SUSR injection Inject 0.5 mLs into the muscle once for 1 dose 50 MCG IM then repeat 2-6 months. 1 each 1    oxyCODONE-acetaminophen (PERCOCET) 5-325 MG per tablet TAKE ONE TABLET BY MOUTH DAILY FOR 30 DAYS. REDUCE DOSES TAKEN AS PAIN BECOMES MANAGEABLE.  varenicline (CHANTIX STARTING MONTH CROW) 0.5 MG X 11 & 1 MG X 42 tablet As directed 1 box 0    melatonin 5 MG TABS tablet Take 5 mg by mouth daily as needed      atorvastatin (LIPITOR) 20 MG tablet TAKE ONE TABLET BY MOUTH ONE TIME A DAY 90 tablet 3    celecoxib (CELEBREX) 200 MG capsule TAKE ONE CAPSULE BY MOUTH TWICE A  capsule 3    metoprolol succinate (TOPROL XL) 100 MG extended release tablet TAKE 1 TABLET BY MOUTH ONE TIME A DAY 90 tablet 3    omeprazole (PRILOSEC) 20 MG delayed release capsule TAKE 3 CAPSULES BY MOUTH EVERY DAY FOR STOMACH 270 capsule 3    tamsulosin (FLOMAX) 0.4 MG capsule TAKE ONE CAPSULE BY MOUTH ONE TIME A DAY 90 capsule 3    fluticasone (FLONASE) 50 MCG/ACT nasal spray 1 spray by Nasal route daily 1 Bottle 3    Coenzyme Q10 (CO Q 10 PO) Take by mouth      Multiple Vitamin (MULTI VITAMIN DAILY PO) Take  by mouth 2 times daily.  magnesium oxide (MAG-OX) 400 MG tablet Take 400 mg by mouth daily.  Ascorbic Acid (VITAMIN C) 1000 MG tablet Take 1,000 mg by mouth daily.  B Complex Vitamins (VITAMIN-B COMPLEX PO) Take  by mouth 2 times daily.       Misc Natural Products (OSTEO BI-FLEX JOINT SHIELD PO) Take  by mouth daily.  Omega-3 Fatty Acids (FISH OIL) 1200 MG CAPS Take  by mouth 2 times daily. No current facility-administered medications for this visit. Orders Placed This Encounter   Procedures    US RETROPERITONEAL LIMITED     Standing Status:   Future     Standing Expiration Date:   5/5/2021    Lipid, Fasting     Standing Status:   Future     Standing Expiration Date:   5/5/2021    Comprehensive Metabolic Panel     Standing Status:   Future     Standing Expiration Date:   5/5/2021       Orders Placed This Encounter   Medications    zoster recombinant adjuvanted vaccine [facility]) 50 MCG/0.5ML SUSR injection     Sig: Inject 0.5 mLs into the muscle once for 1 dose 50 MCG IM then repeat 2-6 months. Dispense:  1 each     Refill:  1              Return in about 3 months (around 8/5/2020).     Dr. Vicenta Christensen      5/5/20  4:56 PM

## 2020-05-08 ENCOUNTER — OFFICE VISIT (OUTPATIENT)
Dept: ENDOCRINOLOGY | Age: 65
End: 2020-05-08
Payer: COMMERCIAL

## 2020-05-08 VITALS
BODY MASS INDEX: 31.84 KG/M2 | DIASTOLIC BLOOD PRESSURE: 83 MMHG | HEIGHT: 69 IN | WEIGHT: 215 LBS | SYSTOLIC BLOOD PRESSURE: 132 MMHG | HEART RATE: 67 BPM

## 2020-05-08 DIAGNOSIS — I10 ESSENTIAL HYPERTENSION: ICD-10-CM

## 2020-05-08 DIAGNOSIS — D35.01 ADENOMA OF RIGHT ADRENAL GLAND: ICD-10-CM

## 2020-05-08 DIAGNOSIS — E78.2 MIXED HYPERLIPIDEMIA: ICD-10-CM

## 2020-05-08 LAB
ALBUMIN SERPL-MCNC: 4.3 G/DL (ref 3.5–4.6)
ALP BLD-CCNC: 96 U/L (ref 35–104)
ALT SERPL-CCNC: 31 U/L (ref 0–41)
ANION GAP SERPL CALCULATED.3IONS-SCNC: 13 MEQ/L (ref 9–15)
AST SERPL-CCNC: 25 U/L (ref 0–40)
BILIRUB SERPL-MCNC: 0.6 MG/DL (ref 0.2–0.7)
BUN BLDV-MCNC: 20 MG/DL (ref 8–23)
CALCIUM SERPL-MCNC: 9.6 MG/DL (ref 8.5–9.9)
CHLORIDE BLD-SCNC: 105 MEQ/L (ref 95–107)
CHOLESTEROL, FASTING: 129 MG/DL (ref 0–199)
CO2: 24 MEQ/L (ref 20–31)
CORTISOL TOTAL: 8.5 UG/DL
CREAT SERPL-MCNC: 1.06 MG/DL (ref 0.7–1.2)
GFR AFRICAN AMERICAN: >60
GFR NON-AFRICAN AMERICAN: >60
GLOBULIN: 2.8 G/DL (ref 2.3–3.5)
GLUCOSE BLD-MCNC: 125 MG/DL (ref 70–99)
HDLC SERPL-MCNC: 50 MG/DL (ref 40–59)
LDL CHOLESTEROL CALCULATED: 68 MG/DL (ref 0–129)
POTASSIUM SERPL-SCNC: 4.8 MEQ/L (ref 3.4–4.9)
SODIUM BLD-SCNC: 142 MEQ/L (ref 135–144)
T4 FREE: 1.13 NG/DL (ref 0.84–1.68)
TOTAL PROTEIN: 7.1 G/DL (ref 6.3–8)
TRIGLYCERIDE, FASTING: 55 MG/DL (ref 0–150)
TSH SERPL DL<=0.05 MIU/L-ACNC: 1.61 UIU/ML (ref 0.44–3.86)

## 2020-05-08 PROCEDURE — G8417 CALC BMI ABV UP PARAM F/U: HCPCS | Performed by: INTERNAL MEDICINE

## 2020-05-08 PROCEDURE — G8427 DOCREV CUR MEDS BY ELIG CLIN: HCPCS | Performed by: INTERNAL MEDICINE

## 2020-05-08 PROCEDURE — 99243 OFF/OP CNSLTJ NEW/EST LOW 30: CPT | Performed by: INTERNAL MEDICINE

## 2020-05-08 ASSESSMENT — ENCOUNTER SYMPTOMS: ABDOMINAL PAIN: 0

## 2020-05-08 NOTE — PROGRESS NOTES
Subjective:      Patient ID: Merrill Norwood is a 63 y.o. male. Patient referred here for right adrenal adenoma 2.2 cm found incidentally when he was getting a CAT scan of lumbar spine for back pain patient does have hypertension but that fairly well controlled on beta-blockers Toprol denies any significant weight loss actually has gained weight because of staying at home during the pandemic denies any changes in the electrolytes chest pain palpitations dizziness headaches any bruising in his abdominal area facial plethora  Other   This is a new (Right adrenal adenoma) problem. The current episode started more than 1 month ago. Associated symptoms include fatigue. Pertinent negatives include no abdominal pain, anorexia, chest pain, diaphoresis, headaches, urinary symptoms or weakness. Nothing aggravates the symptoms. He has tried nothing for the symptoms. She does also have a history of hypothyroidism in the family thyroid function test have been checked in the past those have been normal does complain of some fatigue    Lab Results   Component Value Date     2019    K 4.5 2019     2019    CO2 26 2019    BUN 24 (H) 2019    CREATININE 0.99 2019    GLUCOSE 75 2019    CALCIUM 9.7 2019    PROT 7.7 2019    LABALBU 4.6 2019    BILITOT 0.5 2019    ALKPHOS 100 2019    AST 28 2019    ALT 28 2019    LABGLOM >60.0 2019    GFRAA >60.0 2019    GLOB 3.1 2019             Patient MRN: [id number]       : 1955       Age:  65 years       Order Date: 2020 2:52 PM.       Gender: Male       Exam: CT LUMBAR SPINE WO CONTRAST       Number of Images: [id number]       Indication:   Lumbar radiculopathy; lumbar spondylosis; pseudarthrosis of lumbar spine. Patient reports lower back pain, bilateral feet numbness and tingling.  82-year-old male with follow-up previous 2 surgeries.       Contrast dosage: None     TKA PROTOCOL, performed by Martinez Beltre MD at [address] [facility] [address] Right 2006   [place]    TOTAL KNEE ARTHROPLASTY Right 11-3-14    ULTRASOUND PROSTATE/TRANSRECTAL N/A 3/20/2019    PROSTATE TRANSRECTAL ULTRASOUND BIOPSY performed by Vibha Damian MD at [address] History   Problem Relation Age of Onset    Thyroid Disease Mother     Macular Degen Mother     Arthritis Mother     High Blood Pressure Father     Heart Attack Father     Arthritis Father     Heart Disease Father     Cancer Father     No Known Problems Brother     Other Brother         viral pneumonia at age 25s    No Known Problems Son      No Known Allergies      Current Outpatient Medications:     oxyCODONE-acetaminophen (PERCOCET) 5-325 MG per tablet, TAKE ONE TABLET BY MOUTH DAILY FOR 30 DAYS. REDUCE DOSES TAKEN AS PAIN BECOMES MANAGEABLE., Disp: , Rfl:     varenicline (CHANTIX STARTING MONTH PAK) 0.5 MG X 11 & 1 MG X 42 tablet, As directed, Disp: 1 box, Rfl: 0    melatonin 5 MG TABS tablet, Take 5 mg by mouth daily as needed, Disp: , Rfl:     atorvastatin (LIPITOR) 20 MG tablet, TAKE ONE TABLET BY MOUTH ONE TIME A DAY, Disp: 90 tablet, Rfl: 3    celecoxib (CELEBREX) 200 MG capsule, TAKE ONE CAPSULE BY MOUTH TWICE A DAY, Disp: 180 capsule, Rfl: 3    metoprolol succinate (TOPROL XL) 100 MG extended release tablet, TAKE 1 TABLET BY MOUTH ONE TIME A DAY, Disp: 90 tablet, Rfl: 3    omeprazole (PRILOSEC) 20 MG delayed release capsule, TAKE 3 CAPSULES BY MOUTH EVERY DAY FOR STOMACH, Disp: 270 capsule, Rfl: 3    tamsulosin (FLOMAX) 0.4 MG capsule, TAKE ONE CAPSULE BY MOUTH ONE TIME A DAY, Disp: 90 capsule, Rfl: 3    fluticasone (FLONASE) 50 MCG/ACT nasal spray, 1 spray by Nasal route daily, Disp: 1 Bottle, Rfl: 3    Coenzyme Q10 (CO Q 10 PO), Take by mouth, Disp: , Rfl:     Multiple Vitamin (MULTI VITAMIN DAILY PO), Take  by mouth 2 times daily. , Disp: , Mood normal.         Behavior: Behavior normal.         Assessment:       Diagnosis Orders   1.  Adenoma of right adrenal gland  Cortisol Total    Metanephrines Plasma Free    Aldosterone & Renin, Direct With Ratio    T4, Free    TSH without Reflex    Thyroid Peroxidase Antibody           Plan:      Orders Placed This Encounter   Procedures    Cortisol Total     Standing Status:   Future     Standing Expiration Date:   5/8/2021     Order Specific Question:   8AM or 4PM?     Answer:   random    Metanephrines Plasma Free     Standing Status:   Future     Standing Expiration Date:   5/8/2021    Aldosterone & Renin, Direct With Ratio     Standing Status:   Future     Standing Expiration Date:   5/8/2021    T4, Free     Standing Status:   Future     Standing Expiration Date:   5/8/2021    TSH without Reflex     Standing Status:   Future     Standing Expiration Date:   5/8/2021    Thyroid Peroxidase Antibody     Standing Status:   Future     Standing Expiration Date:   5/8/2021     Patient educated about adrenal adenoma that most of them are usually benign and nonfunctional verbalized understanding will get baseline lab work for cortisol metanephrine aldosterone also get thyroid function test thyroid antibodies    Patient to follow-up with the results over the phone in the next 4 weeks time    If the lab work is essentially normal will do another CAT scan in 6 months to see if the adrenal adenoma is getting bigger    Than 50% of 40 minutes spent in patient education counseling   thank you for the referral Dr. Radha Jaeger MD

## 2020-05-11 LAB
METANEPH/PLASMA INTERP: NORMAL
METANEPHRINE FREE PLASMA: 0.26 NMOL/L (ref 0–0.49)
NORMETANEPHRINE FREE PLASMA: 0.7 NMOL/L (ref 0–0.89)

## 2020-05-13 LAB — THYROID PEROXIDASE (TPO) ABS: 158 IU/ML (ref 0–35)

## 2020-05-22 ENCOUNTER — HOSPITAL ENCOUNTER (OUTPATIENT)
Dept: ULTRASOUND IMAGING | Age: 65
Discharge: HOME | End: 2020-05-24
Payer: COMMERCIAL

## 2020-05-22 PROCEDURE — 76775 US EXAM ABDO BACK WALL LIM: CPT

## 2020-05-26 RX ORDER — CELECOXIB 200 MG/1
CAPSULE ORAL
Qty: 180 CAPSULE | Refills: 3 | Status: ON HOLD
Start: 2020-05-26 | End: 2020-09-29 | Stop reason: HOSPADM

## 2020-05-26 RX ORDER — TAMSULOSIN HYDROCHLORIDE 0.4 MG/1
CAPSULE ORAL
Qty: 90 CAPSULE | Refills: 3 | Status: SHIPPED | OUTPATIENT
Start: 2020-05-26 | End: 2020-09-21

## 2020-05-30 LAB
ALDOSTERONE/DIRECT RENIN CALCULATION: 1.2 RATIO (ref 0.1–3.7)
ALDOSTERONE: 5.3 NG/DL
RENIN PLASMA: 4.5 PG/ML (ref 2.5–45.7)

## 2020-06-09 ENCOUNTER — VIRTUAL VISIT (OUTPATIENT)
Dept: ENDOCRINOLOGY | Age: 65
End: 2020-06-09
Payer: COMMERCIAL

## 2020-06-09 PROCEDURE — 99213 OFFICE O/P EST LOW 20 MIN: CPT | Performed by: INTERNAL MEDICINE

## 2020-06-09 PROCEDURE — 3017F COLORECTAL CA SCREEN DOC REV: CPT | Performed by: INTERNAL MEDICINE

## 2020-06-09 PROCEDURE — G8428 CUR MEDS NOT DOCUMENT: HCPCS | Performed by: INTERNAL MEDICINE

## 2020-06-09 NOTE — PROGRESS NOTES
capsule TAKE 1 CAPSULE BY MOUTH ONE TIME A DAY  Ynai Prabhakar MD   celecoxib (CELEBREX) 200 MG capsule TAKE 1 CAPSULE BY MOUTH 2 TIMES A DAY  Yani Prabhakar MD   oxyCODONE-acetaminophen (PERCOCET) 5-325 MG per tablet TAKE ONE TABLET BY MOUTH DAILY FOR 30 DAYS. REDUCE DOSES TAKEN AS PAIN BECOMES MANAGEABLE. Historical Provider, MD   varenicline (CHANTIX STARTING MONTH ) 0.5 MG X 11 & 1 MG X 42 tablet As directed  Yani Prabhakar MD   melatonin 5 MG TABS tablet Take 5 mg by mouth daily as needed  Historical Provider, MD   atorvastatin (LIPITOR) 20 MG tablet TAKE ONE TABLET BY MOUTH ONE TIME A DAY  Yani Prabhakar MD   metoprolol succinate (TOPROL XL) 100 MG extended release tablet TAKE 1 TABLET BY MOUTH ONE TIME A DAY  Yani Prabhakar MD   omeprazole (PRILOSEC) 20 MG delayed release capsule TAKE 3 CAPSULES BY MOUTH EVERY DAY FOR STOMACH  Yani Prabhakar MD   fluticasone (FLONASE) 50 MCG/ACT nasal spray 1 spray by Nasal route daily  Matilda Oreilly MD   Coenzyme Q10 (CO Q 10 PO) Take by mouth  Historical Provider, MD   Multiple Vitamin (MULTI VITAMIN DAILY PO) Take  by mouth 2 times daily. Historical Provider, MD   magnesium oxide (MAG-OX) 400 MG tablet Take 400 mg by mouth daily. Historical Provider, MD   Ascorbic Acid (VITAMIN C) 1000 MG tablet Take 1,000 mg by mouth daily. Historical Provider, MD   B Complex Vitamins (VITAMIN-B COMPLEX PO) Take  by mouth 2 times daily. Historical Provider, MD   Misc Natural Products (OSTEO BI-FLEX JOINT SHIELD PO) Take  by mouth daily. Historical Provider, MD   Omega-3 Fatty Acids (FISH OIL) 1200 MG CAPS Take  by mouth 2 times daily.   Historical Provider, MD       Social History     Tobacco Use    Smoking status: Former Smoker     Packs/day: 0.33     Years: 42.00     Pack years: 13.86     Types: Cigarettes, Cigars     Start date:      Last attempt to quit: 3/1/2020     Years since quittin.2    Smokeless tobacco: Never Used    Tobacco comment: Smokes 5 cigars a week   Substance Use Topics    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    Drug use: No            PHYSICAL EXAMINATION:  [ INSTRUCTIONS:  \"[x]\" Indicates a positive item  \"[]\" Indicates a negative item  -- DELETE ALL ITEMS NOT EXAMINED]  [] Alert  [] Oriented to person/place/time    [] No apparent distress  [] Toxic appearing    [] Face flushed appearing [] Sclera clear  [] Lips are cyanotic      [] Breathing appears normal  [] Appears tachypneic      [] Rash on visible skin    [] Cranial Nerves II-XII grossly intact    [] Motor grossly intact in visible upper extremities    [] Motor grossly intact in visible lower extremities    [] Normal Mood  [] Anxious appearing    [] Depressed appearing  [] Confused appearing      [] Poor short term memory  [] Poor long term memory    [] OTHER:      Due to this being a TeleHealth encounter, evaluation of the following organ systems is limited: Vitals/Constitutional/EENT/Resp/CV/GI//MS/Neuro/Skin/Heme-Lymph-Imm. ASSESSMENT/PLAN:     Diagnosis Orders   1. Hashimoto's thyroiditis     2. Adenoma of left adrenal gland     3. Hyperglycemia         Orders Placed This Encounter   Procedures    Basic Metabolic Panel     Standing Status:   Future     Standing Expiration Date:   6/9/2021    Hemoglobin A1C     Standing Status:   Future     Standing Expiration Date:   6/9/2021    T4, Free     Standing Status:   Future     Standing Expiration Date:   6/9/2021    TSH without Reflex     Standing Status:   Future     Standing Expiration Date:   6/9/2021     Patient to have repeat labs for A1c thyroid function test BMP in 6 months was made aware of diagnosis of Hashimoto thyroiditis no need for replacement at this time his thyroid function test normal    Might considering repeating another CAT scan in 6 to 12 months of the adrenal adenoma    Total time spent with patient was 17 minutes      An  electronic signature was used to authenticate this note.     --Sallie De Leon MD on

## 2020-07-01 RX ORDER — METOPROLOL SUCCINATE 100 MG/1
TABLET, EXTENDED RELEASE ORAL
Qty: 15 TABLET | Refills: 0 | Status: SHIPPED | OUTPATIENT
Start: 2020-07-01 | End: 2020-09-21

## 2020-07-13 RX ORDER — ATORVASTATIN CALCIUM 20 MG/1
TABLET, FILM COATED ORAL
Qty: 90 TABLET | Refills: 3 | Status: SHIPPED | OUTPATIENT
Start: 2020-07-13 | End: 2021-08-27 | Stop reason: SDUPTHER

## 2020-07-16 ENCOUNTER — PATIENT MESSAGE (OUTPATIENT)
Dept: FAMILY MEDICINE CLINIC | Age: 65
End: 2020-07-16

## 2020-07-16 NOTE — TELEPHONE ENCOUNTER
From: Joe Weller  To: Gema Jain MD  Sent: 7/16/2020 10:25 AM EDT  Subject: Non-Urgent Medical Question    Looking for help with my arthritis, my hands burn and hurt all the time, even with Celebrex, 200mg, twice a day. Need referral or your help in doing something for this condition.  Thanks

## 2020-07-30 NOTE — TELEPHONE ENCOUNTER
Talked to Shanda Cannon and he will check with [facility] and let me know where to fax the referral.

## 2020-08-11 ENCOUNTER — VIRTUAL VISIT (OUTPATIENT)
Dept: FAMILY MEDICINE CLINIC | Age: 65
End: 2020-08-11
Payer: COMMERCIAL

## 2020-08-11 PROCEDURE — 99443 PR PHYS/QHP TELEPHONE EVALUATION 21-30 MIN: CPT | Performed by: FAMILY MEDICINE

## 2020-08-11 RX ORDER — TRAZODONE HYDROCHLORIDE 50 MG/1
50 TABLET ORAL NIGHTLY PRN
Qty: 60 TABLET | Refills: 1 | Status: ON HOLD | OUTPATIENT
Start: 2020-08-11 | End: 2021-09-10

## 2020-08-11 NOTE — PROGRESS NOTES
continuity of care for an established patient. Services were provided through a phone discussion virtually to substitute for in-person clinic visit. The patient was located home and myself, the provider is located office. Patient sam cat (:  [birth year] ) has requested an audio/video evaluation for the following concern(s):     HPI:    He had his prostate removed in  of this year at the [facility] clinic robotically and was sent home after 1 day. He was catheterized at that time but the catheter is gone now. His only real issue is that he is impotent plus he has some stress incontinence. Otherwise he feels very good he looks forward to having his back surgery which will be done in September. He did go see the rheumatologist in at [facility] and I do not know that he is in a get much help from that but will await that consult report. He has found though since COVID his hit prostate cancer the neck surgery he is having more more trouble sleeping. He had tried his wife's Xanax and that helped him a great deal and he wondered if he could have some. I explained him the risks of taking Xanax and the side effects and I prefer we start with a different medicine. He is willing to do so. Review of Systems    Constitutional: Negative for fatigue, fever and sweats. HEENT: Negative for eye discharge and vision loss. Negative for ear drainage, hearing loss and nasal drainage. Respiratory: Negative for cough, dyspnea and wheezing. Cardiovascular:  Negative for chest pain, claudication and irregular heartbeat/palpitations. Gastrointestinal: Negative for abdominal pain, nausea, vomiting, constipation and diarrhea. Genitourinary: No dysuria or penile discharge. Still having some stress incontinence since his surgery he is no longer catheterized  Metabolic/Endocrine: Negative for cold intolerance, heat intolerance, polydipsia and polyphagia. No unintended weight loss or gain.   Neuro/Psychiatric: Negative for gait disturbance. Negative for psychiatric symptoms. Dermatologic: Negative for pruritus and rash. Musculoskeletal: Negative for bone/joint symptoms. No numbness or tingling. No weakness. Hematology: Negative for bleeding and easy bruising. Immunology:  Negative for environmental allergies and food allergies. Physical Exam    Not able to be done as this was a phone visit. Patient was alert, oriented, appropriate, not SOB with talking and not in distress. Assessment:   Diagnosis Orders   1. Prostate cancer ([facility] [address]     2. Essential hypertension     3. Mixed hyperlipidemia     4. Insomnia due to medical condition  traZODone (DESYREL) 50 MG tablet             Plan:  Current Outpatient Medications   Medication Sig Dispense Refill    traZODone (DESYREL) 50 MG tablet Take 1 tablet by mouth nightly as needed for Sleep 60 tablet 1    atorvastatin (LIPITOR) 20 MG tablet TAKE 1 TABLET BY MOUTH ONE TIME A DAY 90 tablet 3    metoprolol succinate (TOPROL XL) 100 MG extended release tablet TAKE 1 TABLET BY MOUTH ONE TIME A DAY 90 tablet 3    omeprazole (PRILOSEC) 20 MG delayed release capsule TAKE THREE CAPSULES BY MOUTH EVERY DAY FOR STOMACH 270 capsule 1    metoprolol succinate (TOPROL XL) 100 MG extended release tablet TAKE 1 TABLET BY MOUTH ONE TIME A DAY 15 tablet 0    tamsulosin (FLOMAX) 0.4 MG capsule TAKE 1 CAPSULE BY MOUTH ONE TIME A DAY 90 capsule 3    celecoxib (CELEBREX) 200 MG capsule TAKE 1 CAPSULE BY MOUTH 2 TIMES A  capsule 3    oxyCODONE-acetaminophen (PERCOCET) 5-325 MG per tablet TAKE ONE TABLET BY MOUTH DAILY FOR 30 DAYS. REDUCE DOSES TAKEN AS PAIN BECOMES MANAGEABLE.       varenicline (CHANTIX STARTING MONTH PAK) 0.5 MG X 11 & 1 MG X 42 tablet As directed 1 box 0    melatonin 5 MG TABS tablet Take 5 mg by mouth daily as needed      fluticasone (FLONASE) 50 MCG/ACT nasal spray 1 spray by Nasal route daily 1 Bottle 3    Coenzyme Q10 (CO Q 10 PO) Take by mouth      Multiple Vitamin (MULTI VITAMIN DAILY PO) Take  by mouth 2 times daily.  magnesium oxide (MAG-OX) 400 MG tablet Take 400 mg by mouth daily.  Ascorbic Acid (VITAMIN C) 1000 MG tablet Take 1,000 mg by mouth daily.  B Complex Vitamins (VITAMIN-B COMPLEX PO) Take  by mouth 2 times daily.  Misc Natural Products (OSTEO BI-FLEX JOINT SHIELD PO) Take  by mouth daily.  Omega-3 Fatty Acids (FISH OIL) 1200 MG CAPS Take  by mouth 2 times daily. No current facility-administered medications for this visit. No orders of the defined types were placed in this encounter. Orders Placed This Encounter   Medications    traZODone (DESYREL) 50 MG tablet     Sig: Take 1 tablet by mouth nightly as needed for Sleep     Dispense:  60 tablet     Refill:  1             Return in about 4 months (around 12/11/2020).     Dr. Stiven Mulligan      8/11/20  4:26 PM

## 2020-08-12 RX ORDER — CYCLOBENZAPRINE HCL 10 MG
10 TABLET ORAL 3 TIMES DAILY PRN
Qty: 40 TABLET | Refills: 0 | Status: SHIPPED | OUTPATIENT
Start: 2020-08-12 | End: 2020-08-25

## 2020-09-21 ENCOUNTER — HOSPITAL ENCOUNTER (OUTPATIENT)
Dept: PREADMISSION TESTING | Age: 65
Discharge: HOME | End: 2020-09-25
Payer: COMMERCIAL

## 2020-09-21 ENCOUNTER — HOSPITAL ENCOUNTER (OUTPATIENT)
Dept: GENERAL RADIOLOGY | Age: 65
Discharge: HOME | End: 2020-09-23
Payer: COMMERCIAL

## 2020-09-21 VITALS
OXYGEN SATURATION: 98 % | DIASTOLIC BLOOD PRESSURE: 86 MMHG | WEIGHT: 205.4 LBS | HEIGHT: 67 IN | BODY MASS INDEX: 32.24 KG/M2 | SYSTOLIC BLOOD PRESSURE: 148 MMHG | HEART RATE: 64 BPM | TEMPERATURE: 97.2 F | RESPIRATION RATE: 16 BRPM

## 2020-09-21 PROBLEM — S32.009K PSEUDOARTHROSIS OF LUMBAR SPINE: Status: ACTIVE | Noted: 2020-09-21

## 2020-09-21 LAB
ANION GAP SERPL CALCULATED.3IONS-SCNC: 11 MEQ/L (ref 9–15)
BUN BLDV-MCNC: 22 MG/DL (ref 8–23)
CALCIUM SERPL-MCNC: 9.5 MG/DL (ref 8.5–9.9)
CHLORIDE BLD-SCNC: 107 MEQ/L (ref 95–107)
CO2: 25 MEQ/L (ref 20–31)
CREAT SERPL-MCNC: 0.85 MG/DL (ref 0.7–1.2)
GFR AFRICAN AMERICAN: >60
GFR NON-AFRICAN AMERICAN: >60
GLUCOSE BLD-MCNC: 92 MG/DL (ref 70–99)
HCT VFR BLD CALC: 44.6 % (ref 42–52)
HEMOGLOBIN: 14.8 G/DL (ref 14–18)
INR BLD: 1
MCH RBC QN AUTO: 29 PG (ref 27–31.3)
MCHC RBC AUTO-ENTMCNC: 33.2 % (ref 33–37)
MCV RBC AUTO: 87.2 FL (ref 80–100)
PDW BLD-RTO: 13.9 % (ref 11.5–14.5)
PLATELET # BLD: 220 K/UL (ref 130–400)
POTASSIUM SERPL-SCNC: 4.8 MEQ/L (ref 3.4–4.9)
PROTHROMBIN TIME: 12.9 SEC (ref 12.3–14.9)
RBC # BLD: 5.12 M/UL (ref 4.7–6.1)
SODIUM BLD-SCNC: 143 MEQ/L (ref 135–144)
WBC # BLD: 6.4 K/UL (ref 4.8–10.8)

## 2020-09-21 PROCEDURE — 86850 RBC ANTIBODY SCREEN: CPT

## 2020-09-21 PROCEDURE — 72082 X-RAY EXAM ENTIRE SPI 2/3 VW: CPT

## 2020-09-21 PROCEDURE — 85027 COMPLETE CBC AUTOMATED: CPT

## 2020-09-21 PROCEDURE — 85610 PROTHROMBIN TIME: CPT

## 2020-09-21 PROCEDURE — 86900 BLOOD TYPING SEROLOGIC ABO: CPT

## 2020-09-21 PROCEDURE — 86901 BLOOD TYPING SEROLOGIC RH(D): CPT

## 2020-09-21 PROCEDURE — 80048 BASIC METABOLIC PNL TOTAL CA: CPT

## 2020-09-21 RX ORDER — SODIUM CHLORIDE, SODIUM LACTATE, POTASSIUM CHLORIDE, CALCIUM CHLORIDE 600; 310; 30; 20 MG/100ML; MG/100ML; MG/100ML; MG/100ML
INJECTION, SOLUTION INTRAVENOUS CONTINUOUS
Status: CANCELLED | OUTPATIENT
Start: 2020-09-28

## 2020-09-21 RX ORDER — DOCUSATE SODIUM 100 MG/1
100 CAPSULE, LIQUID FILLED ORAL 2 TIMES DAILY PRN
COMMUNITY
End: 2021-05-25

## 2020-09-21 RX ORDER — CYCLOBENZAPRINE HCL 10 MG
TABLET ORAL PRN
Status: ON HOLD | COMMUNITY
Start: 2020-02-12 | End: 2020-09-29 | Stop reason: HOSPADM

## 2020-09-21 RX ORDER — CEFAZOLIN SODIUM 2 G/50ML
2 SOLUTION INTRAVENOUS ONCE
Status: CANCELLED | OUTPATIENT
Start: 2020-09-28

## 2020-09-21 RX ORDER — OXYBUTYNIN CHLORIDE 5 MG/1
5 TABLET ORAL 3 TIMES DAILY PRN
COMMUNITY
Start: 2020-06-11 | End: 2020-10-06 | Stop reason: SDUPTHER

## 2020-09-21 RX ORDER — GENTAMICIN SULFATE 80 MG/100ML
80 INJECTION, SOLUTION INTRAVENOUS ONCE
Status: CANCELLED | OUTPATIENT
Start: 2020-09-28

## 2020-09-21 RX ORDER — SODIUM CHLORIDE 0.9 % (FLUSH) 0.9 %
10 SYRINGE (ML) INJECTION PRN
Status: CANCELLED | OUTPATIENT
Start: 2020-09-28

## 2020-09-21 RX ORDER — SODIUM CHLORIDE 0.9 % (FLUSH) 0.9 %
10 SYRINGE (ML) INJECTION EVERY 12 HOURS SCHEDULED
Status: CANCELLED | OUTPATIENT
Start: 2020-09-28

## 2020-09-21 RX ORDER — HYDROXYCHLOROQUINE SULFATE 200 MG/1
400 TABLET, FILM COATED ORAL EVERY EVENING
Status: ON HOLD | COMMUNITY
Start: 2020-09-05 | End: 2021-09-10

## 2020-09-21 RX ORDER — LIDOCAINE HYDROCHLORIDE 10 MG/ML
1 INJECTION, SOLUTION EPIDURAL; INFILTRATION; INTRACAUDAL; PERINEURAL
Status: CANCELLED | OUTPATIENT
Start: 2020-09-28 | End: 2020-09-28

## 2020-09-21 ASSESSMENT — ENCOUNTER SYMPTOMS
CONSTIPATION: 0
STRIDOR: 0
TROUBLE SWALLOWING: 0
DIARRHEA: 0
ABDOMINAL PAIN: 0
NAUSEA: 0
WHEEZING: 0
CHEST TIGHTNESS: 0
COUGH: 0
SORE THROAT: 0
VOMITING: 0
ALLERGIC/IMMUNOLOGIC NEGATIVE: 1
EYES NEGATIVE: 1
BACK PAIN: 1
SHORTNESS OF BREATH: 0

## 2020-09-21 NOTE — H&P
Nurse Practitioner History and Physical      CHIEF COMPLAINT:  Back pain    HISTORY OF PRESENT ILLNESS:      The patient is a 63 y.o. male with significant past medical history of pseudoarthrosis, lumbar spondylosis & DDD  who presents for removal hardware & re-insertion L 2-3, PLIF L 2-3, L4-2 PLIF, pedicle screws L 1 new, post lateral fusion L 1-2, L 2-3, re-exploration laminectomy L 2-3. S/p back fusions in 2005 & 2016. Recurrent low back pain radiates to both legs & numbness & tingling of both feet. Sx cause difficulty with ambulation. Scheduled for OR. Past Medical History:        Diagnosis Date    Anemia     CHF (congestive heart failure) ([facility]     Patient denies hx    Depression     Essential hypertension     Fatty liver     GERD (gastroesophageal reflux disease)     Hepatitis C without hepatic coma     Hepatitis C without hepatic coma     Hyperlipidemia     meds > 5 yrs    Hypertension     meds > 5 yrs    Insomnia     Osteoarthritis     Osteoarthritis of spine with radiculopathy, lumbar region 1/19/2016    Prostate cancer ([facility] 2019    OR -- no chemo or radiation    Prostate cancer [facility])      Past Surgical History:    Past Surgical History:   Procedure Laterality Date    BACK SURGERY  2005    lumbar disc OR / fusion    BACK SURGERY  2016    fusion    CARPAL TUNNEL RELEASE Bilateral 2005    CARPAL TUNNEL RELEASE      COLONOSCOPY  3-2014-PROBABLY 08 not14    (-), 2008 POLYPS    COLONOSCOPY N/A 8/9/2019    COLORECTAL CANCER SCREENING, NOT HIGH RISK performed by Mark Ames MD at [address] [facility], DIAGNOSTIC      JOINT REPLACEMENT  2012    RTKR    JOINT REPLACEMENT  2018    LTKR    JOINT REPLACEMENT      KNEE ARTHROSCOPY Bilateral 2014    KNEE SURGERY Left     before LTKR    LUMBAR LAMINECTOMY  2/24/16    DR. ELAM     [place] TOTAL KNEE ARTHROPLASTY Left 6/18/2018    LEFT KNEE TOTAL KNEE  ARTHROPLASTY, SUPINE, BLOCK PER TKA PROTOCOL, performed by Shaye Lin Cherelle Fisher MD at [address] [facility]  06/2020 [address] Right 2006    SHOULDER SURGERY      SPINE SURGERY      TONSILLECTOMY  1964    TOTAL KNEE ARTHROPLASTY Right 11-3-14    ULTRASOUND PROSTATE/TRANSRECTAL N/A 3/20/2019    PROSTATE TRANSRECTAL ULTRASOUND BIOPSY performed by Gee Raza MD at [facility]         Medications Prior to Admission:    Current Outpatient Medications   Medication Sig Dispense Refill    cyclobenzaprine (FLEXERIL) 10 MG tablet as needed      hydroxychloroquine (PLAQUENIL) 200 MG tablet Take 400 mg by mouth every evening       oxybutynin (DITROPAN) 5 MG tablet Take 5 mg by mouth 3 times daily as needed      docusate sodium (COLACE) 100 MG capsule Take 100 mg by mouth 2 times daily as needed for Constipation      traZODone (DESYREL) 50 MG tablet Take 1 tablet by mouth nightly as needed for Sleep 60 tablet 1    atorvastatin (LIPITOR) 20 MG tablet TAKE 1 TABLET BY MOUTH ONE TIME A DAY (Patient taking differently: Take 20 mg by mouth every evening TAKE 1 TABLET BY MOUTH ONE TIME A DAY) 90 tablet 3    metoprolol succinate (TOPROL XL) 100 MG extended release tablet TAKE 1 TABLET BY MOUTH ONE TIME A DAY 90 tablet 3    omeprazole (PRILOSEC) 20 MG delayed release capsule TAKE THREE CAPSULES BY MOUTH EVERY DAY FOR STOMACH 270 capsule 1    celecoxib (CELEBREX) 200 MG capsule TAKE 1 CAPSULE BY MOUTH 2 TIMES A  capsule 3    oxyCODONE-acetaminophen (PERCOCET) 5-325 MG per tablet Indications: usually takes at HS.  varenicline (CHANTIX STARTING MONTH PAK) 0.5 MG X 11 & 1 MG X 42 tablet As directed 1 box 0    melatonin 5 MG TABS tablet Take 5 mg by mouth nightly       fluticasone (FLONASE) 50 MCG/ACT nasal spray 1 spray by Nasal route daily 1 Bottle 3    Coenzyme Q10 (CO Q 10 PO) Take by mouth daily       Multiple Vitamin (MULTI VITAMIN DAILY PO) Take  by mouth 2 times daily.       magnesium oxide (MAG-OX) 400 MG tablet Take 400 mg by mouth daily.      Ascorbic Acid (VITAMIN C) 1000 MG tablet Take 1,000 mg by mouth daily.  B Complex Vitamins (VITAMIN-B COMPLEX PO) Take  by mouth 2 times daily.  Misc Natural Products (OSTEO BI-FLEX JOINT SHIELD PO) Take  by mouth daily.  Omega-3 Fatty Acids (FISH OIL) 1200 MG CAPS Take  by mouth 2 times daily. No current facility-administered medications for this encounter. Allergies:  Patient has no known allergies.     Social History:   Social History     Socioeconomic History    Marital status:      Spouse name: Not on file    Number of children: Not on file    Years of education: Not on file    Highest education level: Not on file   Occupational History    Not on file   Social Needs    Financial resource strain: Not on file    Food insecurity     Worry: Not on file     Inability: Not on file    Transportation needs     Medical: Not on file     Non-medical: Not on file   Tobacco Use    Smoking status: Former Smoker     Packs/day: 0.33     Years: 42.00     Pack years: 13.86     Types: Cigarettes, Cigars     Start date:      Last attempt to quit: 3/1/2020     Years since quittin.5    Smokeless tobacco: Never Used    Tobacco comment: Smokes 5 cigars a week   Substance and Sexual Activity    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    Drug use: No    Sexual activity: Yes     Partners: Female   Lifestyle    Physical activity     Days per week: Not on file     Minutes per session: Not on file    Stress: Not on file   Relationships    Social connections     Talks on phone: Not on file     Gets together: Not on file     Attends Cheondoism service: Not on file     Active member of club or organization: Not on file     Attends meetings of clubs or organizations: Not on file     Relationship status: Not on file    Intimate partner violence     Fear of current or ex partner: Not on file     Emotionally abused: Not on file     Physically abused: Not on file     Forced sexual activity: Not on file   Other Topics Concern    Not on file   Social History Narrative    Not on file       Family History:       Problem Relation Age of Onset    Thyroid Disease Mother     Macular Degen Mother     Arthritis Mother     High Blood Pressure Father     Heart Attack Father     Arthritis Father     Heart Disease Father     Cancer Father     No Known Problems Brother     Other Brother         viral pneumonia at age 25s    No Known Problems Son        Review of Systems   Constitutional: Negative. Negative for chills and fever. HENT: Negative for congestion, sore throat, tinnitus and trouble swallowing. Full upper denture. Eyes: Negative. Negative for visual disturbance. Respiratory: Negative for cough, chest tightness, shortness of breath, wheezing and stridor. Cardiovascular: Negative for chest pain and palpitations. Gastrointestinal: Negative for abdominal pain, constipation, diarrhea, nausea and vomiting. Endocrine: Negative. Genitourinary: Negative for dysuria and frequency. Musculoskeletal: Positive for back pain. Negative for myalgias and neck pain. Gait problem: radiates to both legs with numbness & tingling of both feet. Skin: Negative. Allergic/Immunologic: Negative. Neurological: Negative. Negative for seizures and headaches. Hematological: Negative. Psychiatric/Behavioral: Negative. Vitals: There were no vitals taken for this visit. Physical Exam  Constitutional:       Appearance: He is well-developed. HENT:      Head: Normocephalic and atraumatic. Right Ear: Tympanic membrane, ear canal and external ear normal.      Left Ear: Tympanic membrane, ear canal and external ear normal.      Nose: No congestion. Mouth/Throat:      Mouth: Mucous membranes are moist.      Comments: Full upper denture. Several missing lower teeth. Eyes:      General: No scleral icterus.      Extraocular Movements: Extraocular movements

## 2020-09-21 NOTE — PROGRESS NOTES
EKG done at [facility] 5/18/2020 -- paper copy on chart. Covid test to be done 9/21/2020 & will self quarantine until OR 9/28/2020. Tiera-Hex wash instructions given -- to be done Saturday 9/26/2020 & Sunday 9/27/2020.

## 2020-09-22 LAB
ABO/RH: NORMAL
ANTIBODY SCREEN: NORMAL

## 2020-09-26 ENCOUNTER — ANESTHESIA EVENT (OUTPATIENT)
Dept: OPERATING ROOM | Age: 65
DRG: 455 | End: 2020-09-26
Payer: COMMERCIAL

## 2020-09-28 ENCOUNTER — HOSPITAL ENCOUNTER (INPATIENT)
Age: 65
LOS: 1 days | Discharge: HOME | DRG: 455 | End: 2020-09-29
Attending: NEUROLOGICAL SURGERY | Admitting: NEUROLOGICAL SURGERY
Payer: COMMERCIAL

## 2020-09-28 ENCOUNTER — APPOINTMENT (OUTPATIENT)
Dept: GENERAL RADIOLOGY | Age: 65
DRG: 455 | End: 2020-09-28
Attending: NEUROLOGICAL SURGERY
Payer: COMMERCIAL

## 2020-09-28 ENCOUNTER — ANESTHESIA (OUTPATIENT)
Dept: OPERATING ROOM | Age: 65
DRG: 455 | End: 2020-09-28
Payer: COMMERCIAL

## 2020-09-28 VITALS — TEMPERATURE: 98.6 F | DIASTOLIC BLOOD PRESSURE: 89 MMHG | SYSTOLIC BLOOD PRESSURE: 144 MMHG | OXYGEN SATURATION: 100 %

## 2020-09-28 PROBLEM — M47.816 LUMBAR SPONDYLOSIS: Status: ACTIVE | Noted: 2020-09-28

## 2020-09-28 LAB
ANION GAP SERPL CALCULATED.3IONS-SCNC: 8 MEQ/L (ref 9–15)
BUN BLDV-MCNC: 18 MG/DL (ref 8–23)
CALCIUM SERPL-MCNC: 8.8 MG/DL (ref 8.5–9.9)
CHLORIDE BLD-SCNC: 105 MEQ/L (ref 95–107)
CO2: 26 MEQ/L (ref 20–31)
CREAT SERPL-MCNC: 0.88 MG/DL (ref 0.7–1.2)
GFR AFRICAN AMERICAN: >60
GFR NON-AFRICAN AMERICAN: >60
GLUCOSE BLD-MCNC: 134 MG/DL (ref 70–99)
HCT VFR BLD CALC: 39.5 % (ref 42–52)
HEMOGLOBIN: 13 G/DL (ref 14–18)
MCH RBC QN AUTO: 28.1 PG (ref 27–31.3)
MCHC RBC AUTO-ENTMCNC: 32.9 % (ref 33–37)
MCV RBC AUTO: 85.4 FL (ref 80–100)
PDW BLD-RTO: 16.2 % (ref 11.5–14.5)
PLATELET # BLD: 209 K/UL (ref 130–400)
POTASSIUM REFLEX MAGNESIUM: 4.5 MEQ/L (ref 3.4–4.9)
RBC # BLD: 4.62 M/UL (ref 4.7–6.1)
REASON FOR REJECTION: NORMAL
REJECTED TEST: NORMAL
SODIUM BLD-SCNC: 139 MEQ/L (ref 135–144)
WBC # BLD: 11.3 K/UL (ref 4.8–10.8)

## 2020-09-28 PROCEDURE — 3E0U0GB INTRODUCTION OF RECOMBINANT BONE MORPHOGENETIC PROTEIN INTO JOINTS, OPEN APPROACH: ICD-10-PCS | Performed by: NEUROLOGICAL SURGERY

## 2020-09-28 PROCEDURE — 01NB0ZZ RELEASE LUMBAR NERVE, OPEN APPROACH: ICD-10-PCS | Performed by: NEUROLOGICAL SURGERY

## 2020-09-28 PROCEDURE — 7100000000 HC PACU RECOVERY - FIRST 15 MIN: Performed by: NEUROLOGICAL SURGERY

## 2020-09-28 PROCEDURE — 2780000010 HC IMPLANT OTHER: Performed by: NEUROLOGICAL SURGERY

## 2020-09-28 PROCEDURE — 00NY0ZZ RELEASE LUMBAR SPINAL CORD, OPEN APPROACH: ICD-10-PCS | Performed by: NEUROLOGICAL SURGERY

## 2020-09-28 PROCEDURE — 0SP004Z REMOVAL OF INTERNAL FIXATION DEVICE FROM LUMBAR VERTEBRAL JOINT, OPEN APPROACH: ICD-10-PCS | Performed by: NEUROLOGICAL SURGERY

## 2020-09-28 PROCEDURE — 6360000002 HC RX W HCPCS: Performed by: NURSE PRACTITIONER

## 2020-09-28 PROCEDURE — 3209999900 FLUORO FOR SURGICAL PROCEDURES

## 2020-09-28 PROCEDURE — 2500000003 HC RX 250 WO HCPCS: Performed by: NURSE ANESTHETIST, CERTIFIED REGISTERED

## 2020-09-28 PROCEDURE — 85027 COMPLETE CBC AUTOMATED: CPT

## 2020-09-28 PROCEDURE — 2720000010 HC SURG SUPPLY STERILE: Performed by: NEUROLOGICAL SURGERY

## 2020-09-28 PROCEDURE — 2580000003 HC RX 258: Performed by: NURSE PRACTITIONER

## 2020-09-28 PROCEDURE — 3700000000 HC ANESTHESIA ATTENDED CARE: Performed by: NEUROLOGICAL SURGERY

## 2020-09-28 PROCEDURE — 94150 VITAL CAPACITY TEST: CPT

## 2020-09-28 PROCEDURE — 2500000003 HC RX 250 WO HCPCS: Performed by: NEUROLOGICAL SURGERY

## 2020-09-28 PROCEDURE — 6360000002 HC RX W HCPCS: Performed by: NURSE ANESTHETIST, CERTIFIED REGISTERED

## 2020-09-28 PROCEDURE — 6370000000 HC RX 637 (ALT 250 FOR IP): Performed by: NEUROLOGICAL SURGERY

## 2020-09-28 PROCEDURE — 6360000002 HC RX W HCPCS: Performed by: NEUROLOGICAL SURGERY

## 2020-09-28 PROCEDURE — 36415 COLL VENOUS BLD VENIPUNCTURE: CPT

## 2020-09-28 PROCEDURE — 88304 TISSUE EXAM BY PATHOLOGIST: CPT

## 2020-09-28 PROCEDURE — 7100000010 HC PHASE II RECOVERY - FIRST 15 MIN: Performed by: NEUROLOGICAL SURGERY

## 2020-09-28 PROCEDURE — 2580000003 HC RX 258: Performed by: NURSE ANESTHETIST, CERTIFIED REGISTERED

## 2020-09-28 PROCEDURE — 0QP004Z REMOVAL OF INTERNAL FIXATION DEVICE FROM LUMBAR VERTEBRA, OPEN APPROACH: ICD-10-PCS | Performed by: NEUROLOGICAL SURGERY

## 2020-09-28 PROCEDURE — 3700000001 HC ADD 15 MINUTES (ANESTHESIA): Performed by: NEUROLOGICAL SURGERY

## 2020-09-28 PROCEDURE — 2580000003 HC RX 258: Performed by: NEUROLOGICAL SURGERY

## 2020-09-28 PROCEDURE — 6360000002 HC RX W HCPCS: Performed by: ANESTHESIOLOGY

## 2020-09-28 PROCEDURE — 2709999900 HC NON-CHARGEABLE SUPPLY: Performed by: NEUROLOGICAL SURGERY

## 2020-09-28 PROCEDURE — 7100000001 HC PACU RECOVERY - ADDTL 15 MIN: Performed by: NEUROLOGICAL SURGERY

## 2020-09-28 PROCEDURE — 1210000000 HC MED SURG R&B

## 2020-09-28 PROCEDURE — 80048 BASIC METABOLIC PNL TOTAL CA: CPT

## 2020-09-28 PROCEDURE — C1713 ANCHOR/SCREW BN/BN,TIS/BN: HCPCS | Performed by: NEUROLOGICAL SURGERY

## 2020-09-28 PROCEDURE — 88311 DECALCIFY TISSUE: CPT

## 2020-09-28 PROCEDURE — 0SG10AJ FUSION OF 2 OR MORE LUMBAR VERTEBRAL JOINTS WITH INTERBODY FUSION DEVICE, POSTERIOR APPROACH, ANTERIOR COLUMN, OPEN APPROACH: ICD-10-PCS | Performed by: NEUROLOGICAL SURGERY

## 2020-09-28 PROCEDURE — 3600000014 HC SURGERY LEVEL 4 ADDTL 15MIN: Performed by: NEUROLOGICAL SURGERY

## 2020-09-28 PROCEDURE — 3600000004 HC SURGERY LEVEL 4 BASE: Performed by: NEUROLOGICAL SURGERY

## 2020-09-28 PROCEDURE — 0SB20ZZ EXCISION OF LUMBAR VERTEBRAL DISC, OPEN APPROACH: ICD-10-PCS | Performed by: NEUROLOGICAL SURGERY

## 2020-09-28 PROCEDURE — 0SG1071 FUSION OF 2 OR MORE LUMBAR VERTEBRAL JOINTS WITH AUTOLOGOUS TISSUE SUBSTITUTE, POSTERIOR APPROACH, POSTERIOR COLUMN, OPEN APPROACH: ICD-10-PCS | Performed by: NEUROLOGICAL SURGERY

## 2020-09-28 DEVICE — IMPLANTABLE DEVICE: Type: IMPLANTABLE DEVICE | Site: SPINE LUMBAR | Status: FUNCTIONAL

## 2020-09-28 DEVICE — GRAFT BNE SUB 30CC 1.7-10MM CANC CHIP MORSELIZED FRZ DRY: Type: IMPLANTABLE DEVICE | Site: SPINE LUMBAR | Status: FUNCTIONAL

## 2020-09-28 DEVICE — CONNECTOR SPNL CRSS LO PROF ADJ RELINE-O 5.5 X 45-65 MM: Type: IMPLANTABLE DEVICE | Site: SPINE LUMBAR | Status: FUNCTIONAL

## 2020-09-28 DEVICE — BONE GRAFT KIT 7510200 INFUSE SMALL
Type: IMPLANTABLE DEVICE | Site: SPINE LUMBAR | Status: FUNCTIONAL
Brand: INFUSE® BONE GRAFT

## 2020-09-28 DEVICE — SCREW SPNL DIA5.5MM OPN TULIP LOK RELINE: Type: IMPLANTABLE DEVICE | Site: SPINE LUMBAR | Status: FUNCTIONAL

## 2020-09-28 DEVICE — SCREW SPNL L55MM DIA6.5MM POST THORACOLUMBOSACRAL POLYAX 2S: Type: IMPLANTABLE DEVICE | Site: SPINE LUMBAR | Status: FUNCTIONAL

## 2020-09-28 DEVICE — GRAFT BNE SUB 15ML 1.7-10MM CANC CHIP MORSELIZED FRZ DRY: Type: IMPLANTABLE DEVICE | Site: SPINE LUMBAR | Status: FUNCTIONAL

## 2020-09-28 DEVICE — ROD SPNL LORDTC 5.5X100 MM TI RELINE-O: Type: IMPLANTABLE DEVICE | Site: SPINE LUMBAR | Status: FUNCTIONAL

## 2020-09-28 RX ORDER — PROMETHAZINE HYDROCHLORIDE 12.5 MG/1
12.5 TABLET ORAL EVERY 6 HOURS PRN
Status: DISCONTINUED | OUTPATIENT
Start: 2020-09-28 | End: 2020-09-29 | Stop reason: HOSPADM

## 2020-09-28 RX ORDER — SODIUM CHLORIDE 0.9 % (FLUSH) 0.9 %
10 SYRINGE (ML) INJECTION PRN
Status: DISCONTINUED | OUTPATIENT
Start: 2020-09-28 | End: 2020-09-28 | Stop reason: HOSPADM

## 2020-09-28 RX ORDER — DIPHENHYDRAMINE HYDROCHLORIDE 50 MG/ML
12.5 INJECTION INTRAMUSCULAR; INTRAVENOUS
Status: DISCONTINUED | OUTPATIENT
Start: 2020-09-28 | End: 2020-09-28 | Stop reason: HOSPADM

## 2020-09-28 RX ORDER — GENTAMICIN SULFATE 40 MG/ML
INJECTION, SOLUTION INTRAMUSCULAR; INTRAVENOUS PRN
Status: DISCONTINUED | OUTPATIENT
Start: 2020-09-28 | End: 2020-09-28 | Stop reason: SDUPTHER

## 2020-09-28 RX ORDER — ONDANSETRON 2 MG/ML
4 INJECTION INTRAMUSCULAR; INTRAVENOUS EVERY 6 HOURS PRN
Status: DISCONTINUED | OUTPATIENT
Start: 2020-09-28 | End: 2020-09-29 | Stop reason: HOSPADM

## 2020-09-28 RX ORDER — OMEPRAZOLE 20 MG/1
10 CAPSULE, DELAYED RELEASE ORAL EVERY MORNING
Status: DISCONTINUED | OUTPATIENT
Start: 2020-09-29 | End: 2020-09-28 | Stop reason: CLARIF

## 2020-09-28 RX ORDER — MORPHINE SULFATE 4 MG/ML
4 INJECTION, SOLUTION INTRAMUSCULAR; INTRAVENOUS
Status: DISCONTINUED | OUTPATIENT
Start: 2020-09-28 | End: 2020-09-29 | Stop reason: HOSPADM

## 2020-09-28 RX ORDER — DEXAMETHASONE SODIUM PHOSPHATE 10 MG/ML
INJECTION INTRAMUSCULAR; INTRAVENOUS PRN
Status: DISCONTINUED | OUTPATIENT
Start: 2020-09-28 | End: 2020-09-28 | Stop reason: SDUPTHER

## 2020-09-28 RX ORDER — PROPOFOL 10 MG/ML
INJECTION, EMULSION INTRAVENOUS PRN
Status: DISCONTINUED | OUTPATIENT
Start: 2020-09-28 | End: 2020-09-28 | Stop reason: SDUPTHER

## 2020-09-28 RX ORDER — SODIUM CHLORIDE 0.9 % (FLUSH) 0.9 %
10 SYRINGE (ML) INJECTION EVERY 12 HOURS SCHEDULED
Status: DISCONTINUED | OUTPATIENT
Start: 2020-09-28 | End: 2020-09-28 | Stop reason: HOSPADM

## 2020-09-28 RX ORDER — SODIUM CHLORIDE 0.9 % (FLUSH) 0.9 %
10 SYRINGE (ML) INJECTION PRN
Status: DISCONTINUED | OUTPATIENT
Start: 2020-09-28 | End: 2020-09-29 | Stop reason: HOSPADM

## 2020-09-28 RX ORDER — METOCLOPRAMIDE HYDROCHLORIDE 5 MG/ML
10 INJECTION INTRAMUSCULAR; INTRAVENOUS
Status: DISCONTINUED | OUTPATIENT
Start: 2020-09-28 | End: 2020-09-28 | Stop reason: HOSPADM

## 2020-09-28 RX ORDER — LIDOCAINE HYDROCHLORIDE 10 MG/ML
INJECTION, SOLUTION EPIDURAL; INFILTRATION; INTRACAUDAL; PERINEURAL PRN
Status: DISCONTINUED | OUTPATIENT
Start: 2020-09-28 | End: 2020-09-28 | Stop reason: SDUPTHER

## 2020-09-28 RX ORDER — MIDAZOLAM HYDROCHLORIDE 1 MG/ML
INJECTION INTRAMUSCULAR; INTRAVENOUS PRN
Status: DISCONTINUED | OUTPATIENT
Start: 2020-09-28 | End: 2020-09-28 | Stop reason: SDUPTHER

## 2020-09-28 RX ORDER — OXYBUTYNIN CHLORIDE 5 MG/1
5 TABLET ORAL 3 TIMES DAILY
Status: DISCONTINUED | OUTPATIENT
Start: 2020-09-28 | End: 2020-09-29 | Stop reason: HOSPADM

## 2020-09-28 RX ORDER — CEFAZOLIN SODIUM 2 G/50ML
2 SOLUTION INTRAVENOUS ONCE
Status: COMPLETED | OUTPATIENT
Start: 2020-09-28 | End: 2020-09-28

## 2020-09-28 RX ORDER — FENTANYL 25 UG/H
1 PATCH TRANSDERMAL
Status: DISCONTINUED | OUTPATIENT
Start: 2020-09-28 | End: 2020-09-29 | Stop reason: HOSPADM

## 2020-09-28 RX ORDER — SODIUM CHLORIDE 0.9 % (FLUSH) 0.9 %
10 SYRINGE (ML) INJECTION EVERY 12 HOURS SCHEDULED
Status: DISCONTINUED | OUTPATIENT
Start: 2020-09-28 | End: 2020-09-29 | Stop reason: HOSPADM

## 2020-09-28 RX ORDER — LANOLIN ALCOHOL/MO/W.PET/CERES
400 CREAM (GRAM) TOPICAL DAILY
Status: DISCONTINUED | OUTPATIENT
Start: 2020-09-28 | End: 2020-09-29 | Stop reason: HOSPADM

## 2020-09-28 RX ORDER — VARENICLINE TARTRATE 1 MG/1
1 TABLET, FILM COATED ORAL
Status: DISCONTINUED | OUTPATIENT
Start: 2020-09-28 | End: 2020-09-29 | Stop reason: HOSPADM

## 2020-09-28 RX ORDER — DOCUSATE SODIUM 100 MG/1
100 CAPSULE, LIQUID FILLED ORAL 2 TIMES DAILY PRN
Status: DISCONTINUED | OUTPATIENT
Start: 2020-09-28 | End: 2020-09-29 | Stop reason: HOSPADM

## 2020-09-28 RX ORDER — SODIUM CHLORIDE, SODIUM LACTATE, POTASSIUM CHLORIDE, CALCIUM CHLORIDE 600; 310; 30; 20 MG/100ML; MG/100ML; MG/100ML; MG/100ML
INJECTION, SOLUTION INTRAVENOUS CONTINUOUS
Status: DISCONTINUED | OUTPATIENT
Start: 2020-09-28 | End: 2020-09-28

## 2020-09-28 RX ORDER — HYDROCODONE BITARTRATE AND ACETAMINOPHEN 5; 325 MG/1; MG/1
1 TABLET ORAL PRN
Status: DISCONTINUED | OUTPATIENT
Start: 2020-09-28 | End: 2020-09-28 | Stop reason: HOSPADM

## 2020-09-28 RX ORDER — FENTANYL 25 UG/H
1 PATCH TRANSDERMAL
Status: DISCONTINUED | OUTPATIENT
Start: 2020-09-28 | End: 2020-09-28 | Stop reason: SDUPTHER

## 2020-09-28 RX ORDER — AMOXICILLIN 250 MG
1 CAPSULE ORAL 2 TIMES DAILY
Status: DISCONTINUED | OUTPATIENT
Start: 2020-09-28 | End: 2020-09-29 | Stop reason: HOSPADM

## 2020-09-28 RX ORDER — ROCURONIUM BROMIDE 10 MG/ML
INJECTION, SOLUTION INTRAVENOUS PRN
Status: DISCONTINUED | OUTPATIENT
Start: 2020-09-28 | End: 2020-09-28 | Stop reason: SDUPTHER

## 2020-09-28 RX ORDER — GINSENG 100 MG
CAPSULE ORAL PRN
Status: DISCONTINUED | OUTPATIENT
Start: 2020-09-28 | End: 2020-09-28 | Stop reason: ALTCHOICE

## 2020-09-28 RX ORDER — FLUTICASONE PROPIONATE 50 MCG
1 SPRAY, SUSPENSION (ML) NASAL DAILY
Status: DISCONTINUED | OUTPATIENT
Start: 2020-09-28 | End: 2020-09-29 | Stop reason: HOSPADM

## 2020-09-28 RX ORDER — GENTAMICIN SULFATE 80 MG/100ML
80 INJECTION, SOLUTION INTRAVENOUS ONCE
Status: DISCONTINUED | OUTPATIENT
Start: 2020-09-28 | End: 2020-09-28 | Stop reason: HOSPADM

## 2020-09-28 RX ORDER — ATORVASTATIN CALCIUM 20 MG/1
20 TABLET, FILM COATED ORAL EVERY EVENING
Status: DISCONTINUED | OUTPATIENT
Start: 2020-09-28 | End: 2020-09-29 | Stop reason: HOSPADM

## 2020-09-28 RX ORDER — LIDOCAINE HYDROCHLORIDE 10 MG/ML
1 INJECTION, SOLUTION EPIDURAL; INFILTRATION; INTRACAUDAL; PERINEURAL
Status: COMPLETED | OUTPATIENT
Start: 2020-09-28 | End: 2020-09-28

## 2020-09-28 RX ORDER — TRAZODONE HYDROCHLORIDE 50 MG/1
50 TABLET ORAL NIGHTLY PRN
Status: DISCONTINUED | OUTPATIENT
Start: 2020-09-28 | End: 2020-09-29 | Stop reason: HOSPADM

## 2020-09-28 RX ORDER — HYDROCODONE BITARTRATE AND ACETAMINOPHEN 5; 325 MG/1; MG/1
2 TABLET ORAL PRN
Status: DISCONTINUED | OUTPATIENT
Start: 2020-09-28 | End: 2020-09-28 | Stop reason: HOSPADM

## 2020-09-28 RX ORDER — MORPHINE SULFATE 2 MG/ML
2 INJECTION, SOLUTION INTRAMUSCULAR; INTRAVENOUS
Status: DISCONTINUED | OUTPATIENT
Start: 2020-09-28 | End: 2020-09-29 | Stop reason: HOSPADM

## 2020-09-28 RX ORDER — ONDANSETRON 2 MG/ML
4 INJECTION INTRAMUSCULAR; INTRAVENOUS
Status: DISCONTINUED | OUTPATIENT
Start: 2020-09-28 | End: 2020-09-28 | Stop reason: HOSPADM

## 2020-09-28 RX ORDER — PANTOPRAZOLE SODIUM 40 MG/1
40 TABLET, DELAYED RELEASE ORAL
Status: DISCONTINUED | OUTPATIENT
Start: 2020-09-29 | End: 2020-09-29 | Stop reason: HOSPADM

## 2020-09-28 RX ORDER — CEFAZOLIN SODIUM 2 G/50ML
2 SOLUTION INTRAVENOUS EVERY 8 HOURS
Status: COMPLETED | OUTPATIENT
Start: 2020-09-28 | End: 2020-09-29

## 2020-09-28 RX ORDER — CEPHALEXIN 500 MG/1
500 CAPSULE ORAL EVERY 8 HOURS SCHEDULED
Status: DISCONTINUED | OUTPATIENT
Start: 2020-09-29 | End: 2020-09-29 | Stop reason: HOSPADM

## 2020-09-28 RX ORDER — ONDANSETRON 2 MG/ML
INJECTION INTRAMUSCULAR; INTRAVENOUS PRN
Status: DISCONTINUED | OUTPATIENT
Start: 2020-09-28 | End: 2020-09-28 | Stop reason: SDUPTHER

## 2020-09-28 RX ORDER — OXYCODONE AND ACETAMINOPHEN 7.5; 325 MG/1; MG/1
1 TABLET ORAL EVERY 4 HOURS PRN
Status: DISCONTINUED | OUTPATIENT
Start: 2020-09-28 | End: 2020-09-29 | Stop reason: HOSPADM

## 2020-09-28 RX ORDER — MEPERIDINE HYDROCHLORIDE 25 MG/ML
12.5 INJECTION INTRAMUSCULAR; INTRAVENOUS; SUBCUTANEOUS EVERY 5 MIN PRN
Status: DISCONTINUED | OUTPATIENT
Start: 2020-09-28 | End: 2020-09-28 | Stop reason: HOSPADM

## 2020-09-28 RX ORDER — CYCLOBENZAPRINE HCL 10 MG
10 TABLET ORAL 3 TIMES DAILY
Status: DISCONTINUED | OUTPATIENT
Start: 2020-09-28 | End: 2020-09-29 | Stop reason: HOSPADM

## 2020-09-28 RX ORDER — DEXTROSE, SODIUM CHLORIDE, AND POTASSIUM CHLORIDE 5; .45; .15 G/100ML; G/100ML; G/100ML
INJECTION INTRAVENOUS CONTINUOUS
Status: DISCONTINUED | OUTPATIENT
Start: 2020-09-28 | End: 2020-09-29 | Stop reason: HOSPADM

## 2020-09-28 RX ORDER — FENTANYL CITRATE 50 UG/ML
50 INJECTION, SOLUTION INTRAMUSCULAR; INTRAVENOUS EVERY 10 MIN PRN
Status: DISCONTINUED | OUTPATIENT
Start: 2020-09-28 | End: 2020-09-28 | Stop reason: HOSPADM

## 2020-09-28 RX ORDER — FENTANYL CITRATE 50 UG/ML
INJECTION, SOLUTION INTRAMUSCULAR; INTRAVENOUS PRN
Status: DISCONTINUED | OUTPATIENT
Start: 2020-09-28 | End: 2020-09-28 | Stop reason: SDUPTHER

## 2020-09-28 RX ORDER — METOPROLOL SUCCINATE 100 MG/1
100 TABLET, EXTENDED RELEASE ORAL DAILY
Status: DISCONTINUED | OUTPATIENT
Start: 2020-09-28 | End: 2020-09-29 | Stop reason: HOSPADM

## 2020-09-28 RX ORDER — MAGNESIUM HYDROXIDE 1200 MG/15ML
LIQUID ORAL CONTINUOUS PRN
Status: COMPLETED | OUTPATIENT
Start: 2020-09-28 | End: 2020-09-28

## 2020-09-28 RX ORDER — SODIUM CHLORIDE 9 MG/ML
INJECTION, SOLUTION INTRAVENOUS CONTINUOUS PRN
Status: DISCONTINUED | OUTPATIENT
Start: 2020-09-28 | End: 2020-09-28 | Stop reason: SDUPTHER

## 2020-09-28 RX ADMIN — CEFAZOLIN SODIUM 2 G: 2 SOLUTION INTRAVENOUS at 15:30

## 2020-09-28 RX ADMIN — CYCLOBENZAPRINE 10 MG: 10 TABLET, FILM COATED ORAL at 13:06

## 2020-09-28 RX ADMIN — HYDROMORPHONE HYDROCHLORIDE 0.5 MG: 1 INJECTION, SOLUTION INTRAMUSCULAR; INTRAVENOUS; SUBCUTANEOUS at 14:14

## 2020-09-28 RX ADMIN — SODIUM CHLORIDE: 9 INJECTION, SOLUTION INTRAVENOUS at 10:37

## 2020-09-28 RX ADMIN — SUGAMMADEX 200 MG: 100 INJECTION, SOLUTION INTRAVENOUS at 11:00

## 2020-09-28 RX ADMIN — SODIUM CHLORIDE, POTASSIUM CHLORIDE, SODIUM LACTATE AND CALCIUM CHLORIDE: 600; 310; 30; 20 INJECTION, SOLUTION INTRAVENOUS at 10:07

## 2020-09-28 RX ADMIN — MIDAZOLAM HYDROCHLORIDE 2 MG: 2 INJECTION, SOLUTION INTRAMUSCULAR; INTRAVENOUS at 07:15

## 2020-09-28 RX ADMIN — TRAZODONE HYDROCHLORIDE 50 MG: 50 TABLET ORAL at 22:55

## 2020-09-28 RX ADMIN — OXYBUTYNIN CHLORIDE 5 MG: 5 TABLET ORAL at 22:55

## 2020-09-28 RX ADMIN — CEFAZOLIN SODIUM 2 G: 2 SOLUTION INTRAVENOUS at 23:51

## 2020-09-28 RX ADMIN — LIDOCAINE HYDROCHLORIDE 0.1 ML: 10 INJECTION, SOLUTION EPIDURAL; INFILTRATION; INTRACAUDAL; PERINEURAL at 06:42

## 2020-09-28 RX ADMIN — ONDANSETRON 4 MG: 2 INJECTION INTRAMUSCULAR; INTRAVENOUS at 10:09

## 2020-09-28 RX ADMIN — MORPHINE SULFATE 4 MG: 4 INJECTION, SOLUTION INTRAMUSCULAR; INTRAVENOUS at 22:59

## 2020-09-28 RX ADMIN — SODIUM CHLORIDE, POTASSIUM CHLORIDE, SODIUM LACTATE AND CALCIUM CHLORIDE 1000 ML: 600; 310; 30; 20 INJECTION, SOLUTION INTRAVENOUS at 06:44

## 2020-09-28 RX ADMIN — HYDROMORPHONE HYDROCHLORIDE 0.5 MG: 1 INJECTION, SOLUTION INTRAMUSCULAR; INTRAVENOUS; SUBCUTANEOUS at 11:57

## 2020-09-28 RX ADMIN — FLUTICASONE PROPIONATE 1 SPRAY: 50 SPRAY, METERED NASAL at 22:55

## 2020-09-28 RX ADMIN — HYDROMORPHONE HYDROCHLORIDE 0.5 MG: 1 INJECTION, SOLUTION INTRAMUSCULAR; INTRAVENOUS; SUBCUTANEOUS at 11:41

## 2020-09-28 RX ADMIN — Medication 400 MG: at 17:13

## 2020-09-28 RX ADMIN — FENTANYL CITRATE 100 MCG: 50 INJECTION, SOLUTION INTRAMUSCULAR; INTRAVENOUS at 07:27

## 2020-09-28 RX ADMIN — FENTANYL CITRATE 50 MCG: 0.05 INJECTION, SOLUTION INTRAMUSCULAR; INTRAVENOUS at 11:30

## 2020-09-28 RX ADMIN — FENTANYL CITRATE 50 MCG: 0.05 INJECTION, SOLUTION INTRAMUSCULAR; INTRAVENOUS at 11:20

## 2020-09-28 RX ADMIN — OXYCODONE HYDROCHLORIDE AND ACETAMINOPHEN 1 TABLET: 7.5; 325 TABLET ORAL at 15:09

## 2020-09-28 RX ADMIN — DEXAMETHASONE SODIUM PHOSPHATE 10 MG: 10 INJECTION INTRAMUSCULAR; INTRAVENOUS at 09:38

## 2020-09-28 RX ADMIN — OXYCODONE HYDROCHLORIDE AND ACETAMINOPHEN 1 TABLET: 7.5; 325 TABLET ORAL at 19:44

## 2020-09-28 RX ADMIN — ATORVASTATIN CALCIUM 20 MG: 20 TABLET, FILM COATED ORAL at 17:13

## 2020-09-28 RX ADMIN — HYDROMORPHONE HYDROCHLORIDE 0.5 MG: 1 INJECTION, SOLUTION INTRAMUSCULAR; INTRAVENOUS; SUBCUTANEOUS at 12:49

## 2020-09-28 RX ADMIN — PROPOFOL 200 MG: 10 INJECTION, EMULSION INTRAVENOUS at 07:27

## 2020-09-28 RX ADMIN — VARENICLINE TARTRATE 1 MG: 1 TABLET, FILM COATED ORAL at 22:55

## 2020-09-28 RX ADMIN — LIDOCAINE HYDROCHLORIDE 100 MG: 10 INJECTION, SOLUTION EPIDURAL; INFILTRATION; INTRACAUDAL; PERINEURAL at 07:27

## 2020-09-28 RX ADMIN — POTASSIUM CHLORIDE, DEXTROSE MONOHYDRATE AND SODIUM CHLORIDE: 150; 5; 450 INJECTION, SOLUTION INTRAVENOUS at 17:12

## 2020-09-28 RX ADMIN — OXYBUTYNIN CHLORIDE 5 MG: 5 TABLET ORAL at 17:13

## 2020-09-28 RX ADMIN — CEFAZOLIN SODIUM 2 G: 2 SOLUTION INTRAVENOUS at 07:19

## 2020-09-28 RX ADMIN — CYCLOBENZAPRINE 10 MG: 10 TABLET, FILM COATED ORAL at 22:55

## 2020-09-28 RX ADMIN — ROCURONIUM BROMIDE 50 MG: 10 INJECTION INTRAVENOUS at 07:27

## 2020-09-28 RX ADMIN — REMIFENTANIL HYDROCHLORIDE 0.15 MCG/KG/MIN: 1 INJECTION, POWDER, LYOPHILIZED, FOR SOLUTION INTRAVENOUS at 07:47

## 2020-09-28 RX ADMIN — SODIUM CHLORIDE, POTASSIUM CHLORIDE, SODIUM LACTATE AND CALCIUM CHLORIDE 1000 ML: 600; 310; 30; 20 INJECTION, SOLUTION INTRAVENOUS at 06:43

## 2020-09-28 RX ADMIN — MORPHINE SULFATE 4 MG: 4 INJECTION, SOLUTION INTRAMUSCULAR; INTRAVENOUS at 18:04

## 2020-09-28 RX ADMIN — GENTAMICIN SULFATE 80 MG: 40 INJECTION, SOLUTION INTRAMUSCULAR; INTRAVENOUS at 07:19

## 2020-09-28 ASSESSMENT — PULMONARY FUNCTION TESTS
PIF_VALUE: 21
PIF_VALUE: 20
PIF_VALUE: 21
PIF_VALUE: 20
PIF_VALUE: 19
PIF_VALUE: 22
PIF_VALUE: 20
PIF_VALUE: 24
PIF_VALUE: 20
PIF_VALUE: 20
PIF_VALUE: 21
PIF_VALUE: 21
PIF_VALUE: 20
PIF_VALUE: 19
PIF_VALUE: 21
PIF_VALUE: 21
PIF_VALUE: 22
PIF_VALUE: 23
PIF_VALUE: 21
PIF_VALUE: 17
PIF_VALUE: 22
PIF_VALUE: 19
PIF_VALUE: 21
PIF_VALUE: 20
PIF_VALUE: 2
PIF_VALUE: 21
PIF_VALUE: 22
PIF_VALUE: 20
PIF_VALUE: 22
PIF_VALUE: 21
PIF_VALUE: 21
PIF_VALUE: 20
PIF_VALUE: 20
PIF_VALUE: 22
PIF_VALUE: 20
PIF_VALUE: 21
PIF_VALUE: 21
PIF_VALUE: 20
PIF_VALUE: 22
PIF_VALUE: 20
PIF_VALUE: 21
PIF_VALUE: 22
PIF_VALUE: 23
PIF_VALUE: 23
PIF_VALUE: 5
PIF_VALUE: 22
PIF_VALUE: 21
PIF_VALUE: 20
PIF_VALUE: 1
PIF_VALUE: 21
PIF_VALUE: 20
PIF_VALUE: 21
PIF_VALUE: 23
PIF_VALUE: 21
PIF_VALUE: 22
PIF_VALUE: 22
PIF_VALUE: 21
PIF_VALUE: 18
PIF_VALUE: 25
PIF_VALUE: 21
PIF_VALUE: 20
PIF_VALUE: 21
PIF_VALUE: 21
PIF_VALUE: 22
PIF_VALUE: 20
PIF_VALUE: 21
PIF_VALUE: 22
PIF_VALUE: 20
PIF_VALUE: 21
PIF_VALUE: 23
PIF_VALUE: 21
PIF_VALUE: 21
PIF_VALUE: 23
PIF_VALUE: 2
PIF_VALUE: 23
PIF_VALUE: 22
PIF_VALUE: 21
PIF_VALUE: 7
PIF_VALUE: 22
PIF_VALUE: 21
PIF_VALUE: 20
PIF_VALUE: 22
PIF_VALUE: 22
PIF_VALUE: 20
PIF_VALUE: 20
PIF_VALUE: 22
PIF_VALUE: 21
PIF_VALUE: 22
PIF_VALUE: 21
PIF_VALUE: 20
PIF_VALUE: 20
PIF_VALUE: 22
PIF_VALUE: 21
PIF_VALUE: 21
PIF_VALUE: 20
PIF_VALUE: 21
PIF_VALUE: 21
PIF_VALUE: 22
PIF_VALUE: 21
PIF_VALUE: 22
PIF_VALUE: 20
PIF_VALUE: 23
PIF_VALUE: 22
PIF_VALUE: 23
PIF_VALUE: 21
PIF_VALUE: 20
PIF_VALUE: 22
PIF_VALUE: 4
PIF_VALUE: 21
PIF_VALUE: 20
PIF_VALUE: 21
PIF_VALUE: 20
PIF_VALUE: 8
PIF_VALUE: 20
PIF_VALUE: 22
PIF_VALUE: 20
PIF_VALUE: 21
PIF_VALUE: 26
PIF_VALUE: 22
PIF_VALUE: 21
PIF_VALUE: 22
PIF_VALUE: 21
PIF_VALUE: 20
PIF_VALUE: 3
PIF_VALUE: 23
PIF_VALUE: 20
PIF_VALUE: 20
PIF_VALUE: 21
PIF_VALUE: 18
PIF_VALUE: 21
PIF_VALUE: 22
PIF_VALUE: 22
PIF_VALUE: 21
PIF_VALUE: 22
PIF_VALUE: 21
PIF_VALUE: 30
PIF_VALUE: 22
PIF_VALUE: 18
PIF_VALUE: 20
PIF_VALUE: 20
PIF_VALUE: 23
PIF_VALUE: 20
PIF_VALUE: 21
PIF_VALUE: 22
PIF_VALUE: 21
PIF_VALUE: 20
PIF_VALUE: 23
PIF_VALUE: 21
PIF_VALUE: 20
PIF_VALUE: 22
PIF_VALUE: 20
PIF_VALUE: 20
PIF_VALUE: 21
PIF_VALUE: 21
PIF_VALUE: 20
PIF_VALUE: 22
PIF_VALUE: 22
PIF_VALUE: 20
PIF_VALUE: 20
PIF_VALUE: 21
PIF_VALUE: 18
PIF_VALUE: 21
PIF_VALUE: 20
PIF_VALUE: 20
PIF_VALUE: 22
PIF_VALUE: 22
PIF_VALUE: 21
PIF_VALUE: 20
PIF_VALUE: 21
PIF_VALUE: 20
PIF_VALUE: 20
PIF_VALUE: 22
PIF_VALUE: 21
PIF_VALUE: 20
PIF_VALUE: 21
PIF_VALUE: 20
PIF_VALUE: 21
PIF_VALUE: 20
PIF_VALUE: 21
PIF_VALUE: 20
PIF_VALUE: 21
PIF_VALUE: 21
PIF_VALUE: 19
PIF_VALUE: 21
PIF_VALUE: 20
PIF_VALUE: 20
PIF_VALUE: 21
PIF_VALUE: 22
PIF_VALUE: 0
PIF_VALUE: 20
PIF_VALUE: 21
PIF_VALUE: 20
PIF_VALUE: 23
PIF_VALUE: 20
PIF_VALUE: 21
PIF_VALUE: 20
PIF_VALUE: 19
PIF_VALUE: 20
PIF_VALUE: 22

## 2020-09-28 ASSESSMENT — PAIN SCALES - GENERAL
PAINLEVEL_OUTOF10: 7
PAINLEVEL_OUTOF10: 8
PAINLEVEL_OUTOF10: 6
PAINLEVEL_OUTOF10: 5
PAINLEVEL_OUTOF10: 7
PAINLEVEL_OUTOF10: 6
PAINLEVEL_OUTOF10: 3
PAINLEVEL_OUTOF10: 6
PAINLEVEL_OUTOF10: 6
PAINLEVEL_OUTOF10: 5
PAINLEVEL_OUTOF10: 8
PAINLEVEL_OUTOF10: 9
PAINLEVEL_OUTOF10: 8
PAINLEVEL_OUTOF10: 9
PAINLEVEL_OUTOF10: 5
PAINLEVEL_OUTOF10: 6
PAINLEVEL_OUTOF10: 5

## 2020-09-28 ASSESSMENT — PAIN DESCRIPTION - ORIENTATION: ORIENTATION: LOWER;MID

## 2020-09-28 ASSESSMENT — PAIN DESCRIPTION - PAIN TYPE: TYPE: SURGICAL PAIN

## 2020-09-28 ASSESSMENT — PAIN DESCRIPTION - LOCATION: LOCATION: BACK

## 2020-09-28 NOTE — ANESTHESIA PRE PROCEDURE
Department of Anesthesiology  Preprocedure Note       Name:  Torrey Knight   Age:  63 y.o.  :  1955                                          MRN:  [id number]         Date:  2020      Surgeon: Kate Davis):  Germania Huang MD    Procedure: Procedure(s):  REMOVAL OF HARDWARE AND RE-INSERTION L2-3; PLIF L2-3, L1-2 (POSTERIOR LUMBAR INTERBODY FUSION); PEDICLE SCREWS L1 NEW; POSTLATERAL FUSION L1-2, L2-3, RE-EXPLORATION LAMINECTOMY L2-3. 4 HOURS, 1 C-ARM, ISACC TABLE, NUVASIVE, SSEP, CELL SAVERS. REQUESTED FIRST CASE    Medications prior to admission:   Prior to Admission medications    Medication Sig Start Date End Date Taking?  Authorizing Provider   cyclobenzaprine (FLEXERIL) 10 MG tablet as needed 20  Yes Historical Provider, MD   hydroxychloroquine (PLAQUENIL) 200 MG tablet Take 400 mg by mouth every evening  20  Yes Historical Provider, MD   oxybutynin (DITROPAN) 5 MG tablet Take 5 mg by mouth 3 times daily as needed 20  Yes Historical Provider, MD   traZODone (DESYREL) 50 MG tablet Take 1 tablet by mouth nightly as needed for Sleep 20  Yes Moreno Land MD   atorvastatin (LIPITOR) 20 MG tablet TAKE 1 TABLET BY MOUTH ONE TIME A DAY  Patient taking differently: Take 20 mg by mouth every evening TAKE 1 TABLET BY MOUTH ONE TIME A DAY 20  Yes Moreno Land MD   metoprolol succinate (TOPROL XL) 100 MG extended release tablet TAKE 1 TABLET BY MOUTH ONE TIME A DAY 20  Yes Moreno Land MD   omeprazole (PRILOSEC) 20 MG delayed release capsule TAKE THREE CAPSULES BY MOUTH EVERY DAY FOR STOMACH 20  Yes oMreno Land MD   celecoxib (CELEBREX) 200 MG capsule TAKE 1 CAPSULE BY MOUTH 2 TIMES A DAY 20  Yes Moreno Land MD   oxyCODONE-acetaminophen (PERCOCET) 5-325 MG per tablet Indications: usually takes at HS.  20  Yes Historical Provider, MD   varenicline (CHANTIX STARTING MONTH ) 0.5 MG X 11 & 1 MG X 42 tablet As directed 20  Yes Moreno Land MD   melatonin 5 MG TABS tablet Take 5 mg by mouth nightly    Yes Historical Provider, MD   fluticasone (FLONASE) 50 MCG/ACT nasal spray 1 spray by Nasal route daily 12/12/18  Yes General Kacie MD   Coenzyme Q10 (CO Q 10 PO) Take by mouth daily    Yes Historical Provider, MD   Multiple Vitamin (MULTI VITAMIN DAILY PO) Take  by mouth 2 times daily. Yes Historical Provider, MD   magnesium oxide (MAG-OX) 400 MG tablet Take 400 mg by mouth daily. Yes Historical Provider, MD   Ascorbic Acid (VITAMIN C) 1000 MG tablet Take 1,000 mg by mouth daily. Yes Historical Provider, MD   B Complex Vitamins (VITAMIN-B COMPLEX PO) Take  by mouth 2 times daily. Yes Historical Provider, MD   Misc Natural Products (OSTEO BI-FLEX JOINT SHIELD PO) Take  by mouth daily. Yes Historical Provider, MD   Omega-3 Fatty Acids (FISH OIL) 1200 MG CAPS Take  by mouth 2 times daily.    Yes Historical Provider, MD   docusate sodium (COLACE) 100 MG capsule Take 100 mg by mouth 2 times daily as needed for Constipation    Historical Provider, MD       Current medications:    Current Facility-Administered Medications   Medication Dose Route Frequency Provider Last Rate Last Dose    lactated ringers infusion   Intravenous Continuous DAYNA Silva  mL/hr at 09/28/20 0643 1,000 mL at 09/28/20 0322    sodium chloride flush 0.9 % injection 10 mL  10 mL Intravenous 2 times per day DAYNA Silva - CNP        sodium chloride flush 0.9 % injection 10 mL  10 mL Intravenous PRN DAYNA Silva CNP        ceFAZolin (ANCEF) 2 g in dextrose 3 % 50 mL IVPB (duplex)  2 g Intravenous Once DAYNA Silva CNP        gentamicin (GARAMYCIN) IVPB 80 mg  80 mg Intravenous Once DAYNA Silva CNP        lactated ringers infusion   Intravenous Continuous DAYNA Silva CNP 50 mL/hr at 09/28/20 0644 1,000 mL at 09/28/20 0644    fentaNYL (SUBLIMAZE) injection 50 mcg  50 mcg Intravenous Q10 Min PRN Sergio Carmona MD        HYDROmorphone (DILAUDID) injection 0.5 mg  0.5 mg Intravenous Q10 Min PRN Sergio Carmona MD        HYDROcodone-acetaminophen [facility]) 5-325 MG per tablet 1 tablet  1 tablet Oral PRN Sergio Carmona MD        Or    HYDROcodone-acetaminophen [facility]) 5-325 MG per tablet 2 tablet  2 tablet Oral PRN Sergio Carmona MD        diphenhydrAMINE (BENADRYL) injection 12.5 mg  12.5 mg Intravenous Once PRN Sergio Carmona MD        ondansetron [facility]) injection 4 mg  4 mg Intravenous Once PRN Sergio Carmona MD        metoclopramide [facility]) injection 10 mg  10 mg Intravenous Once PRN Sergio Carmona MD        meperidine (DEMEROL) injection 12.5 mg  12.5 mg Intravenous Q5 Min PRN Sergio Carmona MD           Allergies:  No Known Allergies    Problem List:    Patient Active Problem List   Diagnosis Code    Hyperlipidemia E78.5    Essential hypertension I10    Depression F32.9    GERD (gastroesophageal reflux disease) K21.9    Osteoarthritis M19.90    CHF (congestive heart failure) (HCC) I50.9    Laryngitis from reflux of stomach acid J04.0, K21.9    Lumbar stenosis M48.061    Lumbar degenerative disc disease M51.36    Osteoarthritis of spine with radiculopathy, lumbar region M47.26    Osteoarthritis of left knee M17.12    Elevated PSA R97.20    Adenomatous polyp of descending colon D12.4    Adenomatous polyp of sigmoid colon D12.5    Prostate cancer ([facility] C61    Hepatitis C without hepatic coma B19.20    Fatty liver K76.0    Pseudoarthrosis of lumbar spine S32.009K       Past Medical History:        Diagnosis Date    Anemia     CHF (congestive heart failure) ([address]     Patient denies hx    Depression     Essential hypertension     Fatty liver     GERD (gastroesophageal reflux disease)     Hepatitis C without hepatic coma     Hepatitis C without hepatic coma     Hyperlipidemia     meds > 5 yrs    Hypertension meds > 5 yrs    Insomnia     Osteoarthritis     Osteoarthritis of spine with radiculopathy, lumbar region 2016    Prostate cancer ([facility] 2019    OR -- no chemo or radiation    Prostate cancer [facility])        Past Surgical History:        Procedure Laterality Date    BACK SURGERY  2005    lumbar disc OR / fusion    BACK SURGERY  2016    fusion    CARPAL TUNNEL RELEASE Bilateral 2005    CARPAL TUNNEL RELEASE      COLONOSCOPY  3--PROBABLY 08 not14    (-), 2008 POLYPS    COLONOSCOPY N/A 2019    COLORECTAL CANCER SCREENING, NOT HIGH RISK performed by Andre Robles MD at [address] [facility], COLON, DIAGNOSTIC      JOINT REPLACEMENT      RTKR    JOINT REPLACEMENT  2018    LTKR    JOINT REPLACEMENT      KNEE ARTHROSCOPY Bilateral     KNEE SURGERY Left     before LTKR    LUMBAR LAMINECTOMY  16    DR. Li 78     [place] TOTAL KNEE ARTHROPLASTY Left 2018    LEFT KNEE TOTAL KNEE  ARTHROPLASTY, SUPINE, BLOCK PER TKA PROTOCOL, performed by Alfred Hunt MD at [address]  2020    [facility]    ROTATOR CUFF REPAIR Right     [address]    TOTAL KNEE ARTHROPLASTY Right 11-3-14    ULTRASOUND PROSTATE/TRANSRECTAL N/A 3/20/2019    PROSTATE TRANSRECTAL ULTRASOUND BIOPSY performed by Jose Alejandro Copeland MD at [facility] History:    Social History     Tobacco Use    Smoking status: Former Smoker     Packs/day: 0.33     Years: 42.00     Pack years: 13.86     Types: Cigarettes, Cigars     Start date:      Last attempt to quit: 3/1/2020     Years since quittin.5    Smokeless tobacco: Never Used    Tobacco comment: Smokes 5 cigars a week   Substance Use Topics    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                                Counseling given: Not Answered  Comment: Smokes 5 cigars a week      Vital Signs (Current):   Vitals:    20 0609   BP: 127/85   Pulse: 80 Resp: 20   Temp: 97.6 °F (36.4 °C)   TempSrc: Temporal   SpO2: 96%   Weight: 205 lb (93 kg)   Height: 5' 6.5\" (1.689 m)                                              BP Readings from Last 3 Encounters:   09/28/20 127/85   09/21/20 (!) 148/86   05/08/20 132/83       NPO Status: Time of last liquid consumption: 0000                        Time of last solid consumption: 0000                        Date of last liquid consumption: 09/28/20                        Date of last solid food consumption: 09/28/20    BMI:   Wt Readings from Last 3 Encounters:   09/28/20 205 lb (93 kg)   09/21/20 205 lb 6.4 oz (93.2 kg)   08/26/20 206 lb (93.4 kg)     Body mass index is 32.59 kg/m². CBC:   Lab Results   Component Value Date    WBC 6.4 09/21/2020    RBC 5.12 09/21/2020    HGB 14.8 09/21/2020    HCT 44.6 09/21/2020    MCV 87.2 09/21/2020    RDW 13.9 09/21/2020     09/21/2020       CMP:   Lab Results   Component Value Date     09/21/2020    K 4.8 09/21/2020    K 4.3 06/19/2018     09/21/2020    CO2 25 09/21/2020    BUN 22 09/21/2020    CREATININE 0.85 09/21/2020    GFRAA >60.0 09/21/2020    LABGLOM >60.0 09/21/2020    GLUCOSE 92 09/21/2020    PROT 7.1 05/08/2020    CALCIUM 9.5 09/21/2020    BILITOT 0.6 05/08/2020    ALKPHOS 96 05/08/2020    AST 25 05/08/2020    ALT 31 05/08/2020       POC Tests: No results for input(s): POCGLU, POCNA, POCK, POCCL, POCBUN, POCHEMO, POCHCT in the last 72 hours.     Coags:   Lab Results   Component Value Date    PROTIME 12.9 09/21/2020    INR 1.0 09/21/2020    APTT 27.1 05/21/2018       HCG (If Applicable): No results found for: PREGTESTUR, PREGSERUM, HCG, HCGQUANT     ABGs: No results found for: PHART, PO2ART, DWM0YOZ, POH6MGD, BEART, N2OHONOH     Type & Screen (If Applicable):  No results found for: LABABO, LABRH    Drug/Infectious Status (If Applicable):  No results found for: HIV, HEPCAB    COVID-19 Screening (If Applicable):   Lab Results   Component Value Date    COVID19 Not Detected 09/21/2020         Anesthesia Evaluation  Patient summary reviewed and Nursing notes reviewed no history of anesthetic complications:   Airway: Mallampati: II  TM distance: >3 FB   Neck ROM: full  Mouth opening: > = 3 FB Dental: normal exam         Pulmonary:Negative Pulmonary ROS and normal exam                               Cardiovascular:    (+) hypertension:, CHF:,       ECG reviewed                        Neuro/Psych:   (+) neuromuscular disease:,             GI/Hepatic/Renal:   (+) GERD:, hepatitis:, liver disease:,           Endo/Other:                     Abdominal:           Vascular: negative vascular ROS. Anesthesia Plan      general     ASA 4       Induction: intravenous. Anesthetic plan and risks discussed with patient. Plan discussed with CRNA.     Attending anesthesiologist reviewed and agrees with Pre Eval content              Matt Teixeira MD   9/28/2020

## 2020-09-28 NOTE — ANESTHESIA POSTPROCEDURE EVALUATION
Department of Anesthesiology  Postprocedure Note    Patient: Anand Jason  MRN: [id number]  YOB: 1955  Date of evaluation: 9/28/2020  Time:  11:21 AM     Procedure Summary     Date:  09/28/20 Room / Location:  [address]    Anesthesia Start:  8238 Anesthesia Stop:      Procedure:  REMOVAL OF HARDWARE AT L3-4 L2,L3,L4 INSTRUMENTATION L2-3 PLIF L3-4 POSTEROLATERAL FUSION (N/A ) Diagnosis:  (PSEUDOARTHROSIS, RADICULOPATHY, SPONDYLOSIS, DEGENERATIVE DISC DISEASE)    Surgeon:  Kasandra Brooke MD Responsible Provider:  Liana Gilford, APRN - CRNA    Anesthesia Type:  general ASA Status:  4          Anesthesia Type: general    Jeannine Phase I: Jeannine Score: 10    Jeannine Phase II:      Last vitals: Reviewed and per EMR flowsheets.        Anesthesia Post Evaluation    Patient location during evaluation: PACU  Patient participation: complete - patient participated  Level of consciousness: awake and alert  Pain score: 0  Airway patency: patent  Nausea & Vomiting: no nausea and no vomiting  Complications: no  Cardiovascular status: blood pressure returned to baseline and hemodynamically stable  Respiratory status: acceptable  Hydration status: euvolemic

## 2020-09-28 NOTE — H&P
Subjective:      Isha Tee is 63 y.o. male who complains today of:         Chief Complaint   Patient presents with    Back Pain      He is here for follow up to back pain. Feels worse compared to last visit. Previous physical therapy. [age]-year-old male with a history of three back surgeries with last one being a PLIF 2-24-16 by Dr. Jose Olmos now here with exacerbation of pain over the last year without injury or trauma. He complains of lower back pain and bilateral feet numbness and tingling affecting all digits. Denies exacerbation with coughing or sneezing. Pain is worse in the morning and feels better when he moves. He reports subjective feet weakness only when he is fatigued.       Denies recent fever or chills. No weight gain or weight loss. No bladder or bowel incontinence. Denies diabetes. He just returned to smoking. He reports his prostate cancer surgery has been 95% contained.      Narcotics: Percocet prn and Flexeril   Conservative treatments: physical therapy       Work status: Off work from prostate issues  at [employer] in [facility], scheduled to return to work July 6th.         Back Pain   Pertinent negatives include no fever or headaches.         Allergies:  Patient has no known allergies.     Past Medical History   Past Medical History:   Diagnosis Date    Anemia      Depression      Essential hypertension      Fatty liver      GERD (gastroesophageal reflux disease)      Hepatitis C without hepatic coma      Hepatitis C without hepatic coma      Hyperlipidemia       meds > 5 yrs    Hypertension       meds > 5 yrs    Insomnia      Osteoarthritis      Osteoarthritis of spine with radiculopathy, lumbar region 1/19/2016    Prostate cancer ([facility] [address]      Prostate cancer [facility])          Past Surgical History         Past Surgical History:   Procedure Laterality Date    BACK SURGERY   2005     lumbar disc OR / fusion    BACK SURGERY   2016     fusion  CARPAL TUNNEL RELEASE Bilateral 2005    CARPAL TUNNEL RELEASE        COLONOSCOPY   3-2014-PROBABLY 08 not14     (-), 2008 POLYPS    COLONOSCOPY N/A 8/9/2019     COLORECTAL CANCER SCREENING, NOT HIGH RISK performed by Jona Arroyo MD at [address] [facility], COLON, DIAGNOSTIC        JOINT REPLACEMENT   2012     RTKR    JOINT REPLACEMENT   2018     LTKR    JOINT REPLACEMENT        KNEE ARTHROSCOPY Bilateral 2014    KNEE SURGERY        LUMBAR LAMINECTOMY   2/24/16     DR. Li 78     NC TOTAL KNEE ARTHROPLASTY Left 6/18/2018     LEFT KNEE TOTAL KNEE  ARTHROPLASTY, SUPINE, BLOCK PER TKA PROTOCOL, performed by Yaniv Harry MD at [address] [facility]   06/2020     [facility]    ROTATOR CUFF REPAIR Right 2006    SHOULDER SURGERY        SPINE SURGERY        TONSILLECTOMY   1964    TOTAL KNEE ARTHROPLASTY Right 11-3-14    ULTRASOUND PROSTATE/TRANSRECTAL N/A 3/20/2019     PROSTATE TRANSRECTAL ULTRASOUND BIOPSY performed by Bibi Escobar MD at [facility] History         Family History   Problem Relation Age of Onset    Thyroid Disease Mother      Macular Degen Mother      Arthritis Mother      High Blood Pressure Father      Heart Attack Father      Arthritis Father      Heart Disease Father      Cancer Father      No Known Problems Brother      Other Brother           viral pneumonia at age 25s    No Known Problems Son          Social History               Socioeconomic History    Marital status:        Spouse name: Not on file    Number of children: Not on file    Years of education: Not on file    Highest education level: Not on file   Occupational History    Not on file   Social Needs    Financial resource strain: Not on file    Food insecurity       Worry: Not on file       Inability: Not on file    Transportation needs       Medical: Not on file       Non-medical: Not on file   Tobacco Use    Smoking status: Former Smoker       capsule 3    oxyCODONE-acetaminophen (PERCOCET) 5-325 MG per tablet TAKE ONE TABLET BY MOUTH DAILY FOR 30 DAYS. REDUCE DOSES TAKEN AS PAIN BECOMES MANAGEABLE.        varenicline (CHANTIX STARTING MONTH CROW) 0.5 MG X 11 & 1 MG X 42 tablet As directed 1 box 0    melatonin 5 MG TABS tablet Take 5 mg by mouth daily as needed        fluticasone (FLONASE) 50 MCG/ACT nasal spray 1 spray by Nasal route daily 1 Bottle 3    Coenzyme Q10 (CO Q 10 PO) Take by mouth        Multiple Vitamin (MULTI VITAMIN DAILY PO) Take  by mouth 2 times daily.        magnesium oxide (MAG-OX) 400 MG tablet Take 400 mg by mouth daily.        Ascorbic Acid (VITAMIN C) 1000 MG tablet Take 1,000 mg by mouth daily.        B Complex Vitamins (VITAMIN-B COMPLEX PO) Take  by mouth 2 times daily.        Misc Natural Products (OSTEO BI-FLEX JOINT SHIELD PO) Take  by mouth daily.        Omega-3 Fatty Acids (FISH OIL) 1200 MG CAPS Take  by mouth 2 times daily.          No current facility-administered medications on file prior to visit.                   Review of Systems   Constitutional: Negative for fever. HENT: Negative for hearing loss. Respiratory: Negative for shortness of breath. Gastrointestinal: Positive for constipation. Negative for diarrhea and nausea. Genitourinary: Negative for difficulty urinating. Musculoskeletal: Positive for back pain. Negative for neck pain. Skin: Negative for rash. Neurological: Negative for headaches. Hematological: Does not bruise/bleed easily. Psychiatric/Behavioral: Positive for sleep disturbance.                          Objective:      Vitals:  Temp 97.5 °F (36.4 °C)   Ht 5' 9\" (1.753 m)   Wt 206 lb (93.4 kg)   BMI 30.42 kg/m²          Physical Exam  Musculoskeletal:      Lumbar back: He exhibits decreased range of motion and tenderness (Flat back noted. ). Neurological:      Mental Status: He is alert and oriented to person, place, and time.       Sensory: No sensory deficit (Intact to light touch and pinprick. ). Gait: Gait abnormal (Ambulates with back flexion about five degrees. ). Deep Tendon Reflexes: Babinski sign (Downgoing.) absent on the right side. Babinski sign (Downgoing.) absent on the left side. Reflex Scores:       Patellar reflexes are 0 on the right side and 0 on the left side. Achilles reflexes are 1+ on the right side and 1+ on the left side. Comments: Incision site is clean and dry of lumbar spine and bilateral knees. 5/5 bilateral quadriceps, iliopsoas, gastrocnemius, TA and EHL. Negative bilateral straight leg-raise. No ankle clonus bilaterally. Wife is present for exam.               Assessment:        Diagnosis Orders   1. Pseudoarthrosis of lumbar spine  FUSION SPINE LUMBAR POSTERIOR   2. Lumbar radiculopathy  FUSION SPINE LUMBAR POSTERIOR   3. Lumbar spondylosis  FUSION SPINE LUMBAR POSTERIOR         Plan:           Encounter Medications    No orders of the defined types were placed in this encounter.              Orders Placed This Encounter   Procedures    FUSION SPINE LUMBAR POSTERIOR       REMOVAL FOR HARDWARE WITH REVISION FROM L2-S1   L1-2, L2-3 PLIF       Order Specific Question:   Pre-procedure Diagnosis       Answer:   pseudoarthrosis, ddd, radiculopathy           HISTORY OF PRESENT ILLNESS:  The patient returns to my office for continued follow-up. The patient has seen me previously with a history of multiple surgery x3 including fusion instrumentation from L2 to L5 now with pseudarthrosis at L2-3 along with severe spondylosis and degenerative changes at L1-2. Unfortunately the patient continues to state worsening pain severely affecting his daily activities.   The pain is in the back along with radiation to the bilateral lower extremities.       PHYSICAL EXAMINATION:  Clinically again tenderness is noted with decreased range of motion, but stable motor, sensory, reflex findings.       STUDIES:  CT scan and MRI again were reviewed by me.      IMPRESSION:  Previous lumbar fusion instrumentation L2 through L5.       TREATMENT AND RECOMMENDATIONS:  Again to be consistent with the previous radiology report we will designate the surgical area as L2 to L5. Pseudarthrosis is at L2-3 along with significant spondylosis, degenerative changes, spinal stenosis with facet arthropathy at L1-2.       With failed conservative treatments, the patient is to undergo removal of hardware and exploration at L2 through L5 along with fusion and instrumentation at L1-2 as well as re-instrumentation at L2-3. The risks and benefits of the surgery was explained to the patient.   The patient will require a minimum of three months off work.    Guerda Thrasher MD

## 2020-09-28 NOTE — PROGRESS NOTES
PHARMACY NOTE  Morgan Hirsch was ordered Prilosec. Per the [address], this medication is non-formulary and not stocked by pharmacy. Protonix was substituted. The medication can be reordered at discharge.

## 2020-09-28 NOTE — OP NOTE
Operative Note  ______________________________________________________________    Patient: Damien Rojas  YOB: 1955  MRN: [id number]  Date of Procedure: 9/28/2020    Pre-Op Diagnosis: PSEUDOARTHROSIS, RADICULOPATHY, SPONDYLOSIS, DEGENERATIVE DISC DISEASE, previous lumbar fusion instrumentation at L2- L5 (previous surgical site designated as L2-L5 to be consistent with a CAT scan findings), pseudoarthrosis at L2-3, L1-2 lumbar spondylosis degenerative changes causing foraminal and spinal stenosis, failed conservative treatment      Post-Op Diagnosis: Same       Procedure: #1 exploration and removal of hardware L2-3, #2 L1 lumbar decompressive laminotomy and bilateral foraminotomy and facetectomy, #3 L1-2-3 bilateral pedicle screws mentation, #4 L1-2 bilateral mechanical device insertion, #5 L1-2 arthrodesis with posterior lateral fusion, #6 L2-3 posterior lateral fusion, #7 use of autograft allograft and BMP, #8 use of SSEP and fluoroscopy    Anesthesia: General    Surgeon(s):  Nav Chicas MD    Staff:    First Assistant: Jai Ngo  Scrub Person First: Sarah Disla    Estimated Blood Loss: 500 mL, 200 mL return Cell Saver    Specimens:   ID Type Source Tests Collected by Time Destination   A : disc Tissue Spine SURGICAL PATHOLOGY Nav Chicas MD 9/28/2020 8107        Implants:  Implant Name Type Inv.  Item Serial No.  Lot No. LRB No. Used Action   GRAFT CANC CHIP 30CC [id number] - [id number] Bone/Graft/Tissue/Human/Synth GRAFT CANC CHIP 30CC [id number] MUSCULOSKELETAL TRANSPLANT FND  N/A 1 Implanted   SCREW POLYAXIAL RELINE-O 2S 6.5X55MM Spine SCREW POLYAXIAL RELINE-O 2S 6.5X55MM  [employer]  N/A 4 Implanted   SCREW LK RELINE OPN TULIP 5.5MM Spine SCREW LK RELINE OPN TULIP 5.5MM  [employer]  N/A 4 Implanted   RONDA-GRAFT INFUSE KT  Spine RONDA-GRAFT INFUSE KT   CS[employer] [id number] N/A 1 Implanted   GRAFT [facility] CHIP [id number] 15CC - [id number] Bone/Graft/Tissue/Human/Synth GRAFT CANC CHIP [id number] 15CC [id number] MUSCULOSKELETAL TRANSPLANT FND  N/A 1 Implanted   IMPL CAGE SPINE COROENT LG MP [id number] Spine IMPL CAGE SPINE COROENT LG MP [id number]  [employer]  N/A 2 Implanted   SCREW TI LK OPEN TULIP ARMADA Spine SCREW TI LK OPEN TULIP ARMADA  [employer]  N/A 2 Implanted   IMPL SPINE KIARRA ASFAST CONN 5.5H-5.50 Spine IMPL SPINE KIARRA ASFAST CONN 5.5H-5.50  [employer]  N/A 2 Implanted   IMPL SPINE KIARRA RELINE-O LRDTC [id number] Spine IMPL SPINE KIARRA RELINE-O LRDTC [id number]  [employer]  N/A 2 Implanted   CONNECTOR RELINE-O X C 45-65MM 5.5 LP AD Spine CONNECTOR RELINE-O X C 45-65MM 5.5 LP AD  [employer]  N/A 1 Implanted         Drains:   Urethral Catheter Temperature probe 16 fr (Active)       Procedure:  Patient is a 62-year-old gentleman with a history of previous multiple back surgery with most recent surgery at L2-3 attachment to previous fusion at L3-L5 now admitted with a pseudoarthrosis at L2-3 along with L1-2 severe lumbar spondylosis and changes causing spinal and foraminal stenosis with failed conservative treatment. Patient understands risk-benefit surgical procedure. Also to be consistent with radiology report especially CAT scan of lumbar spine, the level of previous surgical site was estimated at L2-L5 which is different from brief op note report. He was given preoperative antibiotics and Decadron Intra-Op. Teds and compression boots are applied to prevent Intra-Op and postop DVTs. Back catheter was applied as well. Antibiotic is to be DC'd in 24 hours. After the intubation, he was carefully positioned prone on the OR table. Neck and pressure points are carefully inspected and protected. Back was then cleaned with ChloraPrep and draped sterilely in routine fashion.   Under fluoroscopy, midline incision was made and careful subperiosteal dissection was performed to expose lamina spanning from L1-L4 where previously placed screw L3 and L4 screws from previous fusions and instrument site. Cross-link was also used and sagittal balance was performed by compression with satisfaction as well. Operative field was then covered with the Gelfoam.  Frequent antibiotic irrigations applied. Hemostasis was obtained with bipolar along with thrombin-soaked Gelfoam and Surgi-Navin. Paraspinal muscle and fascia were closed with 0 Vicryl's. In 203 0 Vicryl subcu insertion stay for skin. Patient tolerated procedure well. No changes in SSEP was noted.   Erica Chiu MD   on 9/28/20 at 11:10 AM EDT

## 2020-09-28 NOTE — PROGRESS NOTES
Pt. Aware room is ready -upset due to wait time for room -reassurance and support given. Charge nurse alia made aware.

## 2020-09-28 NOTE — BRIEF OP NOTE
CARLOS RUSH  [employer]  N/A 1 Implanted         Drains:   Urethral Catheter Temperature probe 16 fr (Active)       Findings: Pseudoarthrosis    Electronically signed by Erica Chiu MD on 9/28/2020 at 11:04 AM

## 2020-09-29 ENCOUNTER — APPOINTMENT (OUTPATIENT)
Dept: GENERAL RADIOLOGY | Age: 65
DRG: 455 | End: 2020-09-29
Attending: NEUROLOGICAL SURGERY
Payer: COMMERCIAL

## 2020-09-29 VITALS
TEMPERATURE: 99.1 F | DIASTOLIC BLOOD PRESSURE: 80 MMHG | RESPIRATION RATE: 18 BRPM | WEIGHT: 205 LBS | HEIGHT: 67 IN | HEART RATE: 84 BPM | BODY MASS INDEX: 32.18 KG/M2 | SYSTOLIC BLOOD PRESSURE: 131 MMHG | OXYGEN SATURATION: 97 %

## 2020-09-29 LAB
ANION GAP SERPL CALCULATED.3IONS-SCNC: 9 MEQ/L (ref 9–15)
ATYPICAL LYMPHOCYTE RELATIVE PERCENT: 3 %
BASOPHILS ABSOLUTE: 0 K/UL (ref 0–0.2)
BASOPHILS RELATIVE PERCENT: 0.2 %
BUN BLDV-MCNC: 15 MG/DL (ref 8–23)
CALCIUM SERPL-MCNC: 8.3 MG/DL (ref 8.5–9.9)
CHLORIDE BLD-SCNC: 101 MEQ/L (ref 95–107)
CO2: 24 MEQ/L (ref 20–31)
CREAT SERPL-MCNC: 0.73 MG/DL (ref 0.7–1.2)
EOSINOPHILS ABSOLUTE: 0 K/UL (ref 0–0.7)
EOSINOPHILS RELATIVE PERCENT: 0.1 %
GFR AFRICAN AMERICAN: >60
GFR NON-AFRICAN AMERICAN: >60
GLUCOSE BLD-MCNC: 147 MG/DL (ref 70–99)
HCT VFR BLD CALC: 39.4 % (ref 42–52)
HEMOGLOBIN: 12.9 G/DL (ref 14–18)
LYMPHOCYTES ABSOLUTE: 2.9 K/UL (ref 1–4.8)
LYMPHOCYTES RELATIVE PERCENT: 13 %
MCH RBC QN AUTO: 28.6 PG (ref 27–31.3)
MCHC RBC AUTO-ENTMCNC: 32.6 % (ref 33–37)
MCV RBC AUTO: 87.6 FL (ref 80–100)
MONOCYTES ABSOLUTE: 1.7 K/UL (ref 0.2–0.8)
MONOCYTES RELATIVE PERCENT: 9.3 %
NEUTROPHILS ABSOLUTE: 13.8 K/UL (ref 1.4–6.5)
NEUTROPHILS RELATIVE PERCENT: 75 %
PDW BLD-RTO: 14.3 % (ref 11.5–14.5)
PLATELET # BLD: 214 K/UL (ref 130–400)
PLATELET SLIDE REVIEW: NORMAL
POTASSIUM REFLEX MAGNESIUM: 4.2 MEQ/L (ref 3.4–4.9)
RBC # BLD: 4.5 M/UL (ref 4.7–6.1)
RBC # BLD: NORMAL 10*6/UL
SODIUM BLD-SCNC: 134 MEQ/L (ref 135–144)
WBC # BLD: 18.4 K/UL (ref 4.8–10.8)

## 2020-09-29 PROCEDURE — 94150 VITAL CAPACITY TEST: CPT

## 2020-09-29 PROCEDURE — 6370000000 HC RX 637 (ALT 250 FOR IP): Performed by: NEUROLOGICAL SURGERY

## 2020-09-29 PROCEDURE — 80048 BASIC METABOLIC PNL TOTAL CA: CPT

## 2020-09-29 PROCEDURE — 6360000002 HC RX W HCPCS: Performed by: NEUROLOGICAL SURGERY

## 2020-09-29 PROCEDURE — 97162 PT EVAL MOD COMPLEX 30 MIN: CPT

## 2020-09-29 PROCEDURE — 72100 X-RAY EXAM L-S SPINE 2/3 VWS: CPT

## 2020-09-29 PROCEDURE — 85025 COMPLETE CBC W/AUTO DIFF WBC: CPT

## 2020-09-29 PROCEDURE — 36415 COLL VENOUS BLD VENIPUNCTURE: CPT

## 2020-09-29 PROCEDURE — 2500000003 HC RX 250 WO HCPCS: Performed by: NEUROLOGICAL SURGERY

## 2020-09-29 PROCEDURE — 97165 OT EVAL LOW COMPLEX 30 MIN: CPT

## 2020-09-29 RX ORDER — OXYCODONE AND ACETAMINOPHEN 7.5; 325 MG/1; MG/1
1 TABLET ORAL EVERY 6 HOURS PRN
Qty: 40 TABLET | Refills: 0 | Status: SHIPPED | OUTPATIENT
Start: 2020-09-29 | End: 2020-10-13

## 2020-09-29 RX ORDER — CYCLOBENZAPRINE HCL 10 MG
10 TABLET ORAL 3 TIMES DAILY
Qty: 30 TABLET | Refills: 0 | Status: SHIPPED | OUTPATIENT
Start: 2020-09-29 | End: 2020-10-09 | Stop reason: SDUPTHER

## 2020-09-29 RX ORDER — CEPHALEXIN 500 MG/1
500 CAPSULE ORAL 3 TIMES DAILY
Qty: 21 CAPSULE | Refills: 0 | Status: SHIPPED | OUTPATIENT
Start: 2020-09-29 | End: 2020-10-06

## 2020-09-29 RX ORDER — AMOXICILLIN 250 MG
1 CAPSULE ORAL 2 TIMES DAILY
Qty: 28 TABLET | Refills: 0 | Status: SHIPPED | OUTPATIENT
Start: 2020-09-29 | End: 2020-10-13

## 2020-09-29 RX ORDER — FENTANYL 25 UG/H
1 PATCH TRANSDERMAL
Qty: 5 PATCH | Refills: 0 | Status: SHIPPED | OUTPATIENT
Start: 2020-10-01 | End: 2020-10-15

## 2020-09-29 RX ADMIN — CEPHALEXIN 500 MG: 500 CAPSULE ORAL at 06:29

## 2020-09-29 RX ADMIN — OXYCODONE HYDROCHLORIDE AND ACETAMINOPHEN 1 TABLET: 7.5; 325 TABLET ORAL at 13:44

## 2020-09-29 RX ADMIN — METOPROLOL SUCCINATE 100 MG: 100 TABLET, EXTENDED RELEASE ORAL at 09:36

## 2020-09-29 RX ADMIN — OXYCODONE HYDROCHLORIDE AND ACETAMINOPHEN 1 TABLET: 7.5; 325 TABLET ORAL at 00:14

## 2020-09-29 RX ADMIN — CYCLOBENZAPRINE 10 MG: 10 TABLET, FILM COATED ORAL at 06:53

## 2020-09-29 RX ADMIN — OXYCODONE HYDROCHLORIDE AND ACETAMINOPHEN 1 TABLET: 7.5; 325 TABLET ORAL at 17:56

## 2020-09-29 RX ADMIN — OXYBUTYNIN CHLORIDE 5 MG: 5 TABLET ORAL at 09:29

## 2020-09-29 RX ADMIN — PANTOPRAZOLE SODIUM 40 MG: 40 TABLET, DELAYED RELEASE ORAL at 06:29

## 2020-09-29 RX ADMIN — CYCLOBENZAPRINE 10 MG: 10 TABLET, FILM COATED ORAL at 15:16

## 2020-09-29 RX ADMIN — POTASSIUM CHLORIDE, DEXTROSE MONOHYDRATE AND SODIUM CHLORIDE: 150; 5; 450 INJECTION, SOLUTION INTRAVENOUS at 03:46

## 2020-09-29 RX ADMIN — DOCUSATE SODIUM 50 MG AND SENNOSIDES 8.6 MG 1 TABLET: 8.6; 5 TABLET, FILM COATED ORAL at 09:29

## 2020-09-29 RX ADMIN — CEFAZOLIN SODIUM 2 G: 2 SOLUTION INTRAVENOUS at 07:30

## 2020-09-29 RX ADMIN — CEPHALEXIN 500 MG: 500 CAPSULE ORAL at 13:44

## 2020-09-29 RX ADMIN — OXYCODONE HYDROCHLORIDE AND ACETAMINOPHEN 1 TABLET: 7.5; 325 TABLET ORAL at 09:29

## 2020-09-29 RX ADMIN — Medication 400 MG: at 09:29

## 2020-09-29 RX ADMIN — OXYCODONE HYDROCHLORIDE AND ACETAMINOPHEN 1 TABLET: 7.5; 325 TABLET ORAL at 05:16

## 2020-09-29 RX ADMIN — OXYBUTYNIN CHLORIDE 5 MG: 5 TABLET ORAL at 15:15

## 2020-09-29 ASSESSMENT — PAIN DESCRIPTION - ORIENTATION: ORIENTATION: LOWER

## 2020-09-29 ASSESSMENT — PAIN SCALES - GENERAL
PAINLEVEL_OUTOF10: 7
PAINLEVEL_OUTOF10: 6
PAINLEVEL_OUTOF10: 7
PAINLEVEL_OUTOF10: 7
PAINLEVEL_OUTOF10: 5
PAINLEVEL_OUTOF10: 7

## 2020-09-29 ASSESSMENT — PAIN DESCRIPTION - PAIN TYPE
TYPE: SURGICAL PAIN
TYPE: SURGICAL PAIN

## 2020-09-29 ASSESSMENT — PAIN DESCRIPTION - LOCATION
LOCATION: BACK
LOCATION: BACK

## 2020-09-29 ASSESSMENT — PAIN DESCRIPTION - DESCRIPTORS: DESCRIPTORS: ACHING

## 2020-09-29 NOTE — PLAN OF CARE
Problem: Pain:  Description: Pain management should include both nonpharmacologic and pharmacologic interventions. Goal: Pain level will decrease  Description: Pain level will decrease  Outcome: Ongoing     Problem: Pain:  Description: Pain management should include both nonpharmacologic and pharmacologic interventions. Goal: Control of acute pain  Description: Control of acute pain  Outcome: Ongoing     Problem: Pain:  Description: Pain management should include both nonpharmacologic and pharmacologic interventions.   Goal: Control of chronic pain  Description: Control of chronic pain  Outcome: Ongoing     Problem: Falls - Risk of:  Goal: Will remain free from falls  Description: Will remain free from falls  Outcome: Ongoing  Goal: Absence of physical injury  Description: Absence of physical injury  Outcome: Ongoing     Problem: Skin Integrity:  Goal: Will show no infection signs and symptoms  Description: Will show no infection signs and symptoms  Outcome: Ongoing  Goal: Absence of new skin breakdown  Description: Absence of new skin breakdown  Outcome: Ongoing

## 2020-09-29 NOTE — FLOWSHEET NOTE
Reviewed discharge instructions with patient and wife including follow-up appointments, new prescriptions, education on new prescriptions and dressing change instructions. Changed patient mid lower back dressing, removed old dressing and 31 stapes intact. To the right of stapes, there were some tape burns noted, cleaned with saline and applied 4x4 and porus tape. Applied new island dressing to back, patient tolerated well. Requested transport, patient wife providing ride home. Patient now discharged home.

## 2020-09-29 NOTE — PROGRESS NOTES
[facility] OCCUPATIONAL THERAPY EVALUATION - ACUTE     NAME: Elias Cancer  : 1955 (71 y.o.)  MRN: [id number]  CODE STATUS: Full Code  [address]: [address]    Date of Service: 2020    Patient Diagnosis(es): Lumbar spondylosis [M47.816]   No chief complaint on file.     Patient Active Problem List    Diagnosis Date Noted    Lumbar spondylosis 2020    Pseudoarthrosis of lumbar spine 2020    Fatty liver     Hepatitis C without hepatic coma     Pro[facility] cancer ([facility] [address]     Adenomatous polyp of descending colon     Adenomatous polyp of sigmoid colon     Elevated PSA 2019    Osteoarthritis of left knee 2018    Osteoarthritis of spine with radiculopathy, lumbar region 2016    Lumbar stenosis 2015    Lumbar degenerative disc disease 2015    Laryngitis from reflux of stomach acid 2015    CHF (congestive heart failure) ([facility] [address] 2015    GERD (gastroesophageal reflux disease)     Osteoarthritis     Hyperlipidemia     Essential hypertension     Depression         Past Medical History:   Diagnosis Date    Anemia     CHF (congestive heart failure) ([address]     Patient denies hx    Depression     Essential hypertension     Fatty liver     GERD (gastroesophageal reflux disease)     Hepatitis C without hepatic coma     Hepatitis C without hepatic coma     Hyperlipidemia     meds > 5 yrs    Hypertension     meds > 5 yrs    Insomnia     Osteoarthritis     Osteoarthritis of spine with radiculopathy, lumbar region 2016    Prostate cancer ([facility] [address] 2019    OR -- no chemo or radiation    Prostate cancer [facility])      Past Surgical History:   Procedure Laterality Date    BACK SURGERY  2005    lumbar disc OR / fusion    BACK SURGERY  2016    fusion    CARPAL TUNNEL RELEASE Bilateral 2005    CARPAL TUNNEL RELEASE      COLONOSCOPY  3--PROBABLY 08 not14    (-), 2008 POLYPS    COLONOSCOPY N/A 2019    COLORECTAL CANCER SCREENING, NOT HIGH RISK performed by Patricia Hess MD at [address] [facility], COLON, DIAGNOSTIC      JOINT REPLACEMENT  2012    RTKR    JOINT REPLACEMENT  2018    LTKR    JOINT REPLACEMENT      KNEE ARTHROSCOPY Bilateral 2014    KNEE SURGERY Left     before LTKR    LUMBAR FUSION N/A 9/28/2020    REMOVAL OF HARDWARE AT L3-4 L2,L3,L4 INSTRUMENTATION L2-3 PLIF L3-4 POSTEROLATERAL FUSION performed by Toni Israel MD at [address]  2/24/16    DR. Li 78     [place] TOTAL KNEE ARTHROPLASTY Left 6/18/2018    LEFT KNEE TOTAL KNEE  ARTHROPLASTY, SUPINE, BLOCK PER TKA PROTOCOL, performed by Dougie Miranda MD at [address] [facility]  06/2020    [facility]    ROTATOR CUFF REPAIR Right 2006    SHOULDER SURGERY      SPINE SURGERY      TONSILLECTOMY  1964    TOTAL KNEE ARTHROPLASTY Right 11-3-14    ULTRASOUND PROSTATE/TRANSRECTAL N/A 3/20/2019    PROSTATE TRANSRECTAL ULTRASOUND BIOPSY performed by Dhiraj Bolivar MD at [place] [address]  Restrictions/Precautions: Fall Risk(High per tabor)  Position Activity Restriction  Spinal Precautions: Limit bending/lifting/twisting  Other position/activity restrictions: LSO PRN per orders; pt. states he was told not to wear until staples come out. Safety Devices: Safety Devices  Safety Devices in place: Yes  Type of devices:  All fall risk precautions in place        Subjective  Pre Treatment Pain Screening  Pain at present: 7  Scale Used: Numeric Score  Intervention List: Patient able to continue with treatment, Nurse/Physician notified  Comments / Details: Pt. states he is due for pain medication at 9:15. RN notified pt. would like pain medication    Pain Reassessment:   Pain Assessment  Patient Currently in Pain: Yes  Pain Assessment: 0-10  Pain Level: 7  Pain Type: Surgical pain  Pain Location: Back  Pain Orientation: Lower  Pain Descriptors: Aching       Prior Level of Function:  Social/Functional History  Lives With: Spouse  Type of Home: House  Home Layout: One level  Home Access: Stairs to enter with rails  Entrance Stairs - Number of Steps: 3  Bathroom Shower/Tub: Tub/Shower unit  Home Equipment: Rolling walker, Cane  ADL Assistance: Independent  Homemaking Assistance: Independent  Ambulation Assistance: Independent  Transfer Assistance: Independent  Active : Yes    OBJECTIVE:     Orientation Status:  Orientation  Overall Orientation Status: Within Normal Limits    Observation:  Observation/Palpation  Posture: Good  Observation: Pt. alert and attentive. Cognition Status:  Cognition  Overall Cognitive Status: WFL    Perception Status:  Perception  Overall Perceptual Status: WFL    Sensation Status:  Sensation  Overall Sensation Status: WFL(Denies parasthesia)    Vision and Hearing Status:  Vision  Vision: Within Functional Limits  Hearing  Hearing: Within functional limits     ROM:   LUE AROM (degrees)  LUE AROM : WNL  Left Hand AROM (degrees)  Left Hand AROM: WNL  RUE AROM (degrees)  RUE AROM : WNL  Right Hand AROM (degrees)  Right Hand AROM: WNL    Strength:  LUE Strength  Gross LUE Strength: WFL  L Hand General: 4/5  LUE Strength Comment: 4/5 all planes  RUE Strength  Gross RUE Strength: WFL  R Hand General: 4/5  RUE Strength Comment: 4/5 all planes    Coordination, Tone, Quality of Movement: Tone RUE  RUE Tone: Normotonic  Tone LUE  LUE Tone: Normotonic  Coordination  Movements Are Fluid And Coordinated: Yes    Hand Dominance:  Hand Dominance  Hand Dominance: Right    ADL Status:  ADL  Feeding: Independent  Grooming: Independent  UE Bathing: Independent  LE Bathing: Independent  UE Dressing: Independent  LE Dressing: Independent  Toileting: Independent  Additional Comments: Pt. ambulated safely around room and simulated ADL tasks. Was able to don socks with no difficulty with figure 4 and required no assist for pants management for toileting.   Toilet Transfers  Toilet - Technique: Ambulating  Equipment Used: Grab bars  Toilet Transfer: Modified independent       Therapy key for assistance levels -   Independent = Pt. is able to perform task with no assistance but may require a device   Stand by assistance = Pt. does not perform task at an independent level but does not need physical assistance, requires verbal cues  Minimal, Moderate, Maximal Assistance = Pt. requires physical assistance (25%, 50%, 75% assist from helper) for task but is able to actively participate in task   Dependent = Pt. requires total assistance with task and is not able to actively participate with task completion     Functional Mobility:  Functional Mobility  Functional - Mobility Device: Rolling Walker  Activity: To/from bathroom  Assist Level: Stand by assistance  Functional Mobility Comments: Increased time; does ambulate slowly but requires no cues or assistance for mobility  Transfers  Sit to stand: Independent  Stand to sit: Independent    Bed Mobility  Bed mobility  Rolling to Right: Independent  Supine to Sit: Independent  Sit to Supine: Independent  Comment: Good recall of log-roll technique. Does perform with HOB elevated for comfort    Seated and Standing Balance:  Balance  Sitting Balance: Independent  Standing Balance: Stand by assistance    Functional Endurance:  Activity Tolerance  Activity Tolerance: Patient Tolerated treatment well    D/C Recommendations:  OT D/C RECOMMENDATIONS  REQUIRES OT FOLLOW UP: No    Equipment Recommendations:  OT Equipment Recommendations  Other: None    OT Education:   OT Education  OT Education: OT Role, Plan of Care  Patient Education: Educated pt. on role of acute care OT  Barriers to Learning: None    OT Follow Up:  OT D/C RECOMMENDATIONS  REQUIRES OT FOLLOW UP: No       Assessment/Discharge Disposition:  Assessment: Pt. is a 63year old man from home who presents to [address] [place] [address] for planned spinal sx. No significant deficits which impact his ability to perform ADLs and states he has family assist at home if needed.  No

## 2020-09-29 NOTE — PROGRESS NOTES
Physical Therapy Med Surg Initial Assessment  Facility/Department: [facility] [place] [facility]  Room: N222/N222-01       NAME: Isha Tee  : 1955 (89 y.o.)  MRN: [id number]  CODE STATUS: Full Code    Date of Service: 2020    Patient Diagnosis(es): Lumbar spondylosis [M47.816]   No chief complaint on file.     Patient Active Problem List    Diagnosis Date Noted    Lumbar spondylosis 2020    Pseudoarthrosis of lumbar spine 2020    Fatty liver     Hepatitis C without hepatic coma     [facility]     Adenomatous polyp of descending colon     Adenomatous polyp of sigmoid colon     Elevated PSA 2019    Osteoarthritis of left knee 2018    Osteoarthritis of spine with radiculopathy, lumbar region 2016    Lumbar stenosis 2015    Lumbar degenerative disc disease 2015    Laryngitis from reflux of stomach acid 2015    CHF (congestive heart failure) ([facility] 2015    GERD (gastroesophageal reflux disease)     Osteoarthritis     Hyperlipidemia     Essential hypertension     Depression         Past Medical History:   Diagnosis Date    Anemia     CHF (congestive heart failure) (HCC)     Patient denies hx    Depression     Essential hypertension     Fatty liver     GERD (gastroesophageal reflux disease)     Hepatitis C without hepatic coma     Hepatitis C without hepatic coma     Hyperlipidemia     meds > 5 yrs    Hypertension     meds > 5 yrs    Insomnia     Osteoarthritis     Osteoarthritis of spine with radiculopathy, lumbar region 2016    Prostate cancer ([facility] 2019    OR -- no chemo or radiation    Prostate cancer [facility])      Past Surgical History:   Procedure Laterality Date    BACK SURGERY  2005    lumbar disc OR / fusion    BACK SURGERY  2016    fusion    CARPAL TUNNEL RELEASE Bilateral 2005    CARPAL TUNNEL RELEASE      COLONOSCOPY  3--PROBABLY 08 not14    (-), 2008 POLYPS    COLONOSCOPY N/A 2019    COLORECTAL CANCER SCREENING, NOT HIGH RISK performed by Pierre Hubbard MD at [address], COLON, DIAGNOSTIC      JOINT REPLACEMENT  2012    RTKR    JOINT REPLACEMENT  2018    LTKR    JOINT REPLACEMENT      KNEE ARTHROSCOPY Bilateral 2014    KNEE SURGERY Left     before LTKR    LUMBAR FUSION N/A 9/28/2020    REMOVAL OF HARDWARE AT L3-4 L2,L3,L4 INSTRUMENTATION L2-3 PLIF L3-4 POSTEROLATERAL FUSION performed by Red Flores MD at [address]  2/24/16    DR. Li 78     TN TOTAL KNEE ARTHROPLASTY Left 6/18/2018    LEFT KNEE TOTAL KNEE  ARTHROPLASTY, SUPINE, BLOCK PER TKA PROTOCOL, performed by Quirino Mckeon MD at [address]  06/2020    [facility]    ROTATOR CUFF REPAIR Right 2006    SHOULDER SURGERY      SPINE SURGERY      TONSILLECTOMY  1964    TOTAL KNEE ARTHROPLASTY Right 11-3-14    ULTRASOUND PROSTATE/TRANSRECTAL N/A 3/20/2019    PROSTATE TRANSRECTAL ULTRASOUND BIOPSY performed [facility] Jj Roe [facility] [place] [facility] [place]       Chart Reviewed: Yes  Patient assessed for rehabilitation services?: Yes  Family / Caregiver Present: No  General Comment  Comments: Pt is resting in bed, upon arrival, agrees to PT eval.    Restrictions:  Restrictions/Precautions: Fall Risk(High per [employer])  Position Activity Restriction  Spinal Precautions: Limit bending/lifting/twisting  Other position/activity restrictions: LSO PRN per orders; pt. states he was told not to wear until staples come out, left brace at home     SUBJECTIVE: Subjective: States this is not his first back surgery.     Pain  Pre Treatment Pain Screening  Pain at present: 5  Intervention List: Patient able to continue with treatment;Nurse/physician notified    Post Treatment Pain Screening:   Pain Screening  Patient Currently in Pain: Yes  Pain Assessment  Pain Level: 7(after mobility)  Pain Type: Surgical pain  Pain Location: Back    Prior Level of Function:  Social/Functional History  Lives With: Spouse  Type of Home: House  Home Layout: One level  Home Access: Stairs to enter with rails  Entrance Stairs - Number of Steps: 3  Bathroom Shower/Tub: Tub/Shower unit  Home Equipment: Rolling walker, Cane  ADL Assistance: Independent  Homemaking Assistance: Independent  Ambulation Assistance: Independent  Transfer Assistance: Independent  Active : Yes  Occupation: Full time employment  Type of occupation:   Leisure & Hobbies: baseball umpire    OBJECTIVE:        Cognition:  Overall Orientation Status: Within [facility]: Within Functional Limits    Observation/Palpation  Posture: Good    ROM:  RLE AROM: WFL  LLE AROM : WFL    Strength:  Strength RLE  Strength RLE: WFL  Strength LLE  Strength LLE: WFL    Neuro:  Balance  Sitting - Static: Good  Sitting - Dynamic: Good  Standing - Static: Good;-  Standing - Dynamic: Good;-(compensates appropriately with [employer] though able to perform minimal dynamic tasks in standing without UE support)        Sensation  Overall Sensation Status: WFL    Bed mobility  Rolling to Right: Independent  Supine to Sit: Independent  Sit to Supine: Independent  Comment: indep performs log roll technique    Transfers  Sit to Stand: Supervision  Stand to sit: Supervision  Bed to Chair: Supervision    Ambulation  Ambulation?: Yes  Ambulation 1  Surface: level tile  Device: Rolling Walker  Assistance: Supervision  Quality of Gait: slow pace, mildly flexed posture, cues for walker proximity, decreased heel strike  Distance: 120 ft              Activity Tolerance  Activity Tolerance: Patient Tolerated treatment well          PT Education  PT Education: Goals;PT Role;Plan of Care;Home Exercise Program;General Safety;Precautions; Equipment    ASSESSMENT:   Body structures, Functions, Activity limitations: Decreased functional mobility ; Decreased strength;Decreased balance; Increased pain  Decision Making: Medium Complexity  History: medium  Exam: medium  Clinical Presentation: medium    Prognosis: Good    DISCHARGE RECOMMENDATIONS:  Discharge Recommendations: Patient would benefit from continued therapy after discharge    Assessment: Pt is POD #1 lumbar surgery, tolerated mobility well and understands precautions, mobility recommendations, use of LSO, safety with WW as needed. Continue PT to return to PLOF. REQUIRES PT FOLLOW UP: Yes      PLAN OF CARE:  Plan  Times per week: 1-3 visits  Current Treatment Recommendations: Strengthening, Functional Mobility Training, Neuromuscular Re-education, Home Exercise Program, Equipment Evaluation, Education, & procurement, Transfer Training, Gait Training, Safety Education & Training, Balance Training, Endurance Training, Stair training, Patient/Caregiver Education & Training  Safety Devices  Type of devices: Call light within reach, Chair alarm in place    Goals:  Patient goals : to go home  Long term goals  Long term goal 1: pt to be indep with transfers/gait with LRAD household distances and improved upright posture  Long term goal 2: pt to be indep with HEP  Long term goal 3: pt to navigate 4 steps with 1 HR modified indep    [facility] (6 CLICK) [address] Mobility Raw Score : 19     Therapy Time:   Individual   Time In 0855   Time Out 0912   Minutes [address], 09/29/20 at 10:35 AM         Definitions for assistance levels  Independent = pt does not require any physical supervision or assistance from another person for activity completion. Device may be needed.   Stand by assistance = pt requires verbal cues or instructions from another person, close to but not touching, to perform the activity  Minimal assistance= pt performs 75% or more of the activity; assistance is required to complete the activity  Moderate assistance= pt performs 50% of the activity; assistance is required to complete the activity  Maximal assistance = pt performs 25% of the activity; assistance is required to complete the activity  Dependent = pt requires total physical assistance to accomplish the task

## 2020-09-29 NOTE — CARE COORDINATION
[facility] [place] Case Management Initial Discharge Assessment    Met with Patient to discuss discharge plan. Risk of readmission is 10%    PCP: Cheryle Sawant MD                                Date of Last Visit: virtual visit 8/2020    If no PCP, list provided? N/A    Discharge Planning    Living Arrangements: independently at home    Who do you live with? Wife Everett Alvarado    Who helps you with your care:  self    If lives at home:     Do you have any barriers navigating in your home? no    Patient can perform ADL? Yes    Current Services (outpatient and in home) :  None    Dialysis: No    Is transportation available to get to your appointments? Yes    DME Equipment:  yes - walker, cane , wheelchair, shower chair, BSC    Respiratory equipment: None    Respiratory provider:  no     Pharmacy:  yes - [address] with Medication Assistance Program?  No      Patient agreeable to [place] ?      n/a    Patient agreeable to SNF/Rehab? No    Other discharge needs identified? Outpatient therapy. Has been to [facility] in the past.     Freedom of choice list provided with basic dialogue that supports the patient's individualized plan of care/goals and shares the quality data associated with the providers. Yes    The plan for Transition of Care is related to the following treatment goals: Await medical release from doctor to go home. Initial Discharge Plan? (Note: please see concurrent daily documentation for any updates after initial note). Met with patient who reports he was independent prior to surgery. He would like to go back to [facility] outpatient therapy. He has no DME needs.      The Patient and/or patient representative: Patient was provided with choice of any post-acute providers for care and equipment and agrees with discharge plan  Yes    Electronically signed by ARMANDO Romero on 9/29/2020 at 11:11 AM   Electronically signed by ARMANDO Romero on 9/29/2020 at 11:17 AM

## 2020-09-30 ENCOUNTER — CARE COORDINATION (OUTPATIENT)
Dept: CASE MANAGEMENT | Age: 65
End: 2020-09-30

## 2020-09-30 PROBLEM — R97.20 ELEVATED PSA: Status: RESOLVED | Noted: 2019-03-07 | Resolved: 2020-09-30

## 2020-09-30 NOTE — CARE COORDINATION
9/28-9/29/2020 [address] s/p REMOVAL OF HARDWARE AT L3-4 L2,L3,L4 INSTRUMENTATION L2-3 PLIF L3-4 POSTEROLATERAL FUSION   NR  PCP 10/6 :30, Dr Anastasia Galeana 10/16 9:00  No CV-19 lab performed this admit    CTN request call back to P: [phone] for initial CV-19 outreach.

## 2020-09-30 NOTE — PROGRESS NOTES
Physical Therapy  [facility]  Physical Therapy Discharge      NAME: Dionna Martinez    : 1955 (84 y.o.)  MRN: [id number]    Account: [de-identified]  Gender: male      Patient has been discharged from acute care hospital. DC patient from current PT program.      Electronically signed by Paul Matamoros PT on 20 at 11:50 AM EDT

## 2020-10-01 ENCOUNTER — CARE COORDINATION (OUTPATIENT)
Dept: CASE MANAGEMENT | Age: 65
End: 2020-10-01

## 2020-10-01 NOTE — CARE COORDINATION
Patient contacted regarding Minnie Gonzales. Discussed COVID-19 related testing which was not done at this time. Test results were not done. Patient informed of results, if available? No    Care Transition Nurse/ Ambulatory Care Manager contacted the patient by telephone to perform post discharge assessment. Call within 2 business days of discharge: Yes. Verified name and  with patient as identifiers. Provided introduction to self, and explanation of the CTN/ACM role, and reason for call due to risk factors for infection and/or exposure to COVID-19. Symptoms reviewed with patient who verbalized the following symptoms: no new symptoms. Due to no new or worsening symptoms encounter was not routed to provider for escalation. Discussed follow-up appointments. If no appointment was previously scheduled, appointment scheduling offered: Yes  [facility] follow up appointment(s):   Future Appointments   Date Time Provider Cas Arnold   10/6/2020  1:30 PM Michael Clark  [place]   10/16/2020  9:00 AM Lewis Palm MD [facility]     [facility] follow up appointment(s): 10/8/2020    Non-face-to-face services provided:  Scheduled appointment with PCP-10/8/2020     Advance Care Planning:   Does patient have an Advance Directive:  reviewed and current. Patient has following risk factors of: heart failure. CTN/ACM reviewed discharge instructions, medical action plan and red flags such as increased shortness of breath, increasing fever and signs of decompensation with patient who verbalized understanding. Discussed exposure protocols and quarantine with CDC Guidelines What to do if you are sick with coronavirus disease .  Patient was given an opportunity for questions and concerns. The patient agrees to contact the [facility] exposure line [phone], [facility] [place] [facility]: ([phone]) and PCP office for questions related to their healthcare.  CTN/ACM provided contact information for future needs. Reviewed and educated patient on any new and changed medications related to discharge diagnosis     Patient/family/caregiver given information for GetWell Loop and agrees to enroll no  Patient's preferred e-mail:    Patient's preferred phone number:   Based on Loop alert triggers, patient will be contacted by nurse care manager for worsening symptoms. Plan for follow-up call in 3-5 days based on severity of symptoms and risk factors. Called and spoke with patient. Patient states he is doing pretty good at this time. Back still sore. Has appointment next week. Having red drainage from back incision site. Dressings changed by wife (who is a nurse). Incision area not hot, painful, red, weeping fluid or draining pus, or swollen. Patient has all medications is taking medications as directed and has no questions concerning medications at this time. Patient knows when to contact physician or report to ED with worsening or severe symptoms, changes, or concerns.     Darell Gannon, DELIA     [address] [facility] / [facility]

## 2020-10-06 ENCOUNTER — OFFICE VISIT (OUTPATIENT)
Dept: FAMILY MEDICINE CLINIC | Age: 65
End: 2020-10-06
Payer: COMMERCIAL

## 2020-10-06 VITALS
HEIGHT: 68 IN | TEMPERATURE: 98.5 F | BODY MASS INDEX: 30.62 KG/M2 | OXYGEN SATURATION: 96 % | DIASTOLIC BLOOD PRESSURE: 78 MMHG | RESPIRATION RATE: 16 BRPM | HEART RATE: 93 BPM | WEIGHT: 202 LBS | SYSTOLIC BLOOD PRESSURE: 136 MMHG

## 2020-10-06 PROCEDURE — 99213 OFFICE O/P EST LOW 20 MIN: CPT | Performed by: FAMILY MEDICINE

## 2020-10-06 RX ORDER — OXYBUTYNIN CHLORIDE 5 MG/1
5 TABLET ORAL 3 TIMES DAILY PRN
Qty: 90 TABLET | Refills: 5 | Status: ON HOLD | OUTPATIENT
Start: 2020-10-06 | End: 2021-09-10

## 2020-10-06 NOTE — PROGRESS NOTES
Patient: Frieda Aguilar    YOB: 1955    Date: 10/6/20    Chief Complaint   Patient presents with    Follow-Up from Hospital     Patient was admitted to [facility] on 9/28/2020 to 9/29/2020 for lumbar fusion. He states he is doing well.  Health Maintenance     He would like to wait until he has follow up with Dr. Salazar Holley. Patient Active Problem List    Diagnosis Date Noted    Lumbar spondylosis 09/28/2020    Pseudoarthrosis of lumbar spine 09/21/2020    Fatty liver     Hepatitis C without hepatic coma     Prostate cancer (HCC)     Adenomatous polyp of descending colon     Adenomatous polyp of sigmoid colon     Osteoarthritis of left knee 06/18/2018    Osteoarthritis of spine with radiculopathy, lumbar region 01/19/2016    Lumbar stenosis 12/30/2015    Lumbar degenerative disc disease 12/30/2015    Laryngitis from reflux of stomach acid 08/04/2015    CHF (congestive heart failure) ([address] 02/04/2015    GERD (gastroesophageal reflux disease)     Osteoarthritis     Hyperlipidemia     Essential hypertension     Depression        No Known Allergies    Vitals:    10/06/20 1349   BP: 136/78   Site: Left Upper Arm   Position: Sitting   Cuff Size: Large Adult   Pulse: 93   Resp: 16   Temp: 98.5 °F (36.9 °C)   TempSrc: Oral   SpO2: 96%   Weight: 202 lb (91.6 kg)   Height: 5' 7.5\" (1.715 m)     Body mass index is 31.17 kg/m². PHQ Scores 9/25/2017   PHQ2 Score 0   PHQ9 Score 0     Interpretation of Total Score Depression Severity: 1-4 = Minimal depression, 5-9 = Mild depression, 10-14 = Moderate depression, 15-19 = Moderately severe depression, 20-27 = Severe depression    HPI    Was just discharged from [facility] after his fourth lumbar fusion. He is doing well with no change in bowels and bladder no fever no chills he is got no discharge from his wound no calf tenderness and no shortness of breath.   We discussed his medication including his high-dose Prilosec and the fact that he is post to be on the Ditropan because of his bladder dysfunction after his prostate cancer surgery. Review of Systems    Constitutional: Negative for fatigue, fever and positive for night sweats. HEENT: Negative for eye discharge and vision loss. Negative for ear drainage, hearing loss and nasal drainage. Respiratory: Negative for cough, dyspnea and wheezing. Cardiovascular:  Negative for chest pain, claudication and irregular heartbeat/palpitations. Gastrointestinal: Negative for abdominal pain, nausea, vomiting, constipation and diarrhea. Genitourinary: No dysuria or penile discharge. Metabolic/Endocrine: Negative for cold intolerance, heat intolerance, polydipsia and polyphagia. No unintended weight loss or gain. Neuro/Psychiatric: Negative for gait disturbance. Negative for psychiatric symptoms. Dermatologic: Negative for pruritus and rash. Musculoskeletal: positive for bone/joint symptoms. No numbness or tingling. No weakness. Hematology: Negative for bleeding and easy bruising. Immunology:  Negative for environmental allergies and food allergies. Physical Exam    Patient's medication, allergies, past medical, surgical, social and family histories were reviewed and updated as appropriate. PHYSICAL EXAM   General appearance: Alert oriented pleasant cooperative in no acute distress. Lungs: Clear to auscultation  Heart: Regular rate and rhythm  Back: The small amount of the incision that I saw is clean and dry without erythema and the staples were in place          Assessment:   Diagnosis Orders   1. Pseudoarthrosis of lumbar spine     2. Lumbar degenerative disc disease   taper Prilosec as he recovers from his back surgery   3.  Essential hypertension   stable     Recheck renal ultrasound persistent follow-up as well as recheck a PSA after his prostate cancer surgery as requested by the [email or web address] [facility] [employer] clinic          Plan:  Current Outpatient Medications   Medication Sig Dispense Refill    oxybutynin (DITROPAN) 5 MG tablet Take 1 tablet by mouth 3 times daily as needed (incontinence) 90 tablet 5    fentaNYL (DURAGESIC) 25 MCG/HR Place 1 patch onto the skin every 72 hours for 14 days. 5 patch 0    oxyCODONE-acetaminophen (PERCOCET) 7.5-325 MG per tablet Take 1 tablet by mouth every 6 hours as needed for Pain for up to 14 days. 40 tablet 0    cephALEXin (KEFLEX) 500 MG capsule Take 1 capsule by mouth 3 times daily for 7 days 21 capsule 0    senna-docusate (PERICOLACE) 8.6-50 MG per tablet Take 1 tablet by mouth 2 times daily for 14 days 28 tablet 0    cyclobenzaprine (FLEXERIL) 10 MG tablet Take 1 tablet by mouth 3 times daily for 10 days 30 tablet 0    hydroxychloroquine (PLAQUENIL) 200 MG tablet Take 400 mg by mouth every evening       docusate sodium (COLACE) 100 MG capsule Take 100 mg by mouth 2 times daily as needed for Constipation      traZODone (DESYREL) 50 MG tablet Take 1 tablet by mouth nightly as needed for Sleep 60 tablet 1    atorvastatin (LIPITOR) 20 MG tablet TAKE 1 TABLET BY MOUTH ONE TIME A DAY (Patient taking differently: Take 20 mg by mouth every evening TAKE 1 TABLET BY MOUTH ONE TIME A DAY) 90 tablet 3    metoprolol succinate (TOPROL XL) 100 MG extended release tablet TAKE 1 TABLET BY MOUTH ONE TIME A DAY 90 tablet 3    omeprazole (PRILOSEC) 20 MG delayed release capsule TAKE THREE CAPSULES BY MOUTH EVERY DAY FOR STOMACH 270 capsule 1    varenicline (CHANTIX STARTING MONTH PAK) 0.5 MG X 11 & 1 MG X 42 tablet As directed 1 box 0    fluticasone (FLONASE) 50 MCG/ACT nasal spray 1 spray by Nasal route daily 1 Bottle 3     No current facility-administered medications for this visit. No orders of the defined types were placed in this encounter.       Orders Placed This Encounter   Medications    oxybutynin (DITROPAN) 5 MG tablet     Sig: Take 1 tablet by mouth 3 times daily as needed (incontinence)     Dispense:  90 tablet     Refill:  5              Return in about 8 weeks (around 11/30/2020).     Dr. Jasmina Prajapati      10/6/20  2:19 PM

## 2020-10-09 RX ORDER — CYCLOBENZAPRINE HCL 10 MG
10 TABLET ORAL 3 TIMES DAILY
Qty: 30 TABLET | Refills: 0 | Status: SHIPPED | OUTPATIENT
Start: 2020-10-09 | End: 2020-10-19

## 2020-10-21 ENCOUNTER — HOSPITAL ENCOUNTER (OUTPATIENT)
Dept: PHYSICAL THERAPY | Age: 65
Setting detail: THERAPIES SERIES
Discharge: HOME | End: 2020-10-21
Payer: COMMERCIAL

## 2020-10-21 PROCEDURE — 97162 PT EVAL MOD COMPLEX 30 MIN: CPT

## 2020-10-21 ASSESSMENT — PAIN SCALES - GENERAL: PAINLEVEL_OUTOF10: 0

## 2020-10-21 ASSESSMENT — PAIN DESCRIPTION - DESCRIPTORS: DESCRIPTORS: DULL;DISCOMFORT

## 2020-10-21 ASSESSMENT — PAIN DESCRIPTION - FREQUENCY: FREQUENCY: INTERMITTENT

## 2020-10-21 ASSESSMENT — PAIN DESCRIPTION - PAIN TYPE: TYPE: SURGICAL PAIN

## 2020-10-21 ASSESSMENT — PAIN DESCRIPTION - ORIENTATION: ORIENTATION: LOWER

## 2020-10-21 ASSESSMENT — PAIN DESCRIPTION - PROGRESSION: CLINICAL_PROGRESSION: GRADUALLY IMPROVING

## 2020-10-21 ASSESSMENT — PAIN DESCRIPTION - LOCATION: LOCATION: BACK

## 2020-10-21 NOTE — PROGRESS NOTES
yes  [x] no   Long term goal 4: The pt will demo improved lumboeplvic mobility and LE flexibility with PROM SLR >/=80 and (-) B elys PROM RLE (degrees)  RLE General PROM: SLR 68     PROM LLE (degrees)  LLE General PROM: SLR 60    (+) B elys  [] yes  [x] no     Body structures, Functions, Activity limitations: Decreased functional mobility , Decreased ADL status, Decreased ROM, Decreased strength, Increased pain, Decreased posture, Decreased high-level IADLs  Assessment: Pt presents s/p lumbar fusion 9/28/20 with decreased lumbopelvic ROM, decreased LE flexibility, decreased core/hip strength, decreased functional activity tolerance, decreased postural awareness, and increased p/o LBP. These impairments currently limit his functional abilities to stand/ambulate prolonged periods, lift, carry, perform work duties, and recreational activities at [employer]. Pt would benefit from further PT in order to address and return to PLOF to improve overall QOL. Specific instructions for Next Treatment: provide HEP  Prognosis: Excellent  Discharge Recommendations: Continue to assess pending progress    PT Education: Goals;PT Role;Plan of Care  Patient Education: Insurance authorization requirements    PLAN: [x] Evaluate and Treat  Frequency/Duration:  Plan  Times per week: 2  Plan weeks: 6 (pending insurance auth)  Specific instructions for Next Treatment: provide HEP  Current Treatment Recommendations: Strengthening, ROM, Gait Training, Manual Therapy - Soft Tissue Mobilization, Manual Therapy - Joint Manipulation, Home Exercise Program, Patient/Caregiver Education & Training, Equipment Evaluation, Education, & procurement, Modalities, Positioning     Precautions:  Spinal fusion, no bending, lifting >20#, twisting, no standing >1 hour per pt                        Patient Status:[x] Continue/ Initiate plan of Care    [] Discharge PT. Recommend pt continue with HEP.      [] Additional visits requested, Please re-certify for additional visits:        Signature: Electronically signed by Ambrosio Thomas PT on 10/21/20 at 9:15 AM EDT    If you have any questions or concerns, please don't hesitate to call. Thank you for your referral.    I have reviewed this plan of care and certify a need for medically necessary rehabilitation services.     Physician Signature:__________________________________________________________  Date:  Please sign and return

## 2020-10-21 NOTE — PROGRESS NOTES
[address]  PHYSICAL THERAPY EVALUATION    Date: 10/21/2020  Patient Name: Frieda Aguilar       MRN: [id number]   Account: [de-identified]   : 1955  (63 y.o.)   Gender: male   Referring Practitioner: Max Good                 Diagnosis: Pseudoarthrosis of lumbar spine, Lumbar radiculopathy, Lumbar spondylosis  Treatment Diagnosis: decreased lumbopelvic ROM, decreased LE flexibility, decreased core/hip strength, decreased functional activity tolerance, decreased postural awareness, and increased p/o LBP     Past Medical History:  has a past medical history of Anemia, CHF (congestive heart failure) ([facility] [address] Depression, Essential hypertension, Fatty liver, GERD (gastroesophageal reflux disease), Hepatitis C without hepatic coma, Hepatitis C without hepatic coma, Hyperlipidemia, Hypertension, Insomnia, Osteoarthritis, Osteoarthritis of spine with radiculopathy, lumbar region, Prostate cancer ([facility] [address] and Prostate cancer ([facility] [address] Past Surgical History:   has a past surgical history that includes Knee arthroscopy (Bilateral, ); Rotator cuff repair (Right, ); Carpal tunnel release (Bilateral, ); Total knee arthroplasty (Right, 11-3-14); Colonoscopy (3-2014-PROBABLY  not14); lumbar laminectomy (16); pr total knee arthroplasty (Left, 2018); Endoscopy, colon, diagnostic; Ultrasound Prostate/Transrectal (N/A, 3/20/2019); Colonoscopy (N/A, 2019); Spine surgery; Carpal tunnel release; knee surgery (Left); shoulder surgery; Prostatectomy (2020); joint replacement (); joint replacement (); joint replacement; Tonsillectomy (); back surgery (); back surgery (); and lumbar fusion (N/A, 2020).     Vital Signs  Patient Currently in Pain: Yes   Pain Screening  Patient Currently in Pain: Yes  Pain Assessment  Pain Assessment: 0-10  Pain Level: 0(Pain ranges from 0-5/10)  Pain Type: Surgical pain  Pain Location: Back  Pain Orientation: Lower  Pain mild thoracolumbar paraspinal mm tightness (-) TTP throughout  Observation: forward flexed posture, flattened lumbar lordosis, slight lateral shift to the left  Scar: midline thoracolumbar region intact and healing minimal scabbing at distal end and edema in surrounding area  Bed mobility  Bridging: Modified independent   Rolling to Left: Modified independent  Rolling to Right: Modified independent  Supine to Sit: Modified independent  Sit to Supine: Modified independent  Comment: increased time/effort required to perform    Additional Measures  Flexibility: (+) B alyce  Special Tests: (-) B JASKARAN MASCORRO     Exercises:   Exercises  Exercise 1: SF/NS for functional strengthening*  Exercise 2: TA marty*  Exercise 3: Bridge marty*  Exercise 4: H/L hip abd/add*  Exercise 5: DLS*  Exercise 6: SLR with TA*  Exercise 7: S/L hip abd*  Exercise 8: HS stretch*  Exercise 9: Hip flexor stretch*  Exercise 10: sink ex*  Exercise 12: *no bending, liftin>20#, twisting*  Modalities:  Modalities  Cryotherapy (Minutes\Location): PRN*  E-stim (parameters): PRN*  Manual:  Manual therapy  Soft Tissue Mobalization: gentle thoracolumbar paraspinals PRN*  *Indicates exercise,modality, or manual techniques to be initiated when appropriate  Assessment: Body structures, Functions, Activity limitations: Decreased functional mobility , Decreased ADL status, Decreased ROM, Decreased strength, Increased pain, Decreased posture, Decreased high-level IADLs  Assessment: Pt presents s/p lumbar fusion 9/28/20 with decreased lumbopelvic ROM, decreased LE flexibility, decreased core/hip strength, decreased functional activity tolerance, decreased postural awareness, and increased p/o LBP. These impairments currently limit his functional abilities to stand/ambulate prolonged periods, lift, carry, perform work duties, and recreational activities at [employer]. Pt would benefit from further PT in order to address and return to PLOF to improve overall QOL.   Specific instructions for Next Treatment: provide HEP  Prognosis: Excellent  Discharge Recommendations: Continue to assess pending progress  Activity Tolerance: Patient Tolerated treatment well  Activity Tolerance: Tolerated evaluation without incidence     Decision Making: Medium Complexity  History: high  Exam: high; TINO 7/50  Clinical Presentation: med      Plan  Frequency/Duration:  Plan  Times per week: 2  Plan weeks: 6 (pending insurance auth)  Specific instructions for Next Treatment: provide HEP  Current Treatment Recommendations: Strengthening, ROM, Gait Training, Manual Therapy - Soft Tissue Mobilization, Manual Therapy - Joint Manipulation, Home Exercise Program, Patient/Caregiver Education & Training, Equipment Evaluation, Education, & procurement, Modalities, Positioning     Patient Education  New Education Provided: PT Education: Goals;PT Role;Plan of Care  Patient Education: Insurance authorization requirements    POST-PAIN     Pain Rating (0-10 pain scale):   0/10  Location and pain description same as pre-treatment unless indicated. Action: [] NA  [] Call Physician  [x] Perform HEP  [] Meds as prescribed    Evaluation and patient rights have been reviewed and patient agrees with plan of care. Yes  [x]  No  []   Explain:     Miriam Fall Risk Assessment  Risk Factor Scale  Score   History of Falls [] Yes  [x] No 25  0    Secondary Diagnosis [] Yes  [x] No 15  0    Ambulatory Aid [] Furniture  [] Crutches/cane/walker  [x] None/bedrest/wheelchair/nurse 30  15  0    IV/Heparin Lock [] Yes  [x] No 20  0    Gait/Transferring [] Impaired  [] Weak  [x] Normal/bedrest/immobile 20  10  0    Mental Status [] Forgets limitations  [x] Oriented to own ability 15  0       Total: 0     Based on the Assessment score: check the appropriate box.   [x]  No intervention needed   Low =   Score of 0-24  []  Use standard prevention interventions Moderate =  Score of 24-44   [] Discuss fall prevention strategies   [] Indicate moderate falls risk on eval  []  Use high risk prevention interventions High = Score of 45 and higher   [] Discuss fall prevention strategies   [] Provide supervision during treatment time    Goals  Short term goals  Time Frame for Short term goals: 2-3 weeks  Short term goal 1: Pt will report LBP </= 1-2/10 at worst in order to increase ADL tolerance  Short term goal 2: Pt will demo improved upright postural awareness with </=25% VC's throughout tx  Long term goals  Time Frame for Long term goals : 6 weeks  Long term goal 1: Pt will demo improved B LE strength 5/5 and core strength >/=good in order to increase standing and ambulation tolerance  Long term goal 2: Pt will be indep/compliant with HEP in order to self manage indep upon D/C  Long term goal 3: Pt will have decrease in TINO score </=2/50 points demonstrating an improvement in functional activity tolerance  Long term goal 4: The pt will demo improved lumboeplvic mobility and LE flexibility with PROM SLR >/=80 and (-) B elys    PT Individual Minutes  Time In: 5126  Time Out: 0900  Minutes: 19  Timed Code Treatment Minutes: 0 Minutes  Procedure Minutes: 19 eval      Electronically signed by Waleska Frias PT on 10/21/20 at 9:13 AM EDT

## 2020-10-26 ENCOUNTER — HOSPITAL ENCOUNTER (OUTPATIENT)
Dept: PHYSICAL THERAPY | Age: 65
Setting detail: THERAPIES SERIES
Discharge: HOME | End: 2020-10-26
Payer: COMMERCIAL

## 2020-10-26 PROCEDURE — 97110 THERAPEUTIC EXERCISES: CPT

## 2020-10-26 NOTE — PROGRESS NOTES
[id number] [address]  Outpatient Physical Therapy    Treatment Note        Date: 10/26/2020  Patient: John Garcia  : 1955  ACCT #: [de-identified]  Referring Practitioner: Paras Kilgore  Diagnosis: Pseudoarthrosis of lumbar spine, Lumbar radiculopathy, Lumbar spondylosis    Visit Information:  PT Visit Information  Onset Date: 20  PT Insurance Information: BCBS  Total # of Visits Approved: 7(85 (20 max with 2 used); only 7 authorized at this time through 20)  Total # of Visits to Date: 2  No Show: 0  Progress Note Due Date: 20  Canceled Appointment: 0  Progress Note Counter:  (10/21/20-20)    Subjective: Reports MD took him off arthritis meds for 12 weeks p/o therefore has some pain in hands though denies pain in LB. Presents in back brace. Comments: RTD 20  HEP Compliance:  [] Good [] Fair [] Poor [x] Reports not doing due to: issued this date    Vital Signs  Patient Currently in Pain: Denies   Pain Screening  Patient Currently in Pain: Denies    OBJECTIVE:   Exercises  Exercise 1: SF/NS  L1.5 x5 min for functional strengthening  Exercise 2: TA marty 5\"x10  Exercise 3: Bridge marty 5\"x10  Exercise 4: H/L hip abd/add 5\"x20  Exercise 5: DLS J16  Exercise 6: SLR with TA x10  Exercise 7: S/L hip abd x10  Exercise 8: HS stretch with strap supine 30\"x3  Exercise 9: mod chrissie stretch 30\"x3 julian  Exercise 10: sink ex*  Exercise 12: *no bending, lifting>20#, twisting*  Exercise 20: HEP: TA marty, bridge marty, Hip abd/add, SLR    Strength: [x] NT  [] MMT completed:    ROM: [x] NT  [] ROM measurements:    Modalities:  Modalities  Cryotherapy (Minutes\Location): declined     *Indicates exercise, modality, or manual techniques to be initiated when appropriate    Assessment:    Body structures, Functions, Activity limitations: Decreased functional mobility , Decreased ADL status, Decreased ROM, Decreased strength, Increased pain, Decreased posture, Decreased high-level IADLs  Assessment: therapeutic exercise changes, additions or modifications this date.     PT Individual Minutes  Time In: 5967  Time Out: 0915  Minutes: 40  Timed Code Treatment Minutes: 38 Minutes  Procedure Minutes: 0     Timed Activity Minutes Units   Ther Ex 38 3     Signature:  Electronically signed by Benigno Bailey PT on 10/26/20 at 8:57 AM EDT

## 2020-10-29 ENCOUNTER — HOSPITAL ENCOUNTER (OUTPATIENT)
Dept: PHYSICAL THERAPY | Age: 65
Setting detail: THERAPIES SERIES
Discharge: HOME | End: 2020-10-29
Payer: COMMERCIAL

## 2020-10-29 PROCEDURE — 97110 THERAPEUTIC EXERCISES: CPT

## 2020-10-29 NOTE — PROGRESS NOTES
[address] [address]  Outpatient Physical Therapy    Treatment Note        Date: 10/29/2020  Patient: Dionna Martinez  : 1955  ACCT #: [de-identified]  Referring Practitioner: Sarah Daugherty  Diagnosis: Pseudoarthrosis of lumbar spine, Lumbar radiculopathy, Lumbar spondylosis    Visit Information:  PT Visit Information  Onset Date: 20  PT Insurance Information: BCBS  Total # of Visits Approved: 3(07 (20 max with 2 used); only 7 authorized at this time through 20)  Total # of Visits to Date: 3  No Show: 0  Canceled Appointment: 0  Progress Note Counter: / (10/21/20-20)    Subjective: no pain, just some slight N&T, but getting better, I am following my restrictioons  Comments: RTD 20  HEP Compliance:  [x] Good [] Rg Grewal [] Poor [] Reports not doing due to:    Vital Signs  Patient Currently in Pain: Denies   Pain Screening  Patient Currently in Pain: Denies    OBJECTIVE:   Exercises  Exercise 1: SF/NS  L1.5 x5 min for functional strengthening  Exercise 2: TA marty 5\"x15  Exercise 3: Bridge marty 5\"x10  Exercise 4: H/L hip abd/add 5\"x20 w/ YTB and ball  Exercise 5: DLS F77  Exercise 6: SLR with TA x15, b/l  Exercise 7: S/L hip abd x15  Exercise 8: HS stretch with strap supine 30\"x3, b/l  Exercise 9: mod chrissie stretch 20\"x3 julian  Exercise 11: HS curls x 10 YTB, b/l              Strength: [x] NT  [] MMT completed:      ROM: [x] NT  [] ROM measurements:         *Indicates exercise, modality, or manual techniques to be initiated when appropriate    Assessment:         Body structures, Functions, Activity limitations: Decreased functional mobility , Decreased ADL status, Decreased ROM, Decreased strength, Increased pain, Decreased posture, Decreased high-level IADLs  Assessment: slight cramping, right HS during marty bridges, able to complete, performed HS curls w/ YTB, no cramps, good tolerance to all other exercises, no complaint of pain, just mm fatigue  Treatment Diagnosis: decreased lumbopelvic ROM, Signature:  Electronically signed by Darien Messer PTA on 10/29/20 at 9:07 AM EDT

## 2020-11-02 ENCOUNTER — HOSPITAL ENCOUNTER (OUTPATIENT)
Dept: PHYSICAL THERAPY | Age: 65
Setting detail: THERAPIES SERIES
Discharge: HOME | End: 2020-11-02
Payer: COMMERCIAL

## 2020-11-02 PROCEDURE — 97110 THERAPEUTIC EXERCISES: CPT

## 2020-11-02 NOTE — PROGRESS NOTES
[id number] [address]  Outpatient Physical Therapy    Treatment Note        Date: 2020  Patient: Elias Cancer  : 1955  ACCT #: [de-identified]  Referring Practitioner: Darleen Elmore  Diagnosis: Pseudoarthrosis of lumbar spine, Lumbar radiculopathy, Lumbar spondylosis    Visit Information:  PT Visit Information  Onset Date: 20  PT Insurance Information: BCBS  Total # of Visits Approved: 0(99 (20 max with 2 used); only 7 authorized at this time through 20)  Total # of Visits to Date: 4  No Show: 0  Canceled Appointment: 0  Progress Note Counter:  (10/21/20-20)    Subjective: no pain, sleep and ADL's are getting easier, and I have very little N&T  Comments: RTD 20  HEP Compliance:  [x] Good [] Fair [] Poor [] Reports not doing due to:    Vital Signs  Patient Currently in Pain: Denies   Pain Screening  Patient Currently in Pain: Denies    OBJECTIVE:   Exercises  Exercise 1: SF, L-2.0, 5 min  Exercise 4: reverse crunches x 15 w/ ball  Exercise 5: DLS Y02, 3-way  Exercise 6: SLR with TA x15, b/l  Exercise 7: hip series x 10, b/l  Exercise 8: HS stretch 20 sec x 3, b/l, seated  Exercise 9: mod chrissie stretch 20\"x3 julian  Exercise 11: HS curls x 15 YTB, b/l  Exercise 13: DLS double arm reach x 20 w/ ball     Strength: [x] NT  [] MMT completed:      ROM: [] NT  [x] ROM measurements:  PROM RLE (degrees)  RLE General PROM: SLR 70 deg     PROM LLE (degrees)  LLE General PROM: SLR 65 deg        *Indicates exercise, modality, or manual techniques to be initiated when appropriate    Assessment:         Body structures, Functions, Activity limitations: Decreased functional mobility , Decreased ADL status, Decreased ROM, Decreased strength, Increased pain, Decreased posture, Decreased high-level IADLs  Assessment: no cramping w/ HS curls today, added, reverse crunches w/ ball, DLS double arm reach to improve core strength,  and circles with S/L abd to improve LE strength, fatigue noted with circles on S/L abd, able to complete, good tolerance to session  Treatment Diagnosis: decreased lumbopelvic ROM, decreased LE flexibility, decreased core/hip strength, decreased functional activity tolerance, decreased postural awareness, and increased p/o LBP  Prognosis: Excellent       Goals:  Short term goals  Time Frame for Short term goals: 2-3 weeks  Short term goal 1: Pt will report LBP </= 1-2/10 at worst in order to increase ADL tolerance  Short term goal 2: Pt will demo improved upright postural awareness with </=25% VC's throughout tx    Long term goals  Time Frame for Long term goals : 6 weeks  Long term goal 1: Pt will demo improved B LE strength 5/5 and core strength >/=good in order to increase standing and ambulation tolerance  Long term goal 2: Pt will be indep/compliant with HEP in order to self manage indep upon D/C  Long term goal 3: Pt will have decrease in TINO score </=2/50 points demonstrating an improvement in functional activity tolerance  Long term goal 4: The pt will demo improved lumboeplvic mobility and LE flexibility with PROM SLR >/=80 and (-) B elys  Progress toward goals:improved PROM as noted    POST-PAIN       Pain Rating (0-10 pain scale):   0/10   Location and pain description same as pre-treatment unless indicated. Action: [] NA   [] Perform HEP  [] Meds as prescribed  [] Modalities as prescribed   [] Call Physician     Frequency/Duration:  Plan  Times per week: 2  Plan weeks: 6 (pending insurance auth)  Current Treatment Recommendations: Strengthening, ROM, Gait Training, Manual Therapy - Soft Tissue Mobilization, Manual Therapy - Joint Manipulation, Home Exercise Program, Patient/Caregiver Education & Training, Equipment Evaluation, Education, & procurement, Modalities, Positioning     Pt to continue current HEP. See objective section for any therapeutic exercise changes, additions or modifications this date.          PT Individual Minutes  Time In: 1811  Time Out: 8425  Minutes:

## 2020-11-05 ENCOUNTER — HOSPITAL ENCOUNTER (OUTPATIENT)
Dept: PHYSICAL THERAPY | Age: 65
Setting detail: THERAPIES SERIES
Discharge: HOME | End: 2020-11-05
Payer: COMMERCIAL

## 2020-11-05 PROCEDURE — 97110 THERAPEUTIC EXERCISES: CPT

## 2020-11-05 NOTE — PROGRESS NOTES
[address] [address]  Outpatient Physical Therapy    Treatment Note        Date: 2020  Patient: Carlyle Friedman  : 1955  ACCT #: [de-identified]  Referring Practitioner: Jason Dolan  Diagnosis: Pseudoarthrosis of lumbar spine, Lumbar radiculopathy, Lumbar spondylosis    Visit Information:   PT Visit Information  Onset Date: 20  PT Insurance Information: BCBS  Total # of Visits Approved: 4(92 (20 max with 2 used); only 7 authorized at this time through 20)  Total # of Visits to Date: 5  No Show: 0  Canceled Appointment: 0  Progress Note Counter: / (10/21/20-20)    Subjective: Pt reports compliance with HEP. Denies radicular symptoms since LV. Comments: RTD 20  HEP Compliance:  [x] Good [] Fair [] Poor [] Reports not doing due to:    Vital Signs  Patient Currently in Pain: Denies   Pain Screening  Patient Currently in Pain: Denies    OBJECTIVE:   Exercises  Exercise 1: SF, L-2.0, 5 min  Exercise 3: Bridge with RTB around knees 5 sec x 10  Exercise 4: reverse crunches x 15 w/ ball  Exercise 5: DLS E79, opp UE/LE only  Exercise 6: SLR with TA x20, b/l  Exercise 7: hip series x 15, b/l  Exercise 8: HS stretch supine with strap 30 sec x 3, b/l  Exercise 9: mod chrissie stretch 30 sec x 3 julian  Exercise 10: Step ups 6\" F/L x15 ea  Exercise 14: PBall HS curl 5 sec x 15  Exercise 19: *no bending, lifting>20#, twisting*    *Indicates exercise, modality, or manual techniques to be initiated when appropriate    Assessment: Body structures, Functions, Activity limitations: Decreased functional mobility , Decreased ADL status, Decreased ROM, Decreased strength, Increased pain, Decreased posture, Decreased high-level IADLs  Assessment: Cues for TA iso with additon PBall HS curl with fair carryover. Pt reports some increased tightness with HS stretches this date. Also states Lt hamstring cramping with bridges. Progressed strengthening with multiple exercises with fair tolerance.   Treatment Minutes Units   Ther Ex 38 3       Signature:  Electronically signed by Tram Murphy PTA on 11/5/20 at 8:41 AM EST

## 2020-11-09 ENCOUNTER — HOSPITAL ENCOUNTER (OUTPATIENT)
Dept: PHYSICAL THERAPY | Age: 65
Setting detail: THERAPIES SERIES
Discharge: HOME | End: 2020-11-09
Payer: COMMERCIAL

## 2020-11-09 PROCEDURE — 97110 THERAPEUTIC EXERCISES: CPT

## 2020-11-09 NOTE — PROGRESS NOTES
[address] [address]  Outpatient Physical Therapy    Treatment Note        Date: 2020  Patient: Frieda Aguilar  : 1955  ACCT #: [de-identified]  Referring Practitioner: Max Good  Diagnosis: Pseudoarthrosis of lumbar spine, Lumbar radiculopathy, Lumbar spondylosis    Visit Information:  PT Visit Information  Onset Date: 20  PT Insurance Information: BCBS  Total # of Visits Approved: 5(53 (20 max with 2 used); only 7 authorized at this time through 20)  Total # of Visits to Date: 5  No Show: 0  Canceled Appointment: 0  Progress Note Counter: / (10/21/20-20)    Subjective: no pain today, and I've been doing my HEP  Comments: RTD 20  HEP Compliance:  [x] Good [] Fair [] Poor [] Reports not doing due to:    Vital Signs  Patient Currently in Pain: Denies   Pain Screening  Patient Currently in Pain: Denies    OBJECTIVE:   Exercises  Exercise 1: SF, L-3.0, 5 min  Exercise 3: Bridge with RTB around knees 5 sec x 15  Exercise 4: reverse crunches x 15 w/ ball  Exercise 5: DLS Q81, opp UE/LE only  Exercise 6: SLR with TA x20, b/l  Exercise 7: hip series x 15, b/l  Exercise 8: HS stretch supine with strap 30 sec x 3, b/l  Exercise 9: mod chrissie stretch 30 sec x 3 julian  Exercise 14: PBall HS curl 5 sec x 15  Exercise 15: retro TM, 5 min, 0.8 mph       Strength: [] NT  [x] MMT completed:  Strength RLE  Comment: SLR 4+/5  Strength LLE  Comment: SLR 4/5      ROM: [x] NT  [] ROM measurements:        *Indicates exercise, modality, or manual techniques to be initiated when appropriate    Assessment:         Body structures, Functions, Activity limitations: Decreased functional mobility , Decreased ADL status, Decreased ROM, Decreased strength, Increased pain, Decreased posture, Decreased high-level IADLs  Assessment: increased UBE, bridges and added, retro TM to improve LE strength, good tolerance to all  Treatment Diagnosis: decreased lumbopelvic ROM, decreased LE flexibility, decreased core/hip strength, decreased functional activity tolerance, decreased postural awareness, and increased p/o LBP          Goals:  Short term goals  Time Frame for Short term goals: 2-3 weeks  Short term goal 1: Pt will report LBP </= 1-2/10 at worst in order to increase ADL tolerance  Short term goal 2: Pt will demo improved upright postural awareness with </=25% VC's throughout tx    Long term goals  Time Frame for Long term goals : 6 weeks  Long term goal 1: Pt will demo improved B LE strength 5/5 and core strength >/=good in order to increase standing and ambulation tolerance  Long term goal 2: Pt will be indep/compliant with HEP in order to self manage indep upon D/C  Long term goal 3: Pt will have decrease in TINO score </=2/50 points demonstrating an improvement in functional activity tolerance  Long term goal 4: The pt will demo improved lumboeplvic mobility and LE flexibility with PROM SLR >/=80 and (-) B elys  Progress toward goals:improved strength as noted    POST-PAIN       Pain Rating (0-10 pain scale):  0 /10   Location and pain description same as pre-treatment unless indicated. Action: [] NA   [] Perform HEP  [] Meds as prescribed  [] Modalities as prescribed   [] Call Physician     Frequency/Duration:  Plan  Times per week: 2  Plan weeks: 6 (pending insurance auth)  Current Treatment Recommendations: Strengthening, ROM, Gait Training, Manual Therapy - Soft Tissue Mobilization, Manual Therapy - Joint Manipulation, Home Exercise Program, Patient/Caregiver Education & Training, Equipment Evaluation, Education, & procurement, Modalities, Positioning     Pt to continue current HEP. See objective section for any therapeutic exercise changes, additions or modifications this date.          PT Individual Minutes  Time In: 3716  Time Out: 1764  Minutes: 38  Timed Code Treatment Minutes: 38 Minutes  Procedure Minutes:0     Timed Activity Minutes Units   Ther Ex 38 3       Signature:  Electronically signed by Kezia Valera Alfredo Chinchilla, PTA on 11/9/20 at 9:12 AM EST

## 2020-11-12 ENCOUNTER — HOSPITAL ENCOUNTER (OUTPATIENT)
Dept: PHYSICAL THERAPY | Age: 65
Setting detail: THERAPIES SERIES
Discharge: HOME | End: 2020-11-12
Payer: COMMERCIAL

## 2020-11-12 ENCOUNTER — HOSPITAL ENCOUNTER (OUTPATIENT)
Dept: GENERAL RADIOLOGY | Age: 65
Discharge: HOME | End: 2020-11-14
Payer: COMMERCIAL

## 2020-11-12 PROCEDURE — 72110 X-RAY EXAM L-2 SPINE 4/>VWS: CPT

## 2020-11-12 PROCEDURE — 97110 THERAPEUTIC EXERCISES: CPT

## 2020-11-12 NOTE — PROGRESS NOTES
[address] [address]  Outpatient Physical Therapy    Treatment Note        Date: 2020  Patient: Kerri Rollins  : 1955  ACCT #: [de-identified]  Referring Practitioner: William Steel  Diagnosis: Pseudoarthrosis of lumbar spine, Lumbar radiculopathy, Lumbar spondylosis    Visit Information:  PT Visit Information  Onset Date: 20  PT Insurance Information: BCBS  Total # of Visits Approved: 5(51 (20 max with 2 used); only 7 authorized at this time through 20)  Total # of Visits to Date: 6  No Show: 0  Canceled Appointment: 0  Progress Note Counter:  (10/21/20-20)    Subjective: no pain today, I feel about 75% functional  Comments: D/C on NV, RTD 20  HEP Compliance:  [x] Good [] Fair [] Poor [] Reports not doing due to:    Vital Signs  Patient Currently in Pain: Denies   Pain Screening  Patient Currently in Pain: Denies    OBJECTIVE:   Exercises  Exercise 1: SF, L-3.0, 5 min  Exercise 2: Ab walkouts x 15  Exercise 3: Bridge with RTB around knees 5 sec x 15  Exercise 4: reverse crunches x 20 w/ ball  Exercise 5: DLS D42, opp UE/LE only  Exercise 6: SLR with TA x20, b/l  Exercise 7: hip series x 10, 4-way, b/l, added SKTC  Exercise 8: HS stretch supine with strap 30 sec x 3, b/l  Exercise 9: mod chrissie stretch 30 sec x 3 julian  Exercise 14: PBall HS curl 5 sec x 15  Exercise 15: retro TM, 5 min, 1.0 mph            Strength: [x] NT  [] MMT completed:        ROM: [] NT  [x] ROM measurements:  PROM RLE (degrees)  RLE General PROM: SLR 71 deg     PROM LLE (degrees)  LLE General PROM: SLR 63 deg      *Indicates exercise, modality, or manual techniques to be initiated when appropriate    Assessment:         Body structures, Functions, Activity limitations: Decreased functional mobility , Decreased ADL status, Decreased ROM, Decreased strength, Increased pain, Decreased posture, Decreased high-level IADLs  Assessment: able to increase speed on TM today, added, Abd walkouts and SKTC w/ hip series, no c/o pain during session, good technique displayed w/ exercises  Treatment Diagnosis: decreased lumbopelvic ROM, decreased LE flexibility, decreased core/hip strength, decreased functional activity tolerance, decreased postural awareness, and increased p/o LBP          Goals:  Short term goals  Time Frame for Short term goals: 2-3 weeks  Short term goal 1: Pt will report LBP </= 1-2/10 at worst in order to increase ADL tolerance  Short term goal 2: Pt will demo improved upright postural awareness with </=25% VC's throughout tx    Long term goals  Time Frame for Long term goals : 6 weeks  Long term goal 1: Pt will demo improved B LE strength 5/5 and core strength >/=good in order to increase standing and ambulation tolerance  Long term goal 2: Pt will be indep/compliant with HEP in order to self manage indep upon D/C  Long term goal 3: Pt will have decrease in TINO score </=2/50 points demonstrating an improvement in functional activity tolerance  Long term goal 4: The pt will demo improved lumboeplvic mobility and LE flexibility with PROM SLR >/=80 and (-) B elys  Progress toward goals:ongoing    POST-PAIN       Pain Rating (0-10 pain scale):   0/10   Location and pain description same as pre-treatment unless indicated. Action: [] NA   [] Perform HEP  [] Meds as prescribed  [] Modalities as prescribed   [] Call Physician     Frequency/Duration:  Plan  Times per week: 2  Plan weeks: 6 (pending insurance auth)  Current Treatment Recommendations: Strengthening, ROM, Gait Training, Manual Therapy - Soft Tissue Mobilization, Manual Therapy - Joint Manipulation, Home Exercise Program, Patient/Caregiver Education & Training, Equipment Evaluation, Education, & procurement, Modalities, Positioning     Pt to continue current HEP. See objective section for any therapeutic exercise changes, additions or modifications this date.          PT Individual Minutes  Time In: 2571  Time Out: 0663  Minutes: 38  Timed Code Treatment Minutes: 38 Minutes  Procedure Minutes:0     Timed Activity Minutes Units   Ther Ex 38 3       Signature:  Electronically signed by Severo Gavel, PTA on 11/12/20 at 9:11 AM EST

## 2020-11-16 ENCOUNTER — HOSPITAL ENCOUNTER (OUTPATIENT)
Dept: PHYSICAL THERAPY | Age: 65
Setting detail: THERAPIES SERIES
Discharge: HOME | End: 2020-11-16
Payer: COMMERCIAL

## 2020-11-16 ENCOUNTER — PATIENT MESSAGE (OUTPATIENT)
Dept: FAMILY MEDICINE CLINIC | Age: 65
End: 2020-11-16

## 2020-11-16 PROCEDURE — 97110 THERAPEUTIC EXERCISES: CPT

## 2020-11-16 NOTE — PROGRESS NOTES
[address] [address]  Outpatient Physical Therapy    Treatment Note        Date: 2020  Patient: Mery Gannon  : 1955  ACCT #: [de-identified]  Referring Practitioner: Tesfaye Harris  Diagnosis: Pseudoarthrosis of lumbar spine, Lumbar radiculopathy, Lumbar spondylosis    Visit Information:  PT Visit Information  Onset Date: 20  PT Insurance Information: BCBS  Total # of Visits Approved: 0(94 (20 max with 2 used); only 7 authorized at this time through 20)  Total # of Visits to Date: 7  No Show: 0  Canceled Appointment: 0  Progress Note Counter:  (10/21/20-20)    Subjective: Pt denies any pain recently. Feels ready for D/C this date. Good compliance with HEP though did not do much over the weekend. Comments: RTD 20  HEP Compliance:  [x] Good [] Fair [] Poor [] Reports not doing due to:    Vital Signs  Patient Currently in Pain: Denies   Pain Screening  Patient Currently in Pain: Denies    OBJECTIVE:   Exercises  Exercise 2: Ab walkouts x 15  Exercise 3: Bridge with RTB around knees 5 sec x 15  Exercise 4: reverse crunches x 20 w/ ball  Exercise 5: DLS A25, opp UE/LE only  Exercise 6: SLR with TA x20, b/l  Exercise 7: hip series x 10, 4-way, b/l  Exercise 8: HS stretch supine with strap 30 sec x 3, b/l  Exercise 9: mod chrissie stretch 30 sec x 3 julian  Exercise 10:  Step ups 6\" F/L x15 ea  Exercise 15: retro TM, 5 min, 1.1 mph  Exercise 16: prone hip ext x10  Exercise 17: prone HSC 5\"x15  Exercise 20: HEP: prone hip ext, prone HSC    Strength: [] NT  [x] MMT completed:  Strength RLE  Comment: 5/5 knee, ankle DF, Hip IR, ER, abd 4+/5 hip flex, ext  Strength LLE  Comment: 5/5 except hip flex, abd 4+/5 hip ext 4/5     Strength Other  Other: good  core strength with isometric abdominal testing    ROM: [] NT  [x] ROM measurements:  PROM RLE (degrees)  RLE General PROM: SLR 75 deg     PROM LLE (degrees)  LLE General PROM: SLR 65 deg     *Indicates exercise, modality, or manual techniques to be initiated when appropriate    Assessment: Body structures, Functions, Activity limitations: Decreased functional mobility , Decreased ADL status, Decreased ROM, Decreased strength, Increased pain, Decreased posture, Decreased high-level IADLs  Assessment: Pt with good progress towards goals with overall improvement in functional activity tolerance and decreased pain levels. Pt conts to have minimal deficits in functional LE strength and LE flexibility though pt is currently indep with HEP and feels able to self manage symptoms indep at this time. Treatment Diagnosis: decreased lumbopelvic ROM, decreased LE flexibility, decreased core/hip strength, decreased functional activity tolerance, decreased postural awareness, and increased p/o LBP  Prognosis: Excellent    Goals:  Short term goals  Time Frame for Short term goals: 2-3 weeks  Short term goal 1: Pt will report LBP </= 1-2/10 at worst in order to increase ADL tolerance  Short term goal 2: Pt will demo improved upright postural awareness with </=25% VC's throughout tx    Long term goals  Time Frame for Long term goals : 6 weeks  Long term goal 1: Pt will demo improved B LE strength 5/5 and core strength >/=good in order to increase standing and ambulation tolerance  Long term goal 2: Pt will be indep/compliant with HEP in order to self manage indep upon D/C  Long term goal 3: Pt will have decrease in TINO score </=2/50 points demonstrating an improvement in functional activity tolerance  Long term goal 4: The pt will demo improved lumboeplvic mobility and LE flexibility with PROM SLR >/=80 and (-) B elys  Progress toward goals: good, see D/C     POST-PAIN       Pain Rating (0-10 pain scale):   0/10   Location and pain description same as pre-treatment unless indicated. Action: [] NA   [x] Perform HEP  [] Meds as prescribed  [] Modalities as prescribed   [] Call Physician     Frequency/Duration:  Plan  Plan Comment: D/C     Pt to continue current HEP.   See

## 2020-11-16 NOTE — PROGRESS NOTES
[address] [address]     Ph: [phone]  Fax: [phone]    [] Certification  [] Recertification []  Plan of Care  [] Progress Note [x] Discharge      To:   Minor Rios      From:  Sheldon Gonzalez, PT, DPT   Patient: Shaun Hutchinson     : 1955  Diagnosis: Pseudoarthrosis of lumbar spine, Lumbar radiculopathy, Lumbar spondylosis     Date: 2020  Treatment Diagnosis: decreased lumbopelvic ROM, decreased LE flexibility, decreased core/hip strength, decreased functional activity tolerance, decreased postural awareness, and increased p/o LBP     Progress Report Period from:  10/21/2020  to 2020    Total # of Visits to Date: 7   No Show: 0    Canceled Appointment: 0     OBJECTIVE:   Short Term Goals - Time Frame for Short term goals: 2-3 weeks    Goals Current/Discharge status  Met   Short term goal 1: Pt will report LBP </= 1-2/10 at worst in order to increase ADL tolerance  0/10  [x] yes  [] no   Short term goal 2: Pt will demo improved upright postural awareness with </=25% VC's throughout tx  Improved awareness though conts to demo slight forward flexed hips  [x] yes  [] no     Long Term Goals - Time Frame for Long term goals : 6 weeks  Goals Current/ Discharge status Met   Long term goal 1: Pt will demo improved B LE strength 5/5 and core strength >/=good in order to increase standing and ambulation tolerance Strength RLE  Comment: 5/5 knee, ankle DF, Hip IR, ER, abd 4+/5 hip flex, ext  Strength LLE  Comment: 5/5 except hip flex, abd 4+/5 hip ext 4/5     Strength Other  Other: good  core strength with isometric abdominal testing [x] yes  [x] no   Long term goal 2: Pt will be indep/compliant with HEP in order to self manage indep upon D/C Indep/compliant with hEP [x] yes  [] no   Long term goal 3: Pt will have decrease in TINO score </=2/50 points demonstrating an improvement in functional activity tolerance 0/45 [x] yes  [] no   Long term goal 4: The pt will demo improved lumboeplvic mobility and LE flexibility with PROM SLR >/=80 and (-) B elys PROM RLE (degrees)  RLE General PROM: SLR 75 deg     PROM LLE (degrees)  LLE General PROM: SLR 65 deg   Contd (+) B elys though with improved tightness and good knowledge of stretches to address [x] yes  [x] no     Body structures, Functions, Activity limitations: Decreased functional mobility , Decreased ADL status, Decreased ROM, Decreased strength, Increased pain, Decreased posture, Decreased high-level IADLs  Assessment: Pt with good progress towards goals with overall improvement in functional activity tolerance and decreased pain levels. Pt conts to have minimal deficits in functional LE strength and LE flexibility though pt is currently indep with hEP and feels able to self manage symptoms indep at this time. Prognosis: Excellent    PLAN:   Plan  Plan Comment: D/C     Precautions: Spinal fusion, no bending, lifting >20#, twisting, no standing >1 hour per pt                            Patient Status:[] Continue/ Initiate plan of Care    [x] Discharge PT. Recommend pt continue with HEP. [] Additional visits requested, Please re-certify for additional visits:        Signature: Electronically signed by Benigno Bailey PT on 11/16/20 at 8:06 AM EST    If you have any questions or concerns, please don't hesitate to call. Thank you for your referral.    I have reviewed this plan of care and certify a need for medically necessary rehabilitation services.     Physician Signature:__________________________________________________________  Date:  Please sign and return

## 2020-11-18 NOTE — TELEPHONE ENCOUNTER
From: Donny Vergara  To:  Megan Quinones MD  Sent: 11/16/2020 3:17 PM EST  Subject: Test Results Question    I believe I may have been in contact with a positive covid individual. How do I get a test?

## 2020-11-20 ENCOUNTER — VIRTUAL VISIT (OUTPATIENT)
Dept: FAMILY MEDICINE CLINIC | Age: 65
End: 2020-11-20

## 2020-11-20 PROCEDURE — 99999 PR OFFICE/OUTPT VISIT,PROCEDURE ONLY: CPT | Performed by: NURSE PRACTITIONER

## 2020-11-27 ENCOUNTER — OFFICE VISIT (OUTPATIENT)
Dept: FAMILY MEDICINE CLINIC | Age: 65
End: 2020-11-27
Payer: COMMERCIAL

## 2020-11-27 VITALS
SYSTOLIC BLOOD PRESSURE: 110 MMHG | TEMPERATURE: 98.3 F | BODY MASS INDEX: 33.34 KG/M2 | HEIGHT: 68 IN | HEART RATE: 75 BPM | DIASTOLIC BLOOD PRESSURE: 82 MMHG | OXYGEN SATURATION: 98 % | RESPIRATION RATE: 16 BRPM | WEIGHT: 220 LBS

## 2020-11-27 DIAGNOSIS — E78.2 MIXED HYPERLIPIDEMIA: ICD-10-CM

## 2020-11-27 DIAGNOSIS — Z12.5 SCREENING FOR PROSTATE CANCER: ICD-10-CM

## 2020-11-27 LAB
ALBUMIN SERPL-MCNC: 4.3 G/DL (ref 3.5–4.6)
ALP BLD-CCNC: 112 U/L (ref 35–104)
ALT SERPL-CCNC: 23 U/L (ref 0–41)
ANION GAP SERPL CALCULATED.3IONS-SCNC: 11 MEQ/L (ref 9–15)
AST SERPL-CCNC: 21 U/L (ref 0–40)
BILIRUB SERPL-MCNC: 0.3 MG/DL (ref 0.2–0.7)
BUN BLDV-MCNC: 23 MG/DL (ref 8–23)
CALCIUM SERPL-MCNC: 10 MG/DL (ref 8.5–9.9)
CHLORIDE BLD-SCNC: 101 MEQ/L (ref 95–107)
CHOLESTEROL, FASTING: 144 MG/DL (ref 0–199)
CO2: 24 MEQ/L (ref 20–31)
CREAT SERPL-MCNC: 1.11 MG/DL (ref 0.7–1.2)
GFR AFRICAN AMERICAN: >60
GFR NON-AFRICAN AMERICAN: >60
GLOBULIN: 2.8 G/DL (ref 2.3–3.5)
GLUCOSE BLD-MCNC: 92 MG/DL (ref 70–99)
HDLC SERPL-MCNC: 42 MG/DL (ref 40–59)
LDL CHOLESTEROL CALCULATED: 79 MG/DL (ref 0–129)
POTASSIUM SERPL-SCNC: 4.7 MEQ/L (ref 3.4–4.9)
PROSTATE SPECIFIC ANTIGEN: 0.01 NG/ML (ref 0–5.4)
SODIUM BLD-SCNC: 136 MEQ/L (ref 135–144)
TOTAL PROTEIN: 7.1 G/DL (ref 6.3–8)
TRIGLYCERIDE, FASTING: 116 MG/DL (ref 0–150)

## 2020-11-27 PROCEDURE — 90471 IMMUNIZATION ADMIN: CPT | Performed by: FAMILY MEDICINE

## 2020-11-27 PROCEDURE — 90688 IIV4 VACCINE SPLT 0.5 ML IM: CPT | Performed by: FAMILY MEDICINE

## 2020-11-27 PROCEDURE — 99214 OFFICE O/P EST MOD 30 MIN: CPT | Performed by: FAMILY MEDICINE

## 2020-11-27 NOTE — PROGRESS NOTES
Patient: Terese Situ    YOB: 1955    Date: 11/27/20    Chief Complaint   Patient presents with    Hypertension     follow up. He had back surgery 9/28/2020. He would like to get flu vaccine today.  Hyperlipidemia     follow up       Patient Active Problem List    Diagnosis Date Noted    Lumbar spondylosis 09/28/2020    Pseudoarthrosis of lumbar spine 09/21/2020    Fatty liver     Hepatitis C without hepatic coma     Prostate cancer (HCC)     Adenomatous polyp of descending colon     Adenomatous polyp of sigmoid colon     Osteoarthritis of left knee 06/18/2018    Osteoarthritis of spine with radiculopathy, lumbar region 01/19/2016    Lumbar stenosis 12/30/2015    Lumbar degenerative disc disease 12/30/2015    Laryngitis from reflux of stomach acid 08/04/2015    CHF (congestive heart failure) (HCC) 02/04/2015    GERD (gastroesophageal reflux disease)     Osteoarthritis     Hyperlipidemia     Essential hypertension     Depression        No Known Allergies    Vitals:    11/27/20 0802   BP: 110/82   Site: Left Upper Arm   Position: Sitting   Cuff Size: Large Adult   Pulse: 75   Resp: 16   Temp: 98.3 °F (36.8 °C)   TempSrc: Oral   SpO2: 98%   Weight: 220 lb (99.8 kg)   Height: 5' 8\" (1.727 m)     Body mass index is 33.45 kg/m². PHQ Scores 9/25/2017   PHQ2 Score 0   PHQ9 Score 0     Interpretation of Total Score Depression Severity: 1-4 = Minimal depression, 5-9 = Mild depression, 10-14 = Moderate depression, 15-19 = Moderately severe depression, 20-27 = Severe depression    HPI    He is following up today on his blood pressure his gastritis and his pain. He is actually doing very well and wants a flu shot today. His back surgery went well and his only complaint is that he has to take Tylenol until February to get the back healed up. He is down to 1 Prilosec a day. He also needs a repeat ultrasound of his kidneys because of the cyst that was seen. Spent 25 minutes face-to-face with the patient discussing pertinent clinical issues, assessing pertinent labs and x-rays that have been done concerning these issues and formulating treatment protocols with clinical decision making. Review of Systems    Constitutional: Negative for fatigue, fever and sweats. HEENT: Negative for eye discharge and vision loss. Negative for ear drainage, hearing loss and nasal drainage. Respiratory: Negative for cough, dyspnea and wheezing. Cardiovascular:  Negative for chest pain, claudication and irregular heartbeat/palpitations. Gastrointestinal: Negative for abdominal pain, nausea, vomiting, constipation and diarrhea. Genitourinary: No dysuria or penile discharge. Metabolic/Endocrine: Negative for cold intolerance, heat intolerance, polydipsia and polyphagia. No unintended weight loss or gain. Neuro/Psychiatric: Negative for gait disturbance. Negative for psychiatric symptoms. Dermatologic: Negative for pruritus and rash. Musculoskeletal: positive for bone/joint symptoms. No numbness or tingling. No weakness. Hematology: Negative for bleeding and easy bruising. Immunology:  Negative for environmental allergies and food allergies. Vaccine Information Sheet, \"Influenza - Inactivated\"  given to Alexa Turner, or parent/legal guardian of  Alexa Turner and verbalized understanding. Patient responses:    Have you ever had a reaction to a flu vaccine? No  Are you able to eat eggs without adverse effects? Yes  Do you have any current illness? No  Have you ever had Guillian [place] Syndrome? No    Flu vaccine given per order. Please see immunization tab. Physical Exam    Patient's medication, allergies, past medical, surgical, social and family histories were reviewed and updated as appropriate.     PHYSICAL EXAM   General appearance: Alert oriented pleasant cooperative no acute distress wearing a back brace  Lungs: Clear to auscultation without wheezes rhonchi or rales  Heart: Regular rate and rhythm without murmurs rubs or gallops  Extremities: No rashes or edema neurologically intact with a normal gait          Assessment:   Diagnosis Orders   1. Essential hypertension     2. Mixed hyperlipidemia  Lipid, Fasting    Comprehensive Metabolic Panel   3. Need for influenza vaccination  INFLUENZA, QUADV, 3 YRS AND OLDER, IM, MDV, 0.5ML (Odean Yoder)   4. Renal cyst  US RETROPERITONEAL LIMITED   5. Screening for prostate cancer  Psa screening               Plan:  Current Outpatient Medications   Medication Sig Dispense Refill    oxybutynin (DITROPAN) 5 MG tablet Take 1 tablet by mouth 3 times daily as needed (incontinence) 90 tablet 5    hydroxychloroquine (PLAQUENIL) 200 MG tablet Take 400 mg by mouth every evening       docusate sodium (COLACE) 100 MG capsule Take 100 mg by mouth 2 times daily as needed for Constipation      traZODone (DESYREL) 50 MG tablet Take 1 tablet by mouth nightly as needed for Sleep 60 tablet 1    atorvastatin (LIPITOR) 20 MG tablet TAKE 1 TABLET BY MOUTH ONE TIME A DAY (Patient taking differently: Take 20 mg by mouth every evening TAKE 1 TABLET BY MOUTH ONE TIME A DAY) 90 tablet 3    metoprolol succinate (TOPROL XL) 100 MG extended release tablet TAKE 1 TABLET BY MOUTH ONE TIME A DAY 90 tablet 3    omeprazole (PRILOSEC) 20 MG delayed release capsule TAKE THREE CAPSULES BY MOUTH EVERY DAY FOR STOMACH (Patient taking differently: TAKE ONE CAPSULES BY MOUTH EVERY DAY FOR STOMACH) 270 capsule 1    varenicline (CHANTIX STARTING MONTH CROW) 0.5 MG X 11 & 1 MG X 42 tablet As directed 1 box 0    fluticasone (FLONASE) 50 MCG/ACT nasal spray 1 spray by Nasal route daily 1 Bottle 3     No current facility-administered medications for this visit.       Orders Placed This Encounter   Procedures    US RETROPERITONEAL LIMITED     Standing Status:   Future     Standing Expiration Date:   11/27/2021    INFLUENZA, QUADV, 3 YRS AND OLDER, IM, MDV, 0.5ML (AFLURIA QUADV)    Lipid, Fasting     Standing Status:   Future     Number of Occurrences:   1     Standing Expiration Date:   11/27/2021    Psa screening     Standing Status:   Future     Number of Occurrences:   1     Standing Expiration Date:   11/27/2021    Comprehensive Metabolic Panel     Standing Status:   Future     Number of Occurrences:   1     Standing Expiration Date:   11/27/2021       No orders of the defined types were placed in this encounter. Return in about 6 months (around 5/27/2021).     Dr. Jasmina Prajapati      11/27/20  9:01 AM

## 2020-12-04 ENCOUNTER — HOSPITAL ENCOUNTER (OUTPATIENT)
Dept: ULTRASOUND IMAGING | Age: 65
Discharge: HOME | End: 2020-12-06
Payer: COMMERCIAL

## 2020-12-04 PROCEDURE — 76775 US EXAM ABDO BACK WALL LIM: CPT

## 2021-02-26 ENCOUNTER — VIRTUAL VISIT (OUTPATIENT)
Dept: FAMILY MEDICINE CLINIC | Age: 66
End: 2021-02-26
Payer: COMMERCIAL

## 2021-02-26 DIAGNOSIS — B96.89 ACUTE BACTERIAL SINUSITIS: Primary | ICD-10-CM

## 2021-02-26 DIAGNOSIS — J01.90 ACUTE BACTERIAL SINUSITIS: Primary | ICD-10-CM

## 2021-02-26 PROCEDURE — 99441 PR PHYS/QHP TELEPHONE EVALUATION 5-10 MIN: CPT | Performed by: PHYSICIAN ASSISTANT

## 2021-02-26 RX ORDER — CELECOXIB 200 MG/1
CAPSULE ORAL
COMMUNITY
Start: 2021-02-20 | End: 2021-06-08

## 2021-02-26 RX ORDER — AMOXICILLIN AND CLAVULANATE POTASSIUM 875; 125 MG/1; MG/1
1 TABLET, FILM COATED ORAL 2 TIMES DAILY
Qty: 14 TABLET | Refills: 0 | Status: SHIPPED | OUTPATIENT
Start: 2021-02-26 | End: 2021-03-05

## 2021-02-26 ASSESSMENT — ENCOUNTER SYMPTOMS
SHORTNESS OF BREATH: 0
BACK PAIN: 0
COUGH: 0
DIARRHEA: 0
EYE ITCHING: 0
CHEST TIGHTNESS: 0
TROUBLE SWALLOWING: 0
EYE DISCHARGE: 0
ABDOMINAL PAIN: 0
SINUS PAIN: 1
SINUS PRESSURE: 1
VOMITING: 0

## 2021-02-26 ASSESSMENT — PATIENT HEALTH QUESTIONNAIRE - PHQ9
SUM OF ALL RESPONSES TO PHQ QUESTIONS 1-9: 0
1. LITTLE INTEREST OR PLEASURE IN DOING THINGS: 0

## 2021-02-26 NOTE — PROGRESS NOTES
TELEHEALTH EVALUATION -- Audio/Visual (During AEAIC-74 public health emergency)    -   Lashawn Alamo is a 63 y.o. male being evaluated by a Virtual Visit (video visit) encounter to address concerns as mentioned above. A caregiver was present when appropriate. Due to this being a TeleHealth encounter (During LCJUD-13 public health emergency), evaluation of the following organ systems was limited: Vitals/Constitutional/EENT/Resp/CV/GI//MS/Neuro/Skin/Heme-Lymph-Imm. Pursuant to the emergency declaration under the [facility], [address] [place] [address] authority and the [employer] Act, this Virtual Visit was conducted with patient's (and/or legal guardian's) consent, to reduce the patient's risk of exposure to COVID-19 and provide necessary medical care. The patient (and/or legal guardian) has also been advised to contact this office for worsening conditions or problems, and seek emergency medical treatment and/or call 911 if deemed necessary. Patient was contacted and agreed to proceed with a virtual visit via Telephone Visit  The risks and benefits of converting to a virtual visit were discussed in light of the current infectious disease epidemic. Patient also understood that insurance coverage and co-pays are up to their individual insurance plans. Patient was located at their home. Provider was located at their office. 2021  Lashawn Alamo (:  1955) has requested an audio/video evaluation for the following concern(s):    HPI 63year old male who complains of congestion of the maxillary sinus pressure with thick copious sinus drainage. Post nasal drip. States that he has had the symptoms for the past two.  It is bothersome for him to breath at night      Review of Systems   Constitutional: Negative for activity change, appetite change, chills, fever and unexpected weight change. HENT: Positive for sinus pressure and sinus pain. Negative for drooling, ear pain, nosebleeds and trouble swallowing. Eyes: Negative for discharge and itching. Respiratory: Negative for cough, chest tightness and shortness of breath. Cardiovascular: Negative for chest pain and leg swelling. Gastrointestinal: Negative for abdominal pain, diarrhea and vomiting. Endocrine: Negative for polydipsia and polyphagia. Genitourinary: Negative for dysuria, flank pain and frequency. Musculoskeletal: Negative for back pain and myalgias. Skin: Negative for pallor and rash. Neurological: Negative for syncope, weakness and headaches. Hematological: Does not bruise/bleed easily. Psychiatric/Behavioral: Negative for agitation, behavioral problems and confusion. All other systems reviewed and are negative. Prior to Visit Medications    Medication Sig Taking?  Authorizing Provider   celecoxib (CELEBREX) 200 MG capsule  Yes Historical Provider, MD   oxybutynin (DITROPAN) 5 MG tablet Take 1 tablet by mouth 3 times daily as needed (incontinence) Yes Mariangel Arango MD   hydroxychloroquine (PLAQUENIL) 200 MG tablet Take 400 mg by mouth every evening  Yes Historical Provider, MD   docusate sodium (COLACE) 100 MG capsule Take 100 mg by mouth 2 times daily as needed for Constipation Yes Historical Provider, MD   traZODone (DESYREL) 50 MG tablet Take 1 tablet by mouth nightly as needed for Sleep Yes Mariangel Arango MD   atorvastatin (LIPITOR) 20 MG tablet TAKE 1 TABLET BY MOUTH ONE TIME A DAY  Patient taking differently: Take 20 mg by mouth every evening TAKE 1 TABLET BY MOUTH ONE TIME A DAY Yes Mariangel Arango MD   metoprolol succinate (TOPROL XL) 100 MG extended release tablet TAKE 1 TABLET BY MOUTH ONE TIME A DAY Yes Mariangel Arango MD   omeprazole (PRILOSEC) 20 MG delayed release capsule TAKE THREE CAPSULES BY MOUTH EVERY DAY FOR STOMACH  Patient taking differently: TAKE ONE CAPSULES BY MOUTH EVERY DAY FOR STOMACH Yes Claudean Rosales, MD   varenicline (CHANTIX STARTING MONTH PAK) 0.5 MG X 11 & 1 MG X 42 tablet As directed Yes Claudean Rosales, MD   fluticasone (FLONASE) 50 MCG/ACT nasal spray 1 spray by Nasal route daily  Patient not taking: Reported on 2/26/2021  Meghan Ruiz MD       Past Medical History:   Diagnosis Date    Anemia     [facility]F [facility]congestive heart failure) ([facility]     Patient denies hx    Depression     Essential hypertension     Fatty liver     GERD (gastroesophageal reflux disease)     Hepatitis C without hepatic coma     Hepatitis C without hepatic coma     Hyperlipidemia     meds > 5 yrs    Hypertension     meds > 5 yrs    Insomnia     Osteoarthritis     Osteoarthritis of spine with radiculopathy, lumbar region 1/19/2016    Prostate cancer ([facility] 2019    OR -- no chemo or radiation    Prostate cancer [facility])      Past Surgical History:   Procedure Laterality Date    BACK SURGERY  2005    lumbar disc OR / fusion    BACK SURGERY  2016    fusion    CARPAL TUNNEL RELEASE Bilateral 2005    CARPAL TUNNEL RELEASE      COLONOSCOPY  3-2014-PROBABLY 08 not14    (-), 2008 POLYPS    COLONOSCOPY N/A 8/9/2019    COLORECTAL CANCER SCREENING, NOT HIGH RISK performed by Leida Smiley MD at [address] [facility] [place], DIAGNOSTIC      JOINT REPLACEMENT  2012    RTKR    JOINT REPLACEMENT  2018    LTKR    JOINT REPLACEMENT      KNEE ARTHROSCOPY Bilateral 2014    KNEE SURGERY Left     before LTKR    LUMBAR FUSION N/A 9/28/2020    REMOVAL OF HARDWARE AT L3-4 L2,L3,L4 INSTRUMENTATION L2-3 PLIF L3-4 POSTEROLATERAL FUSION performed by Kindra Carnes MD at [address]  2/24/16    DR. Li [address]     IA TOTAL KNEE ARTHROPLASTY Left 6/18/2018    LEFT KNEE TOTAL KNEE  ARTHROPLASTY, SUPINE, BLOCK PER TKA PROTOCOL, performed by Loreta Hackett MD at [address] [facility]  06/2020    [facility]    ROTATOR CUFF REPAIR Right 2006    SHOULDER SURGERY      SPINE SURGERY  TONSILLECTOMY  1964    TOTAL KNEE ARTHROPLASTY Right 11-3-14    ULTRASOUND PROSTATE/TRANSRECTAL N/A 3/20/2019    PROSTATE TRANSRECTAL ULTRASOUND BIOPSY performed by Curly Bianchi MD at [address] [facility] History     Socioeconomic History    Marital status:      Spouse name: Not on file    Number of children: Not on file    Years of education: Not on file    Highest education level: Not on file   Occupational History    Not on file   Social Needs    Financial resource strain: Not on file    Food insecurity     Worry: Not on file     Inability: Not on file    Transportation needs     Medical: Not on file     Non-medical: Not on file   Tobacco Use    Smoking status: Former Smoker     Packs/day: 0.33     Years: 42.00     Pack years: 13.86     Types: Cigarettes, Cigars     Start date:      Quit date: 3/1/2020     Years since quittin.9    Smokeless tobacco: Never Used    Tobacco comment: Smokes 5 cigars a week   Substance and Sexual Activity    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    Drug use: No    Sexual activity: Yes     Partners: Female   Lifestyle    Physical activity     Days per week: Not on file     Minutes per session: Not on file    Stress: Not on file   Relationships    Social connections     Talks on phone: Not on file     Gets together: Not on file     Attends Jain service: Not on file     Active member of club or organization: Not on file     Attends meetings of clubs or organizations: Not on file     Relationship status: Not on file    Intimate partner violence     Fear of current or ex partner: Not on file     Emotionally abused: Not on file     Physically abused: Not on file     Forced sexual activity: Not on file   Other Topics Concern    Not on file   Social History Narrative    Not on file     Family History   Problem Relation Age of Onset    Thyroid Disease Mother     Macular Degen Mother     Arthritis Mother     High Blood Pressure Father  Heart Attack Father     Arthritis Father     Heart Disease Father     Cancer Father     No Known Problems Brother     Other Brother         viral pneumonia at age 25s    No Known Problems Son      No Known Allergies    PMH, Surgical Hx, Family Hx, and Social Hx reviewed and updated. Health Maintenance reviewed. PHYSICAL EXAMINATION:  \"[x]\" Indicates a positive item  \"[]\" Indicates a negative item    Vital Signs: (As obtained by patient/caregiver or practitioner observation)    Blood pressure-  Heart rate-    Respiratory rate-    Temperature-  Pulse oximetry-     Constitutional: [x] Appears well-developed and well-nourished [x] No apparent distress      [] Abnormal-   Mental status  [x] Alert and awake  [x] Oriented to person/place/time [x]Able to follow commands      Eyes:  EOM    []  Normal  [] Abnormal-  Sclera  [x]  Normal  [] Abnormal -         Discharge [x]  None visible  [] Abnormal -    HENT:   [x] Normocephalic, atraumatic.   [] Abnormal   [x] Mouth/Throat: Mucous membranes are moist.     External Ears [x] Normal  [] Abnormal-     Neck: [x] No visualized mass     Pulmonary/Chest: [x] Respiratory effort normal.  [x] No visualized signs of difficulty breathing or respiratory distress        [] Abnormal-      Musculoskeletal:   [x] Normal gait with no signs of ataxia         [x] Normal range of motion of neck        [] Abnormal-       Neurological:       [x] No Facial Asymmetry (Cranial nerve 7 motor function) (limited exam to video visit)          [x] No gaze palsy        [] Abnormal-         Skin:        [x] No significant exanthematous lesions or discoloration noted on facial skin         [] Abnormal-            Psychiatric:       [x] Normal Affect [x] No Hallucinations        [] Abnormal-     Other pertinent observable physical exam findings-   Results for orders placed or performed in visit on 11/27/20   Comprehensive Metabolic Panel   Result Value Ref Range    Sodium 136 135 - 144 mEq/L Potassium 4.7 3.4 - 4.9 mEq/L    Chloride 101 95 - 107 mEq/L    CO2 24 20 - 31 mEq/L    Anion Gap 11 9 - 15 mEq/L    Glucose 92 70 - 99 mg/dL    BUN 23 8 - 23 mg/dL    CREATININE 1.11 0.70 - 1.20 mg/dL    GFR Non-African American >60.0 >60    GFR  >60.0 >60    Calcium 10.0 (H) 8.5 - 9.9 mg/dL    Total Protein 7.1 6.3 - 8.0 g/dL    Albumin 4.3 3.5 - 4.6 g/dL    Total Bilirubin 0.3 0.2 - 0.7 mg/dL    Alkaline Phosphatase 112 (H) 35 - 104 U/L    ALT 23 0 - 41 U/L    AST 21 0 - 40 U/L    Globulin 2.8 2.3 - 3.5 g/dL   Psa screening   Result Value Ref Range    PSA 0.01 0.00 - 5.40 ng/mL   Lipid, Fasting   Result Value Ref Range    Cholesterol, Fasting 144 0 - 199 mg/dL    Triglyceride, Fasting 116 0 - 150 mg/dL    HDL 42 40 - 59 mg/dL    LDL Calculated 79 0 - 129 mg/dL       ASSESSMENT/PLAN:  Assessment & Plan   There are no diagnoses linked to this encounter. No orders of the defined types were placed in this encounter. No orders of the defined types were placed in this encounter. There are no discontinued medications. No follow-ups on file. Reviewed with the patient: current clinical status, medications, activities and diet. Side effects, adverse effects of the medication prescribed today, as well as treatment plan/ rationale and result expectations have been discussed with the patient who expresses understanding and desires to proceed. Close follow up to evaluate treatment results and for coordination of care. I have reviewed the patient's medical history in detail and updated the computerized patient record. Patient identification was verified at the start of the visit: Yes    Total time spent on this encounter: Not billed by time      --MOUNIKA Quach on 2/26/2021 at 1:11 PM    An electronic signature was used to authenticate this note.

## 2021-05-25 ENCOUNTER — OFFICE VISIT (OUTPATIENT)
Dept: FAMILY MEDICINE CLINIC | Age: 66
End: 2021-05-25
Payer: COMMERCIAL

## 2021-05-25 VITALS
HEIGHT: 68 IN | HEART RATE: 65 BPM | OXYGEN SATURATION: 96 % | RESPIRATION RATE: 16 BRPM | SYSTOLIC BLOOD PRESSURE: 112 MMHG | TEMPERATURE: 98.3 F | WEIGHT: 206 LBS | BODY MASS INDEX: 31.22 KG/M2 | DIASTOLIC BLOOD PRESSURE: 80 MMHG

## 2021-05-25 DIAGNOSIS — E78.2 MIXED HYPERLIPIDEMIA: Primary | ICD-10-CM

## 2021-05-25 DIAGNOSIS — F17.200 SMOKER: ICD-10-CM

## 2021-05-25 DIAGNOSIS — I10 ESSENTIAL HYPERTENSION: ICD-10-CM

## 2021-05-25 DIAGNOSIS — J01.91 ACUTE RECURRENT SINUSITIS, UNSPECIFIED LOCATION: ICD-10-CM

## 2021-05-25 PROCEDURE — 99214 OFFICE O/P EST MOD 30 MIN: CPT | Performed by: FAMILY MEDICINE

## 2021-05-25 RX ORDER — VARENICLINE TARTRATE 0.5 MG/1
.5-1 TABLET, FILM COATED ORAL SEE ADMIN INSTRUCTIONS
Qty: 57 TABLET | Refills: 0 | Status: ON HOLD | OUTPATIENT
Start: 2021-05-25 | End: 2021-09-10

## 2021-05-25 RX ORDER — AMOXICILLIN AND CLAVULANATE POTASSIUM 875; 125 MG/1; MG/1
1 TABLET, FILM COATED ORAL 2 TIMES DAILY
Qty: 20 TABLET | Refills: 0 | Status: SHIPPED | OUTPATIENT
Start: 2021-05-25 | End: 2021-06-04

## 2021-05-25 SDOH — ECONOMIC STABILITY: TRANSPORTATION INSECURITY
IN THE PAST 12 MONTHS, HAS LACK OF TRANSPORTATION KEPT YOU FROM MEETINGS, WORK, OR FROM GETTING THINGS NEEDED FOR DAILY LIVING?: NO

## 2021-05-25 NOTE — PROGRESS NOTES
Patient: Timmy Bains (: 1955) is a 61 y.o. male,Established patient, here for evaluation of the following chief complaint(s):  Chief Complaint   Patient presents with    Hypertension     6 month follow up. He is due for labs. He complains of having postnasal drip and dry throat.  Hyperlipidemia     6 month follow up       Date: 21    No Known Allergies    Vitals:    21 0810   BP: 112/80   Site: Left Upper Arm   Position: Sitting   Cuff Size: Small Adult   Pulse: 65   Resp: 16   Temp: 98.3 °F (36.8 °C)   TempSrc: Oral   SpO2: 96%   Weight: 206 lb (93.4 kg)   Height: 5' 8\" (1.727 m)     Body mass index is 31.32 kg/m². PHQ Scores 2021   PHQ2 Score 0 0   PHQ9 Score 0 0     Interpretation of Total Score Depression Severity: 1-4 = Minimal depression, 5-9 = Mild depression, 10-14 = Moderate depression, 15-19 = Moderately severe depression, 20-27 = Severe depression    HPI    He is following up on his hypercholesterolemia blood pressure and reflux. His only real complaint today is that his sinusitis is back and he would like to try to stop smoking again. He is hoping to retire next year his wife can retire soon as well. He has no fever chills chest pressure cough his voice is hoarse he has postnasal drip and some facial pressure no ear pain minimal sore throat no nausea vomiting or diarrhea. Review of Systems    Constitutional: Negative for fatigue, fever and sweats. HEENT: Negative for eye discharge and vision loss. Negative for ear drainage, hearing loss and nasal drainage. Respiratory: Negative for cough, dyspnea and wheezing. Cardiovascular:  Negative for chest pain, claudication and irregular heartbeat/palpitations. Gastrointestinal: Negative for abdominal pain, nausea, vomiting, constipation and diarrhea. Genitourinary: No dysuria or penile discharge. Metabolic/Endocrine: Negative for cold intolerance, heat intolerance, polydipsia and polyphagia.  No unintended weight loss or gain. Neuro/Psychiatric: Negative for gait disturbance. Negative for psychiatric symptoms. Dermatologic: Negative for pruritus and rash. Musculoskeletal: positive for bone/joint symptoms. No numbness or tingling. No weakness. Hematology: Negative for bleeding and easy bruising. Immunology:  Negative for environmental allergies and food allergies. Physical Exam    Patient's medication, allergies, past medical, surgical, social and family histories were reviewed and updated as appropriate. PHYSICAL EXAM     General: Alert oriented pleasant cooperative no acute distress color good nonicteric  HEENT: Eyes clear nonicteric facial muscles symmetrical.  Color good ears TMs occluded by wax no tenderness to palpation of sinuses or adenopathy of the neck no carotid bruits oropharynx hyperemic no exudates indurations or ulcerations  Lungs: Clear to auscultation without wheezes rhonchi or rales  Heart: Regular rate and rhythm without murmurs rubs or gallops  Extremities: No rashes or edema. Assessment:   Diagnosis Orders   1. Mixed hyperlipidemia  Lipid, Fasting   2. Essential hypertension  Comprehensive Metabolic Panel   3. Acute recurrent sinusitis, unspecified location  amoxicillin-clavulanate (AUGMENTIN) 875-125 MG per tablet   4. Smoker  varenicline (CHANTIX) 0.5 MG tablet     Advised patient he needs to get his Pneumovax this year next year he is just completing her has completed his Covid vaccines    On this date 05/25/21 I have spent 25 minutes reviewing previous notes, test results and face to face with the patient discussing the diagnosis and importance of compliance with the treatment plan.        Plan:  Current Outpatient Medications   Medication Sig Dispense Refill    varenicline (CHANTIX) 0.5 MG tablet Take 1-2 tablets by mouth See Admin Instructions 0.5mg DAILY for 3 days followed by 0.5mg TWICE DAILY for 4 days followed by 1mg TWICE DAILY 57 tablet 0    amoxicillin-clavulanate (AUGMENTIN) 875-125 MG per tablet Take 1 tablet by mouth 2 times daily for 10 days 20 tablet 0    omeprazole (PRILOSEC) 20 MG delayed release capsule TAKE THREE CAPSULES BY MOUTH EVERY DAY FOR STOMACH 270 capsule 0    celecoxib (CELEBREX) 200 MG capsule       oxybutynin (DITROPAN) 5 MG tablet Take 1 tablet by mouth 3 times daily as needed (incontinence) 90 tablet 5    hydroxychloroquine (PLAQUENIL) 200 MG tablet Take 400 mg by mouth every evening       atorvastatin (LIPITOR) 20 MG tablet TAKE 1 TABLET BY MOUTH ONE TIME A DAY (Patient taking differently: Take 20 mg by mouth every evening TAKE 1 TABLET BY MOUTH ONE TIME A DAY) 90 tablet 3    metoprolol succinate (TOPROL XL) 100 MG extended release tablet TAKE 1 TABLET BY MOUTH ONE TIME A DAY 90 tablet 3    fluticasone (FLONASE) 50 MCG/ACT nasal spray 1 spray by Nasal route daily 1 Bottle 3    traZODone (DESYREL) 50 MG tablet Take 1 tablet by mouth nightly as needed for Sleep (Patient not taking: Reported on 5/25/2021) 60 tablet 1     No current facility-administered medications for this visit. Orders Placed This Encounter   Procedures    Lipid, Fasting     Standing Status:   Future     Standing Expiration Date:   5/25/2022    Comprehensive Metabolic Panel     Standing Status:   Future     Standing Expiration Date:   5/25/2022       Orders Placed This Encounter   Medications    varenicline (CHANTIX) 0.5 MG tablet     Sig: Take 1-2 tablets by mouth See Admin Instructions 0.5mg DAILY for 3 days followed by 0.5mg TWICE DAILY for 4 days followed by 1mg TWICE DAILY     Dispense:  57 tablet     Refill:  0    amoxicillin-clavulanate (AUGMENTIN) 875-125 MG per tablet     Sig: Take 1 tablet by mouth 2 times daily for 10 days     Dispense:  20 tablet     Refill:  0              Return in about 6 months (around 11/25/2021), or MOUNIKA Khan. An electronic signature was used to authenticate this note.   Dr. Jorge Luis Guthrie MD      5/25/21  8:36 AM

## 2021-06-08 RX ORDER — CELECOXIB 200 MG/1
CAPSULE ORAL
Qty: 180 CAPSULE | Refills: 1 | Status: SHIPPED | OUTPATIENT
Start: 2021-06-08 | End: 2021-11-30 | Stop reason: SDUPTHER

## 2021-08-05 ENCOUNTER — PRE-PROCEDURE TELEPHONE (OUTPATIENT)
Dept: GENERAL RADIOLOGY | Age: 66
End: 2021-08-05

## 2021-08-12 ENCOUNTER — HOSPITAL ENCOUNTER (OUTPATIENT)
Dept: GENERAL RADIOLOGY | Age: 66
Discharge: HOME | End: 2021-08-14
Payer: COMMERCIAL

## 2021-08-12 ENCOUNTER — HOSPITAL ENCOUNTER (OUTPATIENT)
Dept: MRI IMAGING | Age: 66
Discharge: HOME | End: 2021-08-14
Payer: COMMERCIAL

## 2021-08-12 VITALS — SYSTOLIC BLOOD PRESSURE: 139 MMHG | DIASTOLIC BLOOD PRESSURE: 91 MMHG | HEART RATE: 76 BPM

## 2021-08-12 DIAGNOSIS — S43.432A SUPERIOR GLENOID LABRUM LESION OF LEFT SHOULDER, INITIAL ENCOUNTER: ICD-10-CM

## 2021-08-12 PROCEDURE — A9579 GAD-BASE MR CONTRAST NOS,1ML: HCPCS | Performed by: NURSE PRACTITIONER

## 2021-08-12 PROCEDURE — 6360000004 HC RX CONTRAST MEDICATION: Performed by: NURSE PRACTITIONER

## 2021-08-12 PROCEDURE — 2580000003 HC RX 258: Performed by: NURSE PRACTITIONER

## 2021-08-12 PROCEDURE — 2500000003 HC RX 250 WO HCPCS: Performed by: NURSE PRACTITIONER

## 2021-08-12 PROCEDURE — 77002 NEEDLE LOCALIZATION BY XRAY: CPT

## 2021-08-12 PROCEDURE — 20610 DRAIN/INJ JOINT/BURSA W/O US: CPT

## 2021-08-12 PROCEDURE — 73222 MRI JOINT UPR EXTREM W/DYE: CPT

## 2021-08-12 PROCEDURE — 6370000000 HC RX 637 (ALT 250 FOR IP): Performed by: STUDENT IN AN ORGANIZED HEALTH CARE EDUCATION/TRAINING PROGRAM

## 2021-08-12 RX ORDER — SODIUM CHLORIDE 0.9 % (FLUSH) 0.9 %
10 SYRINGE (ML) INJECTION 2 TIMES DAILY
Status: DISCONTINUED | OUTPATIENT
Start: 2021-08-12 | End: 2021-08-15 | Stop reason: HOSPADM

## 2021-08-12 RX ORDER — SODIUM CHLORIDE 0.9 % (FLUSH) 0.9 %
12 SYRINGE (ML) INJECTION 2 TIMES DAILY
Status: CANCELLED | OUTPATIENT
Start: 2021-08-12

## 2021-08-12 RX ORDER — LIDOCAINE HYDROCHLORIDE 20 MG/ML
5 INJECTION, SOLUTION INFILTRATION; PERINEURAL ONCE
Status: CANCELLED | OUTPATIENT
Start: 2021-08-12 | End: 2021-08-12

## 2021-08-12 RX ORDER — LIDOCAINE HYDROCHLORIDE 20 MG/ML
10 INJECTION, SOLUTION INFILTRATION; PERINEURAL ONCE
Status: COMPLETED | OUTPATIENT
Start: 2021-08-12 | End: 2021-08-12

## 2021-08-12 RX ORDER — DIAZEPAM 5 MG/1
10 TABLET ORAL ONCE
Status: COMPLETED | OUTPATIENT
Start: 2021-08-12 | End: 2021-08-12

## 2021-08-12 RX ADMIN — LIDOCAINE HYDROCHLORIDE 5 ML: 20 INJECTION, SOLUTION INFILTRATION; PERINEURAL at 10:45

## 2021-08-12 RX ADMIN — IOVERSOL 6 ML: 678 INJECTION INTRA-ARTERIAL; INTRAVENOUS at 10:45

## 2021-08-12 RX ADMIN — GADOTERIDOL 0.1 MMOL: 279.3 INJECTION, SOLUTION INTRAVENOUS at 10:45

## 2021-08-12 RX ADMIN — Medication 5 ML: at 10:45

## 2021-08-12 RX ADMIN — DIAZEPAM 10 MG: 5 TABLET ORAL at 10:36

## 2021-08-12 ASSESSMENT — PAIN SCALES - GENERAL: PAINLEVEL_OUTOF10: 4

## 2021-08-12 NOTE — FLOWSHEET NOTE
Pt for arthrogram with MRI and claustrophobic. Pt states his PCP ordered oral Valium 10 mg for him to take the morning of procedure. Pt states he took the valium 10 mg PO this morning at 8am. Pt arrived for 930am procedure at 843am with his daughter who is driving pt. Due to ordering physician for appointment not in [address], procedure delayed and pt becoming increasingly anxious (aeb restless and tense) and stating he will not be able to \"get in that tube\" for the MRI portion of test. Pt requesting additional dose of valium to be able to proceed. Attempts to contact PCP unsuccessful, message left on VM at office. Radiologist to perform arthrogram, Dr. Daniel Essex, updated on situation and pt condition, VSS, and new orders received for one time dose of Valium 10mg PO to proceed with procedure/test as pt has  with him. Pt to be taken to MRI after arthrogram via stretcher. Electronically signed by Re Funez RN on 8/12/2021 at 10:44 AM

## 2021-08-27 ENCOUNTER — VIRTUAL VISIT (OUTPATIENT)
Dept: FAMILY MEDICINE CLINIC | Age: 66
End: 2021-08-27
Payer: COMMERCIAL

## 2021-08-27 DIAGNOSIS — E78.2 MIXED HYPERLIPIDEMIA: ICD-10-CM

## 2021-08-27 PROCEDURE — 99203 OFFICE O/P NEW LOW 30 MIN: CPT | Performed by: PHYSICIAN ASSISTANT

## 2021-08-27 RX ORDER — METOPROLOL SUCCINATE 100 MG/1
TABLET, EXTENDED RELEASE ORAL
Qty: 30 TABLET | Refills: 0 | Status: SHIPPED | OUTPATIENT
Start: 2021-08-27 | End: 2021-08-27 | Stop reason: SDUPTHER

## 2021-08-27 RX ORDER — ATORVASTATIN CALCIUM 20 MG/1
TABLET, FILM COATED ORAL
Qty: 30 TABLET | Refills: 0 | Status: SHIPPED | OUTPATIENT
Start: 2021-08-27 | End: 2021-08-27 | Stop reason: SDUPTHER

## 2021-08-27 RX ORDER — METOPROLOL SUCCINATE 100 MG/1
TABLET, EXTENDED RELEASE ORAL
Qty: 90 TABLET | Refills: 2 | Status: SHIPPED | OUTPATIENT
Start: 2021-08-27 | End: 2021-08-28 | Stop reason: SDUPTHER

## 2021-08-27 RX ORDER — ATORVASTATIN CALCIUM 20 MG/1
TABLET, FILM COATED ORAL
Qty: 90 TABLET | Refills: 2 | Status: SHIPPED | OUTPATIENT
Start: 2021-08-27 | End: 2021-08-28 | Stop reason: SDUPTHER

## 2021-08-27 ASSESSMENT — ENCOUNTER SYMPTOMS
SORE THROAT: 0
DIARRHEA: 0
ALLERGIC/IMMUNOLOGIC NEGATIVE: 1
COUGH: 0
ABDOMINAL PAIN: 0
BACK PAIN: 0
EYE DISCHARGE: 0
SINUS PRESSURE: 0
CHEST TIGHTNESS: 0
WHEEZING: 0
VOMITING: 0
EYE PAIN: 0
CONSTIPATION: 0
NAUSEA: 0
SINUS PAIN: 0
SHORTNESS OF BREATH: 0

## 2021-08-27 NOTE — PROGRESS NOTES
Subjective  Mery Gannon, 61 y.o. male presents today with:  Chief Complaint   Patient presents with    Hypertension     follow up    Hyperlipidemia     follow up    Medication Refill     Needs short term supply to drug store and 90 day supply to mail away. HPI  Telemedicine telephone visit due to concern for exposure to COVID-19 (coronavirus). Patient visit to establish care past medical history significant for hypertension hyperlipidemia patient has been out of his medications for over a week blood pressure yesterday 170/115 complains of some mild lightheadedness denies chest pain shortness of breath  Of note patient has Workmen's Comp. case for injury to the left shoulder MRI on file shows advanced arthritis and possible tear of the rotator cuff  Review of Systems   Constitutional: Negative for chills, fatigue and fever. HENT: Negative for congestion, ear discharge, ear pain, sinus pressure, sinus pain and sore throat. Eyes: Negative for pain and discharge. Respiratory: Negative for cough, chest tightness, shortness of breath and wheezing. Cardiovascular: Negative for chest pain and leg swelling. Gastrointestinal: Negative for abdominal pain, constipation, diarrhea, nausea and vomiting. Endocrine: Negative. Genitourinary: Negative for difficulty urinating, dysuria, frequency and urgency. Musculoskeletal: Negative for back pain and neck pain. Skin: Negative for rash. Allergic/Immunologic: Negative. Neurological: Negative for dizziness and headaches. Hematological: Negative. Psychiatric/Behavioral: Negative.           Past Medical History:   Diagnosis Date    Anemia     CHF (congestive heart failure) ([facility] [address]     Patient denies hx    Depression     Essential hypertension     Fatty liver     GERD (gastroesophageal reflux disease)     Hepatitis C without hepatic coma     Hepatitis C without hepatic coma     Hyperlipidemia     meds > 5 yrs    Hypertension     meds > 5 yrs    Insomnia     Osteoarthritis     Osteoarthritis of spine with radiculopathy, lumbar region 1/19/2016    Prostate cancer ([facility] [address] 2019    OR -- no chemo or radiation    Prostate cancer [facility])      Past Surgical History:   Procedure Laterality Date    BACK SURGERY  2005    lumbar disc OR / fusion    BACK SURGERY  2016    fusion    CARPAL TUNNEL RELEASE Bilateral 2005    CARPAL TUNNEL RELEASE      COLONOSCOPY  3-2014-PROBABLY 08 not14    (-), 2008 POLYPS    COLONOSCOPY N/A 8/9/2019    COLORECTAL CANCER SCREENING, NOT HIGH RISK performed by Leodan Vasquez MD at [address] [facility], COLON, DIAGNOSTIC      JOINT REPLACEMENT  2012    RTKR    JOINT REPLACEMENT  2018    LTKR    JOINT REPLACEMENT      KNEE ARTHROSCOPY Bilateral 2014    KNEE SURGERY Left     before LTKR    LUMBAR FUSION N/A 9/28/2020    REMOVAL OF HARDWARE AT L3-4 L2,L3,L4 INSTRUMENTATION L2-3 PLIF L3-4 POSTEROLATERAL FUSION performed by Kyara Grey MD at [address] [facility]  2/24/16     [facility] [place] TOTAL KNEE ARTHROPLASTY Left 6/18/2018    LEFT KNEE TOTAL KNEE  ARTHROPLASTY, SUPINE, BLOCK PER TKA PROTOCOL, performed by Pollo Barbosa MD at [address] [facility]  06/2020    [facility]    ROTATOR CUFF REPAIR Right 2006    SHOULDER SURGERY      SPINE SURGERY      TONSILLECTOMY  1964    TOTAL KNEE ARTHROPLASTY Right 11-3-14    ULTRASOUND PROSTATE/TRANSRECTAL N/A 3/20/2019    PROSTATE TRANSRECTAL ULTRASOUND BIOPSY performed by Nafisa Medina MD at [address] [place] [address] History     Socioeconomic History    Marital status:      Spouse name: Not on file    Number of children: Not on file    Years of education: Not on file    Highest education level: Not on file   Occupational History    Not on file   Tobacco Use    Smoking status: Current Every Day Smoker     Packs/day: 0.33     Years: 42.00     Pack years: 13.86     Types: Cigarettes, Cigars     Start date: 28     Last attempt to quit: 3/1/2020     Years since quittin.4    Smokeless tobacco: Never Used    Tobacco comment: Smokes 5 cigars a week   Vaping Use    Vaping Use: Never used   Substance and Sexual Activity    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    Drug use: No    Sexual activity: Yes     Partners: Female   Other Topics Concern    Not on file   Social History Narrative    Not on file     Social Determinants of Health     Financial Resource Strain: Low Risk     Difficulty of Paying Living Expenses: Not hard at all   Food Insecurity: No Food Insecurity    Worried About Running Out of Food in the Last Year: Never true    Abdulkadir of Food in the Last Year: Never true   Transportation Needs: No Transportation Needs    Lack of Transportation (Medical): No    Lack of Transportation (Non-Medical):  No   Physical Activity:     Days of Exercise per Week:     Minutes of Exercise per Session:    Stress:     Feeling of Stress :    Social Connections:     Frequency of Communication with Friends and Family:     Frequency of Social Gatherings with Friends and Family:     Attends Jainism Services:     Active Member of Clubs or Organizations:     Attends Club or Organization Meetings:     Marital Status:    Intimate Partner Violence:     Fear of Current or Ex-Partner:     Emotionally Abused:     Physically Abused:     Sexually Abused:      Family History   Problem Relation Age of Onset    Thyroid Disease Mother     Macular Degen Mother     Arthritis Mother     High Blood Pressure Father     Heart Attack Father     Arthritis Father     Heart Disease Father     Cancer Father     No Known Problems Brother     Other Brother         viral pneumonia at age 25s    No Known Problems Son       No Known Allergies  Current Outpatient Medications   Medication Sig Dispense Refill    metoprolol succinate (TOPROL XL) 100 MG extended release tablet One tablet daily 90 tablet 2    atorvastatin (LIPITOR) 20 MG tablet TAKE 1 TABLET BY MOUTH ONE TIME A DAY 90 tablet 2    celecoxib (CELEBREX) 200 MG capsule TAKE 1 CAPSULE BY MOUTH 2 TIMES A  capsule 1    omeprazole (PRILOSEC) 20 MG delayed release capsule TAKE THREE CAPSULES BY MOUTH EVERY DAY FOR STOMACH 270 capsule 0    oxybutynin (DITROPAN) 5 MG tablet Take 1 tablet by mouth 3 times daily as needed (incontinence) 90 tablet 5    hydroxychloroquine (PLAQUENIL) 200 MG tablet Take 400 mg by mouth every evening       traZODone (DESYREL) 50 MG tablet Take 1 tablet by mouth nightly as needed for Sleep 60 tablet 1    fluticasone (FLONASE) 50 MCG/ACT nasal spray 1 spray by Nasal route daily 1 Bottle 3    varenicline (CHANTIX) 0.5 MG tablet Take 1-2 tablets by mouth See Admin Instructions 0.5mg DAILY for 3 days followed by 0.5mg TWICE DAILY for 4 days followed by 1mg TWICE DAILY (Patient not taking: Reported on 8/27/2021) 57 tablet 0     No current facility-administered medications for this visit. Objective    There were no vitals filed for this visit. Physical Exam  Pulmonary:      Effort: Pulmonary effort is normal. No respiratory distress. Breath sounds: No wheezing. Skin:     General: Skin is dry. Neurological:      Mental Status: He is oriented to person, place, and time. Psychiatric:         Mood and Affect: Mood normal.         Thought Content: Thought content normal.         Judgment: Judgment normal.              Assessment & Plan    Diagnosis Orders   1.  Mixed hyperlipidemia  CBC Auto Differential    Comprehensive Metabolic Panel    Lipid, Fasting    PSA Screening    atorvastatin (LIPITOR) 20 MG tablet    DISCONTINUED: atorvastatin (LIPITOR) 20 MG tablet         Orders Placed This Encounter   Procedures    CBC Auto Differential     Standing Status:   Future     Standing Expiration Date:   8/27/2022    Comprehensive Metabolic Panel     Standing Status:   Future     Standing Expiration Date:   8/27/2022    Lipid, Fasting     Standing Status: Future     Standing Expiration Date:   8/27/2022    PSA Screening     Standing Status:   Future     Standing Expiration Date:   8/27/2022     Orders Placed This Encounter   Medications    DISCONTD: metoprolol succinate (TOPROL XL) 100 MG extended release tablet     Sig: One tablet daily     Dispense:  30 tablet     Refill:  0    DISCONTD: atorvastatin (LIPITOR) 20 MG tablet     Sig: TAKE 1 TABLET BY MOUTH ONE TIME A DAY     Dispense:  30 tablet     Refill:  0    metoprolol succinate (TOPROL XL) 100 MG extended release tablet     Sig: One tablet daily     Dispense:  90 tablet     Refill:  2    atorvastatin (LIPITOR) 20 MG tablet     Sig: TAKE 1 TABLET BY MOUTH ONE TIME A DAY     Dispense:  90 tablet     Refill:  2     Medications Discontinued During This Encounter   Medication Reason    metoprolol succinate (TOPROL XL) 100 MG extended release tablet REORDER    atorvastatin (LIPITOR) 20 MG tablet REORDER    metoprolol succinate (TOPROL XL) 100 MG extended release tablet REORDER    atorvastatin (LIPITOR) 20 MG tablet REORDER     No follow-ups on file.     Rosey Abreu PA-C

## 2021-08-28 DIAGNOSIS — E78.2 MIXED HYPERLIPIDEMIA: ICD-10-CM

## 2021-08-28 RX ORDER — ATORVASTATIN CALCIUM 20 MG/1
TABLET, FILM COATED ORAL
Qty: 30 TABLET | Refills: 0 | OUTPATIENT
Start: 2021-08-28

## 2021-08-28 RX ORDER — ATORVASTATIN CALCIUM 20 MG/1
TABLET, FILM COATED ORAL
Qty: 30 TABLET | Refills: 0 | Status: SHIPPED | OUTPATIENT
Start: 2021-08-28 | End: 2022-03-14 | Stop reason: SDUPTHER

## 2021-08-28 RX ORDER — METOPROLOL SUCCINATE 100 MG/1
TABLET, EXTENDED RELEASE ORAL
Qty: 30 TABLET | Refills: 0 | Status: SHIPPED | OUTPATIENT
Start: 2021-08-28 | End: 2022-03-14 | Stop reason: SDUPTHER

## 2021-08-28 RX ORDER — METOPROLOL SUCCINATE 100 MG/1
TABLET, EXTENDED RELEASE ORAL
Qty: 30 TABLET | Refills: 0 | OUTPATIENT
Start: 2021-08-28

## 2021-09-10 ENCOUNTER — HOSPITAL ENCOUNTER (OUTPATIENT)
Age: 66
Setting detail: OUTPATIENT SURGERY
Discharge: HOME | End: 2021-09-10
Attending: ORTHOPAEDIC SURGERY | Admitting: ORTHOPAEDIC SURGERY
Payer: COMMERCIAL

## 2021-09-10 ENCOUNTER — HOSPITAL ENCOUNTER (OUTPATIENT)
Dept: GENERAL RADIOLOGY | Age: 66
Discharge: HOME | End: 2021-09-12
Attending: ORTHOPAEDIC SURGERY
Payer: COMMERCIAL

## 2021-09-10 VITALS
DIASTOLIC BLOOD PRESSURE: 92 MMHG | BODY MASS INDEX: 28.73 KG/M2 | HEART RATE: 68 BPM | OXYGEN SATURATION: 98 % | TEMPERATURE: 97.5 F | SYSTOLIC BLOOD PRESSURE: 152 MMHG | HEIGHT: 69 IN | WEIGHT: 194 LBS

## 2021-09-10 DIAGNOSIS — R52 PAIN: ICD-10-CM

## 2021-09-10 PROCEDURE — 6360000002 HC RX W HCPCS: Performed by: ORTHOPAEDIC SURGERY

## 2021-09-10 PROCEDURE — 2500000003 HC RX 250 WO HCPCS: Performed by: ORTHOPAEDIC SURGERY

## 2021-09-10 PROCEDURE — 3600000004 HC SURGERY LEVEL 4 BASE: Performed by: ORTHOPAEDIC SURGERY

## 2021-09-10 PROCEDURE — 3600000014 HC SURGERY LEVEL 4 ADDTL 15MIN: Performed by: ORTHOPAEDIC SURGERY

## 2021-09-10 PROCEDURE — 2709999900 HC NON-CHARGEABLE SUPPLY: Performed by: ORTHOPAEDIC SURGERY

## 2021-09-10 PROCEDURE — 3209999900 FLUORO FOR SURGICAL PROCEDURES

## 2021-09-10 RX ORDER — LIDOCAINE HYDROCHLORIDE AND EPINEPHRINE 10; 10 MG/ML; UG/ML
INJECTION, SOLUTION INFILTRATION; PERINEURAL PRN
Status: DISCONTINUED | OUTPATIENT
Start: 2021-09-10 | End: 2021-09-10 | Stop reason: ALTCHOICE

## 2021-09-10 RX ORDER — BUPIVACAINE HYDROCHLORIDE 2.5 MG/ML
INJECTION, SOLUTION EPIDURAL; INFILTRATION; INTRACAUDAL PRN
Status: DISCONTINUED | OUTPATIENT
Start: 2021-09-10 | End: 2021-09-10 | Stop reason: ALTCHOICE

## 2021-09-10 RX ORDER — TRIAMCINOLONE ACETONIDE 40 MG/ML
INJECTION, SUSPENSION INTRA-ARTICULAR; INTRAMUSCULAR PRN
Status: DISCONTINUED | OUTPATIENT
Start: 2021-09-10 | End: 2021-09-10 | Stop reason: ALTCHOICE

## 2021-09-10 NOTE — OP NOTE
Operative Note      Patient: Dorothy Gray  YOB: 1955  MRN: [id number]    Date of Procedure: 9/10/2021    Pre-Op Diagnosis: OSTEOARTHRITIS RIGHT HIP, RIGHT HIP PAIN    Post-Op Diagnosis: Same       Procedure(s):  RIGHT HIP JOINT INJECTION W/XRAY. CORTISONE INJECTION   LOCAL. 1:00PM    Surgeon(s):  Yosi Bryant MD    Assistant:   * No surgical staff found *    Anesthesia: Local    Estimated Blood Loss (mL): none    Complications: None    Specimens:   * No specimens in log *    Implants:  * No implants in log *      Drains: * No LDAs found *    Findings: see below      BRIEF HISTORY:   Patient is a 61 y.o. male with persistent right hip pain due. Patient elected to proceed with hip joint steroid injection was offered to for diagnostic and therapeutic purposes. The risks, benefits and alternatives of the procedure were discussed with the patient. The patient was given opportunity to ask questions regarding the procedure, its indications and the associated risks. The risk of the procedure discussed include infection, bleeding, allergic reaction, headache, nerve injuries, and cardiovascular and CNS side effects with possible of vascular entry of medications. I also informed the patient of potential side effects or reactions to the medications potentially used during the procedure including sedatives, narcotics, nonionic contrast agents, anesthetics, and corticosteroids. The patient was informed both verbally and in writing. The patient understood the informed consent and desired to have the procedure performed. PROCEDURE: Oxygen saturation and vital signs were monitored continuously throughout the procedure. The patient remained awake throughout the procedure in order to interact and give feedback. The X-ray technician was supervised and instructed to operate the fluoroscopy machine. The patient was placed in the supine position on the treatment table.  The skin over and surrounding the treatment area was cleaned widely with chloraprep. Fluoroscopy was used to identify the dario landmarks of the hip and the planned needle approach. The femoral artery was located by palpation of the pulse. The skin, subcutaneous tissue, and muscle within the planned needle approach were anesthetized with 1% Lidocaine 25 g 1.5 inch needle. All injected medications were preservative free. With fluoroscopy, a 22 gauge spinal needle was gently guided into the hip joint lateral to the femoral artery until tip abutted head-neck junction. Multiple fluoroscopic views were used to ensure proper needle placement. Stylet was removed and syringe containing the treatment solution, consisting of 60 mg kenalog and 6 mL of 0.25% marcaine was injected without resistance into the hip joint. Sterile technique was used throughout the procedure. Need was withdrawn and alcohol swab applied followed by band-aid. COMPLICATIONS: None. The patient tolerated the procedure well and was sent to the recovery room in good condition.     Electronically signed by Adilene Trevino MD on 9/10/2021 at 2:04 PM

## 2021-10-25 ENCOUNTER — HOSPITAL ENCOUNTER (OUTPATIENT)
Dept: PHYSICAL THERAPY | Age: 66
Setting detail: THERAPIES SERIES
Discharge: HOME | End: 2021-10-25
Payer: COMMERCIAL

## 2021-10-25 PROCEDURE — 97162 PT EVAL MOD COMPLEX 30 MIN: CPT

## 2021-10-25 PROCEDURE — 97110 THERAPEUTIC EXERCISES: CPT

## 2021-10-25 ASSESSMENT — PAIN DESCRIPTION - ORIENTATION: ORIENTATION: RIGHT

## 2021-10-25 ASSESSMENT — PAIN - FUNCTIONAL ASSESSMENT: PAIN_FUNCTIONAL_ASSESSMENT: PREVENTS OR INTERFERES WITH ALL ACTIVE AND SOME PASSIVE ACTIVITIES

## 2021-10-25 ASSESSMENT — PAIN DESCRIPTION - ONSET: ONSET: AWAKENED FROM SLEEP

## 2021-10-25 ASSESSMENT — PAIN DESCRIPTION - DIRECTION: RADIATING_TOWARDS: DENIES NT

## 2021-10-25 ASSESSMENT — PAIN DESCRIPTION - LOCATION: LOCATION: SHOULDER

## 2021-10-25 ASSESSMENT — PAIN DESCRIPTION - FREQUENCY: FREQUENCY: CONTINUOUS

## 2021-10-25 ASSESSMENT — PAIN DESCRIPTION - DESCRIPTORS: DESCRIPTORS: ACHING;BURNING;THROBBING

## 2021-10-25 NOTE — PROGRESS NOTES
[address] [facility] [address]  [address]  PVXDM:[phone]    [] Certification  [] Recertification [x]  Plan of Care  [] Progress Note [] Discharge      To:  Dr Michelle Shukla      From:  Millicent Reynolds, PT  Patient: Enoch Akers     : 1955  Diagnosis: Impingement syndrome R shoulder, OA, strain muscle, tendon long head bicep R s/p R RTC repair 10/4/21     Date: 10/25/2021  Treatment Diagnosis: R shoulder pain with strength/rom deficits s/p surgical repair     Progress Report Period from:  10/25/2021  to 10/25/2021    Total # of Visits to Date: 1   No Show: 0    Canceled Appointment: 0     OBJECTIVE:   Short Term Goals - Time Frame for Short term goals: 4 weeks    Goals Current/Discharge status  Met   Short term goal 1: The pt will demonstrate improved postural awareness requiring <25% VC's with exercises Written HEP initiated  for symptom management  Needs progression for comprehensive program development. [] yes  [x] no   Short term goal 2: Decrease R shoulder pain 50% to assist with improved functional gains. Pain Location: Shoulder         Pain Descriptors: Aching, Burning, Throbbing  worst pain 7-8/10 shoulder and partially down humerus and anteriorly. [] yes  [x] no   Short term goal 3: Pain-free R shoulder PROM to WNL allowing an increase in ADL tolerance  PROM RUE (degrees)  RUE General PROM: Shoulder flex 75, Abd 70, ER 25    [] yes  [x] no     Long Term Goals - Time Frame for Long term goals : 8-10 weeks  Goals Current/ Discharge status Met   Long term goal 1: Indep HEP for symptom management Needs progressive Written HEP provided  for symptom management   [] yes  [x] no   Long term goal 2: Pt demo improved overall function by reporting greater than 80% per functional survey score Exam: UEFI 8/80=10% functional   [] yes  [x] no   Long term goal 3: Pain-free R shoulder AROM to WNL allowing an increase in ADL tolerance. RUE General AROM: IR reach R iliac crest   [] yes  [x] no   Long term goal 4: Improve R shoulder strength 4/5 to 4+/5 to allow patient to reach overhead for daily activities return to work  Strength RUE  Comment: NT due to recent surgery    [] yes  [x] no       Body structures, Functions, Activity limitations: Decreased functional mobility , Decreased ADL status, Decreased ROM, Decreased strength, Increased pain, Decreased posture  Assessment: The pt's impairments currently limit functional abilities by 90% including his abilities to  reach and lift, perform recreational actvities, and perform household/work related duties without pain or limitations. Skilled PT required to address about deficits to improve over function and return to prior level of function. Prognosis: Good  Discharge Recommendations: Continue to assess pending progress    PT Education: Goals;PT Role;Plan of Care;Home Exercise Program    PLAN: [x] Evaluate and Treat  Frequency/Duration:  Plan  Times per week: 2  Plan weeks: 12  Current Treatment Recommendations: Strengthening, ROM, Neuromuscular Re-education, Home Exercise Program, Manual Therapy - Soft Tissue Mobilization, Modalities, Manual Therapy - Joint Manipulation     Precautions: follow RC protocol per Dr Adela Buerger office guidelines                          Patient Status:[x] Continue/ Initiate plan of Care    [] Discharge PT. Recommend pt continue with HEP. [] Additional visits requested, Please re-certify for additional visits:          Signature: Electronically signed by Sebas Le PT on 10/25/21 at 2:20 PM EDT      If you have any questions or concerns, please don't hesitate to call. Thank you for your referral.    I have reviewed this plan of care and certify a need for medically necessary rehabilitation services.     Physician Signature:__________________________________________________________  Date:  Please sign and return

## 2021-10-25 NOTE — PROGRESS NOTES
[facility]) Physical Therapy-  [place]  PHYSICAL THERAPY EVALUATION    Date: 10/25/2021  Patient Name: Ambrosio Johnson       MRN: [id number]   Account: [de-identified]   : 1955  (61 y.o.)   Gender: male   Referring Practitioner: Dr Bruce Weber                 Diagnosis: Impingement syndrome R shoulder, OA, strain muscle, tendon long head bicep R s/p R RTC repair 10/4/21  Treatment Diagnosis: R shoulder pain with strength/rom deficits s/p surgical repair  Additional Pertinent Hx: L TKR , R TKR , lumbar fusion , prostatectomy 2020, Arthritis, HTN, joint pain             Past Medical History:  has a past medical history of Anemia, CHF (congestive heart failure) ([address] Depression, Essential hypertension, Fatty liver, GERD (gastroesophageal reflux disease), Hepatitis C without hepatic coma, Hepatitis C without hepatic coma, Hyperlipidemia, Hypertension, Insomnia, Osteoarthritis, Osteoarthritis of spine with radiculopathy, lumbar region, Prostate cancer ([address] and Prostate cancer ([address] Past Surgical History:   has a past surgical history that includes Knee arthroscopy (Bilateral, ); Rotator cuff repair (Right, ); Carpal tunnel release (Bilateral, ); Total knee arthroplasty (Right, 11-3-14); Colonoscopy (3-2014-PROBABLY  not14); lumbar laminectomy (16); pr total knee arthroplasty (Left, 2018); Endoscopy, colon, diagnostic; Ultrasound Prostate/Transrectal (N/A, 3/20/2019); Colonoscopy (N/A, 2019); Spine surgery; Carpal tunnel release; knee surgery (Left); shoulder surgery; Prostatectomy (2020); joint replacement (); joint replacement (); joint replacement; Tonsillectomy (); back surgery (); back surgery (); lumbar fusion (N/A, 2020); and hip surgery (Right, 9/10/2021).     Vital Signs  Patient Currently in Pain: Denies (worst pain 7-8/10 shoulder and partially down humerus and anteriorly.)   Pain Screening  Patient Currently in Pain: Denies (worst pain 7-8/10 shoulder and partially down humerus and anteriorly.)  Pain Assessment  Pain Assessment: 0-10  Pain Location: Shoulder  Pain Orientation: Right  Pain Radiating Towards: denies NT  Pain Descriptors: Aching;Burning; Throbbing  Pain Frequency: Continuous  Pain Onset: Awakened from sleep  Functional Pain Assessment: Prevents or interferes with all active and some passive activities (Pt reports currently less than 20% function, 75% before surgery)      Lives With: Spouse  Bathroom Shower/Tub: Tub/Shower unit (with help from wife)  ADL Assistance: Needs assistance  Homemaking Responsibilities: Yes  Ambulation Assistance: Independent  Transfer Assistance: Independent  Active : Yes  Mode of Transportation: Car  Type of occupation:  [employer] last week of November [address]: [employer]        Subjective:  Subjective: Pt reports working up until the friday before sugery with much diffiulty. Surgery to R shoulder to repair RC. Pt wearing sling with abductor pillow. Comments: RTD 11/17? Objective:         Strength RUE  Comment: NT due to recent surgery  Strength LUE  Comment: Shoulder 3+/5, elbow 4-/5, wrist 4/5   PROM: Shoulder flex 75, Abd 70, ER 25   AROM RUE (degrees)  RUE General AROM: IR reach R iliac crest  AROM LUE (degrees)  LUE AROM : WNL  LUE General AROM: Increased effort to reach IR T9    Spine  Cervical: FLex, ext WNL, SB 50% B    Observation/Palpation  Posture: Fair  Palpation: Min tenderness R UT and anterior deltoid  Observation: sling present and forward head roudned shoulder with abduction.         Exercises:   Exercises  Exercise 1: Full PROM graduallly by 11/29 desired  Exercise 2: pulleys*  Exercise 3: scap retraction*  Exercise 4: pendulums discussed and self demo has written HEP  Exercise 5: Elbow Arom educated seated in chair*  Exercise 6:  squeeze self demo and has written HEP,  wrist resistance*  Exercise 7: wand flex/ER*  Exercise 8: Phase III begin strengthening to start 11/1/21 isometrics, theraband, RC dumbell, flex, abduction, SL ER , prone ext/horizontal abd, ball stab on table  Exercise 20: HEP: pendulums, scap retraction, elbow flex/ext,  squeeze (has pictures)  Modalities:  Modalities  Cryotherapy (Minutes\Location): *R shoulder  E-stim (parameters): *  Manual:  Manual therapy  PROM: R shoulder*  Soft Tissue Mobalization: periscapular*  *Indicates exercise,modality, or manual techniques to be initiated when appropriate  Assessment: Body structures, Functions, Activity limitations: Decreased functional mobility , Decreased ADL status, Decreased ROM, Decreased strength, Increased pain, Decreased posture  Assessment: The pt's impairments currently limit functional abilities by 90% including his abilities to  reach and lift, perform recreational actvities, and perform household/work related duties without pain or limitations. Skilled PT required to address about deficits to improve over function and return to prior level of function. Prognosis: Good  Discharge Recommendations: Continue to assess pending progress        Decision Making: Medium Complexity  History: L TKR 2018, R TKR 2015, lumbar fusion 2020, prostatectomy 4/2020, Arthritis, HTN, joint pain  Exam: UEFI 8/80=10% functional  Clinical Presentation: evolving        Plan  Frequency/Duration:  Plan  Times per week: 2  Plan weeks: 12  Current Treatment Recommendations: Strengthening, ROM, Neuromuscular Re-education, Home Exercise Program, Manual Therapy - Soft Tissue Mobilization, Modalities, Manual Therapy - Joint Manipulation     Patient Education  New Education Provided: PT Education: Goals;PT Role;Plan of Care;Home Exercise Program    POST-PAIN     Pain Rating (0-10 pain scale): No change/10  Location and pain description same as pre-treatment unless indicated.    Action: [x] NA  [] Call Physician  [] Perform HEP  [] Meds as prescribed    Evaluation and patient rights have been reviewed and patient agrees with plan of care. Yes  [x]  No  []   Explain:       Miriam Fall Risk Assessment  Risk Factor Scale  Score   History of Falls [] Yes  [x] No 25  0 0   Secondary Diagnosis [] Yes  [x] No 15  0 0   Ambulatory Aid [] Furniture  [] Crutches/cane/walker  [x] None/bedrest/wheelchair/nurse 30  15  0 0   IV/Heparin Lock [] Yes  [x] No 20  0 0   Gait/Transferring [] Impaired  [] Weak  [x] Normal/bedrest/immobile 20  10  0 0   Mental Status [] Forgets limitations  [x] Oriented to own ability 15  0 0      Total:0     Based on the Assessment score: check the appropriate box. [x]  No intervention needed   Low =   Score of 0-24  []  Use standard prevention interventions Moderate =  Score of 24-44   [] Discuss fall prevention strategies   [] Indicate moderate falls risk on eval  []  Use high risk prevention interventions High = Score of 45 and higher   [] Discuss fall prevention strategies   [] Provide supervision during treatment time    Goals  Short term goals  Time Frame for Short term goals: 4 weeks  Short term goal 1: The pt will demonstrate improved postural awareness requiring <25% VC's with exercises  Short term goal 2: Decrease R shoulder pain 50% to assist with improved functional gains. Short term goal 3: Pain-free R shoulder PROM to WNL allowing an increase in ADL tolerance  Long term goals  Time Frame for Long term goals : 8-10 weeks  Long term goal 1: Indep HEP for symptom management  Long term goal 2: Pt demo improved overall function by reporting greater than 80% per functional survey score  Long term goal 3: Pain-free R shoulder AROM to WNL allowing an increase in ADL tolerance.   Long term goal 4: Improve R shoulder strength 4/5 to 4+/5 to allow patient to reach overhead for daily activities return to work    PT Individual Minutes  Time In: 1100  Time Out: 1145  Minutes: 45    Timed Activity Minutes Units   Ther Ex 8 1     Electronically signed by Sheree Santos PT on 10/25/21 at 2:09

## 2021-10-27 ENCOUNTER — HOSPITAL ENCOUNTER (OUTPATIENT)
Dept: PHYSICAL THERAPY | Age: 66
Setting detail: THERAPIES SERIES
Discharge: HOME | End: 2021-10-27
Payer: COMMERCIAL

## 2021-10-27 PROCEDURE — 97110 THERAPEUTIC EXERCISES: CPT

## 2021-10-27 PROCEDURE — 97140 MANUAL THERAPY 1/> REGIONS: CPT

## 2021-10-27 PROCEDURE — G0283 ELEC STIM OTHER THAN WOUND: HCPCS

## 2021-10-27 ASSESSMENT — PAIN - FUNCTIONAL ASSESSMENT: PAIN_FUNCTIONAL_ASSESSMENT: PREVENTS OR INTERFERES WITH ALL ACTIVE AND SOME PASSIVE ACTIVITIES

## 2021-10-27 ASSESSMENT — PAIN DESCRIPTION - ONSET: ONSET: AWAKENED FROM SLEEP

## 2021-10-27 ASSESSMENT — PAIN DESCRIPTION - LOCATION: LOCATION: SHOULDER

## 2021-10-27 ASSESSMENT — PAIN SCALES - GENERAL: PAINLEVEL_OUTOF10: 0

## 2021-10-27 ASSESSMENT — PAIN DESCRIPTION - ORIENTATION: ORIENTATION: RIGHT

## 2021-10-27 NOTE — PROGRESS NOTES
[address] [address],[address]  [facility]  QVTWH:[phone]        Date: 10/27/2021  Patient: Charmaine Dangelo  : 1955   ACCT #: [de-identified]  Referring Practitioner: Dr Kamilah Martinez  Diagnosis: Impingement syndrome R shoulder, OA, strain muscle, tendon long head bicep R s/p R RTC repair 10/4/21  Treatment Diagnosis: R shoulder pain with strength/rom deficits s/p surgical repair    Visit Information:  PT Visit Information  Onset Date: 10/04/21  PT Insurance Information: BCBS  Total # of Visits Approved: 150 ([facility] [place])  Total # of Visits to Date: 2  No Show: 0  Canceled Appointment: 0  Progress Note Counter:     Subjective: Pt states he is doing his pendulum, , and elbow exercises at home. No pain coming into PT this morning. Comments: RTD ?   HEP Compliance:  [x] Good [] Fair [] Poor [] Reports not doing due to:    Vital Signs  Patient Currently in Pain: Denies   Pain Screening  Patient Currently in Pain: Denies  Pain Assessment  Pain Assessment: 0-10  Pain Level: 0  Pain Location: Shoulder  Pain Orientation: Right  Pain Onset: Awakened from sleep  Functional Pain Assessment: Prevents or interferes with all active and some passive activities    OBJECTIVE:   Exercises  Exercise 1: Full PROM graduallly by  desired  Exercise 2: pulleys x 10, 5 secs  Exercise 3: scap retraction/shrugs x 10, 5 secs  Exercise 4: pendulums discussed and self demo has written HEP(reviewed this date)  Exercise 5: Elbow Arom educated seated in chair*  Exercise 6:  squeeze self demo and has written HEP,  wrist resistance*  Exercise 7: wand flex/ER*  Exercise 8: Phase III begin strengthening to start 21 isometrics, theraband, RC dumbell, flex, abduction, SL ER , prone ext/horizontal abd, ball stab on table    Strength: [x] NT  [] MMT completed:     ROM: [] NT  [x] ROM measurements:   PROM RUE (degrees)  RUE General PROM: shoulder flex 125, Abd 84, ER 15 Manual:   Manual therapy  PROM: R shoulder PROM performed in flexion,abd,ER x 25 mins    Modalities:  Modalities  Cryotherapy (Minutes\Location): Ice pack applied during E stim tx x 10 mins  E-stim (parameters): Pre mod e stim provided to R shoulder x 10 mins to manage pain. *Indicates exercise, modality, or manual techniques to be initiated when appropriate    Assessment: Body structures, Functions, Activity limitations: Decreased functional mobility , Decreased ADL status, Decreased ROM, Decreased strength, Increased pain, Decreased posture  Assessment: Pt began R shoulder post op RTC protocol this date. Provided PROM to R shoulder in flexion, abduction, and ER for improving joint mobility. He responded with an increase in flexion and abduction ROM when compared to initial evaluation. Initiated shoulder pulleys, scap squeezes for scapular involvement. He is compliant with HEP and will continue issued exercises. Treatment Diagnosis: R shoulder pain with strength/rom deficits s/p surgical repair  Prognosis: Good       Goals:  Short term goals  Time Frame for Short term goals: 4 weeks  Short term goal 1: The pt will demonstrate improved postural awareness requiring <25% VC's with exercises  Short term goal 2: Decrease R shoulder pain 50% to assist with improved functional gains. Short term goal 3: Pain-free R shoulder PROM to WNL allowing an increase in ADL tolerance    Long term goals  Time Frame for Long term goals : 8-10 weeks  Long term goal 1: Indep HEP for symptom management  Long term goal 2: Pt demo improved overall function by reporting greater than 80% per functional survey score  Long term goal 3: Pain-free R shoulder AROM to WNL allowing an increase in ADL tolerance.   Long term goal 4: Improve R shoulder strength 4/5 to 4+/5 to allow patient to reach overhead for daily activities return to work  Progress toward goals: Initiated PT POC this visit    POST-PAIN       Pain Rating (0-10 pain scale):   0/10   Location and pain description same as pre-treatment unless indicated. Action: [x] NA   [] Perform HEP  [] Meds as prescribed  [] Modalities as prescribed   [] Call Physician     Frequency/Duration:  Plan  Times per week: 2  Plan weeks: 12  Current Treatment Recommendations: Strengthening, ROM, Neuromuscular Re-education, Home Exercise Program, Manual Therapy - Soft Tissue Mobilization, Modalities, Manual Therapy - Joint Manipulation     Pt to continue current HEP. See objective section for any therapeutic exercise changes, additions or modifications this date.     PT Individual Minutes  Time In: 0932  Time Out: 1022  Minutes: 50  Timed Code Treatment Minutes: 38 Minutes  Procedure Minutes: x 10 mins pre mod e stim/ice pack to R shoulder     Timed Activity Minutes Units   Ther Ex 12 1   Manual  26 2       Signature:  Electronically signed by Abiola Sloan PTA on 10/27/21 at 10:18 AM EDT

## 2021-11-01 ENCOUNTER — APPOINTMENT (OUTPATIENT)
Dept: PHYSICAL THERAPY | Age: 66
End: 2021-11-01
Payer: COMMERCIAL

## 2021-11-01 ENCOUNTER — HOSPITAL ENCOUNTER (OUTPATIENT)
Dept: PHYSICAL THERAPY | Age: 66
Setting detail: THERAPIES SERIES
Discharge: HOME | End: 2021-11-01
Payer: COMMERCIAL

## 2021-11-01 PROCEDURE — 97110 THERAPEUTIC EXERCISES: CPT

## 2021-11-01 PROCEDURE — 97140 MANUAL THERAPY 1/> REGIONS: CPT

## 2021-11-01 ASSESSMENT — PAIN SCALES - GENERAL: PAINLEVEL_OUTOF10: 0

## 2021-11-01 ASSESSMENT — PAIN DESCRIPTION - LOCATION: LOCATION: SHOULDER

## 2021-11-01 ASSESSMENT — PAIN - FUNCTIONAL ASSESSMENT: PAIN_FUNCTIONAL_ASSESSMENT: PREVENTS OR INTERFERES WITH ALL ACTIVE AND SOME PASSIVE ACTIVITIES

## 2021-11-01 ASSESSMENT — PAIN DESCRIPTION - ORIENTATION: ORIENTATION: RIGHT

## 2021-11-01 NOTE — PROGRESS NOTES
[address] [address]  [address]  OODVS:[phone]        Date: 2021  Patient: Rodney Grider  : 1955  ACCT #: [de-identified]  Referring Practitioner: Dr Twan Weinberg  Diagnosis: Impingement syndrome R shoulder, OA, strain muscle, tendon long head bicep R s/p R RTC repair 10/4/21  Treatment Diagnosis: R shoulder pain with strength/rom deficits s/p surgical repair    Visit Information:  PT Visit Information  Onset Date: 10/04/21  PT Insurance Information: BCBS  Total # of Visits Approved: 150 ([facility] [place])  Total # of Visits to Date: 3  No Show: 0  Canceled Appointment: 0  Progress Note Counter: 3/18-24    Subjective: Pt states he is wearing his sling throughout the day. No c/o pain coming into PT. Comments: RTD ?   HEP Compliance:  [x] Good [] Fair [] Poor [] Reports not doing due to:    Vital Signs  Patient Currently in Pain: Denies   Pain Screening  Patient Currently in Pain: Denies  Pain Assessment  Pain Assessment: 0-10  Pain Level: 0  Pain Location: Shoulder  Pain Orientation: Right  Functional Pain Assessment: Prevents or interferes with all active and some passive activities     OBJECTIVE:   Exercises  Exercise 1: Full PROM graduallly by  desired  Exercise 2: pulleys x 10, 5 secs  Exercise 5: Elbow Arom educated seated in chair*  Exercise 6:  squeeze self demo and has written HEP,  wrist resistance*  Exercise 7: wand flex/ER x 10, 5 secs each  Exercise 8: Phase III begin strengthening to start 21 isometrics, theraband, RC dumbell, flex, abduction, SL ER , prone ext/horizontal abd, ball stab on table  Exercise 9: Shoulder ER, IR, flx, ext iso's(blue ball) x 10, 5 secs each    Strength: [x] NT  [] MMT completed:      ROM: [] NT  [x] ROM measurements:   PROM RUE (degrees)  RUE General PROM: shoulder flex 128, Abd 92, ER 38      Manual:   Manual therapy  PROM: R shoulder PROM performed in flexion,abd,ER x 26 mins    *Indicates exercise, modality, or manual techniques to be initiated when appropriate    Assessment: Body structures, Functions, Activity limitations: Decreased functional mobility , Decreased ADL status, Decreased ROM, Decreased strength, Increased pain, Decreased posture  Assessment: Reviewed previous R shoulder ROM exercises. Demonstrates good compliance with intial HEP. He was able to progress with post op protocol this session. He began wand flexion,ER exercise to improve joint mobility. He also began phase 3, performing shoulder isometrics such as ER, IR, ext, and flexion. He is responding well to exercise program without any complaints. Treatment Diagnosis: R shoulder pain with strength/rom deficits s/p surgical repair  Prognosis: Good     Goals:  Short term goals  Time Frame for Short term goals: 4 weeks  Short term goal 1: The pt will demonstrate improved postural awareness requiring <25% VC's with exercises  Short term goal 2: Decrease R shoulder pain 50% to assist with improved functional gains. Short term goal 3: Pain-free R shoulder PROM to WNL allowing an increase in ADL tolerance    Long term goals  Time Frame for Long term goals : 8-10 weeks  Long term goal 1: Indep HEP for symptom management  Long term goal 2: Pt demo improved overall function by reporting greater than 80% per functional survey score  Long term goal 3: Pain-free R shoulder AROM to WNL allowing an increase in ADL tolerance. Long term goal 4: Improve R shoulder strength 4/5 to 4+/5 to allow patient to reach overhead for daily activities return to work  Progress toward goals: Pt continued progression through post op protocol beginning shoulder isometrics. POST-PAIN       Pain Rating (0-10 pain scale):   0/10   Location and pain description same as pre-treatment unless indicated.    Action: [x] NA   [] Perform HEP  [] Meds as prescribed  [] Modalities as prescribed   [] Call Physician     Frequency/Duration:  Plan  Times per week: 2  Plan weeks: 12  Current Treatment Recommendations: Strengthening, ROM, Neuromuscular Re-education, Home Exercise Program, Manual Therapy - Soft Tissue Mobilization, Modalities, Manual Therapy - Joint Manipulation     Pt to continue current HEP. See objective section for any therapeutic exercise changes, additions or modifications this date.     PT Individual Minutes  Time In: 1520  Time Out: 0943  Minutes: 42  Timed Code Treatment Minutes: 41 Minutes     Timed Activity Minutes Units   Ther Ex 15 1   Manual  26 2       Signature:  Electronically signed by Baron Dong PTA on 11/1/21 at 3:09 PM EDT

## 2021-11-03 ENCOUNTER — HOSPITAL ENCOUNTER (OUTPATIENT)
Dept: PHYSICAL THERAPY | Age: 66
Setting detail: THERAPIES SERIES
Discharge: HOME | End: 2021-11-03
Payer: COMMERCIAL

## 2021-11-03 PROCEDURE — G0283 ELEC STIM OTHER THAN WOUND: HCPCS

## 2021-11-03 PROCEDURE — 97110 THERAPEUTIC EXERCISES: CPT

## 2021-11-03 PROCEDURE — 97140 MANUAL THERAPY 1/> REGIONS: CPT

## 2021-11-03 ASSESSMENT — PAIN - FUNCTIONAL ASSESSMENT: PAIN_FUNCTIONAL_ASSESSMENT: PREVENTS OR INTERFERES WITH ALL ACTIVE AND SOME PASSIVE ACTIVITIES

## 2021-11-03 ASSESSMENT — PAIN DESCRIPTION - ORIENTATION: ORIENTATION: RIGHT

## 2021-11-03 ASSESSMENT — PAIN DESCRIPTION - LOCATION: LOCATION: SHOULDER

## 2021-11-03 ASSESSMENT — PAIN DESCRIPTION - DESCRIPTORS: DESCRIPTORS: SORE

## 2021-11-03 ASSESSMENT — PAIN SCALES - GENERAL: PAINLEVEL_OUTOF10: 0

## 2021-11-03 NOTE — PROGRESS NOTES
[address] [address]  [address]  AXRYR:[phone]        Date: 11/3/2021  Patient: Ros Gerardo  : 1955  ACCT #: [de-identified]  Referring Practitioner: Dr Mindy Castano  Diagnosis: Impingement syndrome R shoulder, OA, strain muscle, tendon long head bicep R s/p R RTC repair 10/4/21  Treatment Diagnosis: R shoulder pain with strength/rom deficits s/p surgical repair    Visit Information:  PT Visit Information  Onset Date: 10/04/21  PT Insurance Information: BCBS  Total # of Visits Approved: 150 ([facility])  Total # of Visits to Date: 4  No Show: 0  Canceled Appointment: 0  Progress Note Counter:     Subjective: Pt states he is doing fine coming into PT. He was a little sore after doing new exercises last visit. He is doing the exercises at home as well. Comments: RTD ?   HEP Compliance:  [x] Good [] Fair [] Poor [] Reports not doing due to:    Vital Signs  Patient Currently in Pain: Denies   Pain Screening  Patient Currently in Pain: Denies  Pain Assessment  Pain Assessment: 0-10  Pain Level: 0  Pain Location: Shoulder  Pain Orientation: Right  Pain Descriptors: Sore  Functional Pain Assessment: Prevents or interferes with all active and some passive activities    OBJECTIVE:   Exercises  Exercise 1: Full PROM graduallly by  desired  Exercise 2: pulleys x 10, 5 secs  Exercise 5: Elbow Arom educated seated in chair*  Exercise 6:  squeeze self demo and has written HEP,  wrist resistance*  Exercise 7: wand flex/ER x 10, 5 secs each  Exercise 8: Phase III begin strengthening to start 21 isometrics, theraband, RC dumbell, flex, abduction, SL ER , prone ext/horizontal abd, ball stab on table  Exercise 9: Shoulder ER, IR, flx, ext iso's(blue ball) x 10, 5 secs each  Exercise 10: seated shoulder flexion, abduction x 10, 5 secs  Exercise 20: HEP: issued shoulder isometrics, cane exercises, seated shoulder ROM(2021)    Strength: [x] NT  [] MMT completed:     ROM: [] NT  [x] ROM measurements:   PROM RUE (degrees)  RUE General PROM: shoulder flex 125, Abd 100, ER 35      Manual:   Manual therapy  PROM: R shoulder PROM performed in flexion,abd,ER x 29 mins    Modalities:  Modalities  Cryotherapy (Minutes\Location): Ice pack applied during E stim tx x 10 mins  E-stim (parameters): Pre mod e stim provided to R shoulder x 10 mins to manage pain. *Indicates exercise, modality, or manual techniques to be initiated when appropriate    Assessment: Body structures, Functions, Activity limitations: Decreased functional mobility , Decreased ADL status, Decreased ROM, Decreased strength, Increased pain, Decreased posture  Assessment: Pt with good progression through post op RTC exercises. He is tolerating shoulder pulleys, wand exercises, and seated shoulder flexion for improving AROM. Incorporated shoulder isometrics for increasing strength. Updated HEP with good understanding. Treatment Diagnosis: R shoulder pain with strength/rom deficits s/p surgical repair  Prognosis: Good  PT Education: Home Exercise Program  Patient Education: Updated HEP this visit    Goals:  Short term goals  Time Frame for Short term goals: 4 weeks  Short term goal 1: The pt will demonstrate improved postural awareness requiring <25% VC's with exercises  Short term goal 2: Decrease R shoulder pain 50% to assist with improved functional gains. Short term goal 3: Pain-free R shoulder PROM to WNL allowing an increase in ADL tolerance    Long term goals  Time Frame for Long term goals : 8-10 weeks  Long term goal 1: Indep HEP for symptom management  Long term goal 2: Pt demo improved overall function by reporting greater than 80% per functional survey score  Long term goal 3: Pain-free R shoulder AROM to WNL allowing an increase in ADL tolerance.   Long term goal 4: Improve R shoulder strength 4/5 to 4+/5 to allow patient to reach overhead for daily activities return to work  Progress toward goals: Continued progression through post op RTC protocol addressing long term goal 3. POST-PAIN       Pain Rating (0-10 pain scale):   0/10   Location and pain description same as pre-treatment unless indicated. Action: [x] NA   [] Perform HEP  [] Meds as prescribed  [] Modalities as prescribed   [] Call Physician     Frequency/Duration:  Plan  Times per week: 2  Plan weeks: 12  Current Treatment Recommendations: Strengthening, ROM, Neuromuscular Re-education, Home Exercise Program, Manual Therapy - Soft Tissue Mobilization, Modalities, Manual Therapy - Joint Manipulation     Pt to continue current HEP. See objective section for any therapeutic exercise changes, additions or modifications this date.     PT Individual Minutes  Time In: 0930  Time Out: 1032  Minutes: 62  Timed Code Treatment Minutes: 49 Minutes  Procedure Minutes: x 10 mins pre mod to R shoulder     Timed Activity Minutes Units   Ther Ex 20 1   Manual  29 2       Signature:  Electronically signed by Moses Turner PTA on 11/3/21 at 10:30 AM EDT

## 2021-11-08 ENCOUNTER — HOSPITAL ENCOUNTER (OUTPATIENT)
Dept: PHYSICAL THERAPY | Age: 66
Setting detail: THERAPIES SERIES
Discharge: HOME | End: 2021-11-08
Payer: COMMERCIAL

## 2021-11-08 PROCEDURE — G0283 ELEC STIM OTHER THAN WOUND: HCPCS

## 2021-11-08 PROCEDURE — 97140 MANUAL THERAPY 1/> REGIONS: CPT

## 2021-11-08 PROCEDURE — 97110 THERAPEUTIC EXERCISES: CPT

## 2021-11-08 NOTE — PROGRESS NOTES
[address] [address]  [address]  BLDTX:[phone]        Date: 2021  Patient: Enmanuel Montoya  : 1955  ACCT #: [de-identified]  Referring Practitioner: Dr Yeboah  Diagnosis: Impingement syndrome R shoulder, OA, strain muscle, tendon long head bicep R s/p R RTC repair 10/4/21  Treatment Diagnosis: R shoulder pain with strength/rom deficits s/p surgical repair    Visit Information:  PT Visit Information  Onset Date: 10/04/21  PT Insurance Information: BCBS  Total # of Visits Approved: 150 ([facility])  Total # of Visits to Date: 5  No Show: 0  Canceled Appointment: 0  Progress Note Counter:     Subjective: No new complaints. Compliant with home exericses  Comments: RTD ? HEP Compliance:  [x] Good  [] Fair  [] Poor [] Reports not doing due to:    Vital Signs  Patient Currently in Pain: Denies   Pain Screening  Patient Currently in Pain: Denies    OBJECTIVE:   Exercises  Exercise 1: Full PROM graduallly by  desired  Exercise 2: Phase III begin strengthening to start 21 isometrics, theraband, RC dumbell, flex, abduction, SL ER , prone ext/horizontal abd, ball stab on table  Exercise 3: pulleys x 10, 5 secs  Exercise 4: wand flex/ER x 10, 5\" ea  Exercise 5: finger ladder flex/abd x 5, 5\" ea  Exercise 6: stand shl flex with 1# wt 10 x 5\"  Exercise 7: stand shl abd - held today  Exercise 8: tband mid row/ext with GTG x 10, 5\" ea  Exercise 9: tband IR (1 GTB) x 10, 5\"  Exercise 10: shoulder ER (in sidelying or YTB next visit)  Exercise 20: HEP: issued shoulder isometrics, cane exercises, seated shoulder ROM(2021)    Strength: [x] NT  [] MMT completed:    ROM: [] NT  [x] ROM measurements:  PROM RUE (degrees)  RUE General PROM: shoulder flex 140, Abd 170, ER 40    Manual:   Manual therapy  PROM: R shoulder PROM performed in flexion,abd,ER x 18 min    Modalities:  Modalities  Cryotherapy (Minutes\Location):  Ice pack applied during E stim tx x 10 mins  E-stim (parameters): Pre mod e stim provided to R shoulder x 10 mins to manage pain. *Indicates exercise, modality, or manual techniques to be initiated when appropriate    Assessment: Body structures, Functions, Activity limitations: Decreased functional mobility , Decreased ADL status, Decreased ROM, Decreased strength, Increased pain, Decreased posture  Assessment: Pt with good progression through post op RTC exercises. He is tolerating shoulder pulleys, wand exercises, and seated shoulder flexion for improving AROM. Incorporated shoulder isometrics for increasing strength. Updated HEP with good understanding. Treatment Diagnosis: R shoulder pain with strength/rom deficits s/p surgical repair  Prognosis: Good    Goals:  Short term goals  Time Frame for Short term goals: 4 weeks  Short term goal 1: The pt will demonstrate improved postural awareness requiring <25% VC's with exercises  Short term goal 2: Decrease R shoulder pain 50% to assist with improved functional gains. Short term goal 3: Pain-free R shoulder PROM to WNL allowing an increase in ADL tolerance    Long term goals  Time Frame for Long term goals : 8-10 weeks  Long term goal 1: Indep HEP for symptom management  Long term goal 2: Pt demo improved overall function by reporting greater than 80% per functional survey score  Long term goal 3: Pain-free R shoulder AROM to WNL allowing an increase in ADL tolerance. Long term goal 4: Improve R shoulder strength 4/5 to 4+/5 to allow patient to reach overhead for daily activities return to work  Progress toward goals: ongoing, showing great improvement in SHL ROM (see above)    POST-PAIN       Pain Rating (0-10 pain scale):   0/10   Location and pain description same as pre-treatment unless indicated.    Action: [x] NA   [] Perform HEP  [] Meds as prescribed  [] Modalities as prescribed   [] Call Physician     Frequency/Duration:  Plan  Times per week: 2  Plan weeks: 12  Current Treatment Recommendations: Strengthening, ROM, Neuromuscular Re-education, Home Exercise Program, Manual Therapy - Soft Tissue Mobilization, Modalities, Manual Therapy - Joint Manipulation    Pt to continue current HEP. See objective section for any therapeutic exercise changes, additions or modifications this date.     PT Individual Minutes  Time In: 0940  Time Out: 2397  Minutes: 60   Timed Code Treatment Minutes: 48 Minutes  Procedure Minutes: E-stim with CP x 10'      Timed Activity Minutes Units   Ther Ex 30 2   Manual (PROM)  18 1       Signature:  Electronically signed by Amadou Jay PTA on 11/8/21 at 11:38 AM EST

## 2021-11-10 ENCOUNTER — HOSPITAL ENCOUNTER (OUTPATIENT)
Dept: PHYSICAL THERAPY | Age: 66
Setting detail: THERAPIES SERIES
Discharge: HOME | End: 2021-11-10
Payer: COMMERCIAL

## 2021-11-10 PROCEDURE — G0283 ELEC STIM OTHER THAN WOUND: HCPCS

## 2021-11-10 PROCEDURE — 97110 THERAPEUTIC EXERCISES: CPT

## 2021-11-10 PROCEDURE — 97140 MANUAL THERAPY 1/> REGIONS: CPT

## 2021-11-10 NOTE — PROGRESS NOTES
[address] [address]  [address]  [phone]        Date: 11/10/2021  Patient: Tim Aguila  : 1955  ACCT #: [de-identified]  Referring Practitioner: Dr Kenneth Mcmahan  Diagnosis: Impingement syndrome R shoulder, OA, strain muscle, tendon long head bicep R s/p R RTC repair 10/4/21  Treatment Diagnosis: R shoulder pain with strength/rom deficits s/p surgical repair    Visit Information:  PT Visit Information  Onset Date: 10/04/21  PT Insurance Information: BCBS  Total # of Visits Approved: 150 ([facility] [place])  Total # of Visits to Date: 6  No Show: 0  Canceled Appointment: 0  Progress Note Counter:     Subjective: Pt denies R shoulder pain. Continues to work on exericses at home twice a day. Comments: RTD ?   HEP Compliance:  [x] Good [] Fair [] Poor [] Reports not doing due to:    Vital Signs  Patient Currently in Pain: Denies   Pain Screening  Patient Currently in Pain: Denies    OBJECTIVE:   Exercises  Exercise 1: Full PROM graduallly by  desired  Exercise 2: Phase III begin strengthening to start 21 isometrics, theraband, RC dumbell, flex, abduction, SL ER , prone ext/horizontal abd, ball stab on table  Exercise 3: pulleys x 10, 5 secs  Exercise 4: wand flex/ER x 10, 5\" ea  Exercise 5: finger ladder flex/abd x 5, 5\" ea  Exercise 6: stand shl flex with 1# wt 5 x 3\", 0# 5 x 3\"  Exercise 8: tband mid row/ext with GTG x 10, 5\" ea  Exercise 9: tband IR (1 GTB) x 10, 5\"  Exercise 10: SL shl abd x 10, 5\"  Exercise 11: SL ER 1# x 10, 5\"  Exercise 20: HEP: issued shoulder isometrics, cane exercises, seated shoulder ROM(2021)      Strength: [] NT  [x] MMT completed:  R UE: Shoulder flex/abd 3/5, IR/ER 3+/5, elbow 4/5  L UE: Shoulder 4/5, elbow 4+/5    ROM: [] NT  [x] ROM measurements:  PROM RUE (degrees)  RUE General PROM: Shoulder Flex 155, Abd 175, ER 55    Manual:   Manual therapy  PROM: R shoulder PROM performed in flexion,abd,ER x 15 min    Modalities:  Modalities  Cryotherapy (Minutes\Location): Ice pack applied during E stim tx x 10 mins  E-stim (parameters): Pre mod e stim provided to R shoulder x 10 mins to manage pain. *Indicates exercise, modality, or manual techniques to be initiated when appropriate    Assessment: Body structures, Functions, Activity limitations: Decreased functional mobility , Decreased ADL status, Decreased ROM, Decreased strength, Increased pain, Decreased posture  Assessment: Pt with good progression through post op RTC exercises. He is tolerating shoulder pulleys, wand exercises, and seated shoulder flexion for improving AROM. Incorporated shoulder isometrics for increasing strength. Updated HEP with good understanding. Treatment Diagnosis: R shoulder pain with strength/rom deficits s/p surgical repair  Prognosis: Good    Goals:  Short term goals  Time Frame for Short term goals: 4 weeks  Short term goal 1: The pt will demonstrate improved postural awareness requiring <25% VC's with exercises  Short term goal 2: Decrease R shoulder pain 50% to assist with improved functional gains. Short term goal 3: Pain-free R shoulder PROM to WNL allowing an increase in ADL tolerance    Long term goals  Time Frame for Long term goals : 8-10 weeks  Long term goal 1: Indep HEP for symptom management  Long term goal 2: Pt demo improved overall function by reporting greater than 80% per functional survey score  Long term goal 3: Pain-free R shoulder AROM to WNL allowing an increase in ADL tolerance. Long term goal 4: Improve R shoulder strength 4/5 to 4+/5 to allow patient to reach overhead for daily activities return to work  Progress toward goals: Showing gains in R shoulder ROM & began testing R UE strength today for baseline measures     POST-PAIN       Pain Rating (0-10 pain scale):   0/10   Location and pain description same as pre-treatment unless indicated.    Action: [] NA   [x] Perform HEP  [] Meds as prescribed  [] Modalities as prescribed   [] Call Physician     Frequency/Duration:  Plan  Times per week: 2  Plan weeks: 12  Current Treatment Recommendations: Strengthening, ROM, Neuromuscular Re-education, Home Exercise Program, Manual Therapy - Soft Tissue Mobilization, Modalities, Manual Therapy - Joint Manipulation    Pt to continue current HEP. See objective section for any therapeutic exercise changes, additions or modifications this date.     PT Individual Minutes  Time In: 0555  Time Out: [place]  Minutes: 72  Timed Code Treatment Minutes: 55 Minutes  Procedure Minutes: E-stim with CP x 10'      Timed Activity Minutes Units   Ther Ex 40 3   Manual (PROM) 15 1       Signature:  Electronically signed by Cecelia Robb PTA on 11/10/21 at 9:23 AM EST Route: IM

## 2021-11-15 ENCOUNTER — HOSPITAL ENCOUNTER (OUTPATIENT)
Dept: PHYSICAL THERAPY | Age: 66
Setting detail: THERAPIES SERIES
Discharge: HOME | End: 2021-11-15
Payer: COMMERCIAL

## 2021-11-15 PROCEDURE — 97110 THERAPEUTIC EXERCISES: CPT

## 2021-11-15 PROCEDURE — 97140 MANUAL THERAPY 1/> REGIONS: CPT

## 2021-11-15 PROCEDURE — G0283 ELEC STIM OTHER THAN WOUND: HCPCS

## 2021-11-15 NOTE — PROGRESS NOTES
as prescribed   [] Call Physician     Frequency/Duration:  Plan  Times per week: 2  Plan weeks: 12  Current Treatment Recommendations: Strengthening, ROM, Neuromuscular Re-education, Home Exercise Program, Manual Therapy - Soft Tissue Mobilization, Modalities, Manual Therapy - Joint Manipulation    Pt to continue current HEP. See objective section for any therapeutic exercise changes, additions or modifications this date.     PT Individual Minutes  Time In: 0930  Time Out: [address] [place]  Minutes: 59  Timed Code Treatment Minutes: 43 Minutes  Procedure Minutes:E-stim with CP x 10'      Timed Activity Minutes Units   Ther Ex 25 2   Manual  18 1       Signature:  Electronically signed by Mohinder Zayas PTA on 11/15/21 at 9:30 AM EST

## 2021-11-17 ENCOUNTER — HOSPITAL ENCOUNTER (OUTPATIENT)
Dept: PHYSICAL THERAPY | Age: 66
Setting detail: THERAPIES SERIES
Discharge: HOME | End: 2021-11-17
Payer: COMMERCIAL

## 2021-11-17 PROCEDURE — 97140 MANUAL THERAPY 1/> REGIONS: CPT

## 2021-11-17 PROCEDURE — 97110 THERAPEUTIC EXERCISES: CPT

## 2021-11-17 ASSESSMENT — PAIN SCALES - GENERAL: PAINLEVEL_OUTOF10: 0

## 2021-11-17 NOTE — PROGRESS NOTES
[address]  [address]  KMSKK:[phone]    [] Certification  [] Recertification []  Plan of Care  [x] Progress Note [] Discharge      To:  Dr Amparo Guevara      From:  Jamie Snow, PT  Patient: Shanthi Oquendo     : 1955  Diagnosis: Impingement syndrome R shoulder, OA, strain muscle, tendon long head bicep R s/p R RTC repair 10/4/21     Date: 2021  Treatment Diagnosis: R shoulder pain with strength/rom deficits s/p surgical repair       Progress Report Period from:  10/25/2021  to 2021    Total # of Visits to Date: 8   No Show: 0    Canceled Appointment: 0     OBJECTIVE:   Short Term Goals - Time Frame for Short term goals: 4 weeks    Goals Current/Discharge status  Met   Short term goal 1: The pt will demonstrate improved postural awareness requiring <25% VC's with exercises  Pt improving with postural awareness with minimal cues required throughout exercises [x] yes  [] no   Short term goal 2: Decrease R shoulder pain 50% to assist with improved functional gains. Pain Level: 0      [x] yes  [] no   Short term goal 3: Pain-free R shoulder PROM to WNL allowing an increase in ADL tolerance   PROM RUE (degrees)  RUE General PROM: Shoulder Flex 164, Abd 145, ER 50   [] yes  [x] no     Long Term Goals - Time Frame for Long term goals : 8-10 weeks  Goals Current/ Discharge status Met   Long term goal 1: Indep HEP for symptom management Pt independent with issued HEP. Will update with progression when appropriate. [x] yes  [] no   Long term goal 2: Pt demo improved overall function by reporting greater than 80% per functional survey score Exam: UEFI 47/80=58% functional [] yes  [x] no   Long term goal 3: Pain-free R shoulder AROM to WNL allowing an increase in ADL tolerance.    AROM RUE (degrees)  RUE General AROM: Shoulder Flex 140, Abd 130, IR  reach R iliac crest, ER base of occiput    [] yes  [x] no Long term goal 4: Improve R shoulder strength 4/5 to 4+/5 to allow patient to reach overhead for daily activities return to work  Strength RUE  Comment: Shoulder flex/abd 3/5, IR 4-/5, ER 3+/5, elbow 4/5       [] yes  [x] no     Body structures, Functions, Activity limitations: Decreased functional mobility , Decreased ADL status, Decreased ROM, Decreased strength, Increased pain, Decreased posture  Assessment: Pt continued progression through post op R RTC protocol. Incorporated prone shoulder extension and abduction for improving scapular strength. Tolerating T band lat pull, mid row, and IR. Demonstrates increased shoulder PROM, AROM in each plane. UEFI indicates increased function, advancing from 10 % to 58% function. Good candidate towards meeting long term goals and eventual RTW. Will continue implementing PRE's to facilitate deltoid, scapular strength. Prognosis: Good  Discharge Recommendations: Continue to assess pending progress    PLAN: [x] Progress note    Frequency/Duration:  Plan  Times per week: 2  Plan weeks: 12  Current Treatment Recommendations: Strengthening, ROM, Neuromuscular Re-education, Home Exercise Program, Manual Therapy - Soft Tissue Mobilization, Modalities, Manual Therapy - Joint Manipulation     Precautions:  follow RC protocol per Dr Mendy Garcia office guidelines                          Patient Status:[x] Continue plan of Care    [] Discharge PT. Recommend pt continue with HEP. [] Additional visits requested, Please re-certify for additional visits:          Signature: Electronically signed and objective measures taken by Thomas Hess, PTA on 11/17/21 at 9:55 AM EST    Electronically signed by Jesus Guerra, PT on 11/17/2021 at 10:04 AM    If you have any questions or concerns, please don't hesitate to call. Thank you for your referral.    I have reviewed this plan of care and certify a need for medically necessary rehabilitation services.     Physician Signature:__________________________________________________________  Date:  Please sign and return

## 2021-11-17 NOTE — PROGRESS NOTES
[address]  [address]  GBWNK:[phone]        Date: 2021  Patient: Carmen Villarreal  : 1955  ACCT #: [de-identified]  Referring Practitioner: Dr Eliud Ralph  Diagnosis: Impingement syndrome R shoulder, OA, strain muscle, tendon long head bicep R s/p R RTC repair 10/4/21  Treatment Diagnosis: R shoulder pain with strength/rom deficits s/p surgical repair    Visit Information:  PT Visit Information  Onset Date: 10/04/21  PT Insurance Information: BCBS  Total # of Visits Approved: 150 ([facility])  Total # of Visits to Date: 8  No Show: 0  Canceled Appointment: 0  Progress Note Counter:     Subjective: Pt states seems like shoulder has been tight this week. No c/o pain coming into PT.   Comments: RTD   HEP Compliance:  [x] Good [] Fair [] Poor [] Reports not doing due to:    Vital Signs  Patient Currently in Pain: Denies   Pain Screening  Patient Currently in Pain: Denies  Pain Assessment  Pain Assessment: 0-10  Pain Level: 0  Pain Location: Shoulder  Pain Orientation: Right  Functional Pain Assessment: Prevents or interferes with all active and some passive activities    OBJECTIVE:   Exercises  Exercise 1: Full PROM graduallly by  desired  Exercise 2: Phase III begin strengthening to start 21 isometrics, theraband, RC dumbell, flex, abduction, SL ER , prone ext/horizontal abd, ball stab on table  Exercise 3: pulleys x 10, 5 secs - held  Exercise 4: wand flex/ER/scap x 10, 5\" ea  Exercise 6: stand shl flex with 1# wt 10 x 3\"  Exercise 7: stand shl abd x 10, 3 secs  Exercise 8: tband mid row/ext with GTG x 10, 5\" ea  Exercise 9: tband IR (1 GTB) x 10, 5\"  Exercise 13: prone: shoulder ext, abduction(palm down) x 10 each, 3 secs  Exercise 20: HEP: issued shoulder isometrics, cane exercises, seated shoulder ROM(2021)    Strength: [] NT  [x] MMT completed:        Strength RUE  Comment: Shoulder flex/abd 3/5, IR 4-/5, ER 3+/5, elbow 4/5     ROM: [] NT  [x] ROM measurements:   PROM RUE (degrees)  RUE General PROM: Shoulder Flex 164, Abd 145, ER 50  AROM RUE (degrees)  RUE General AROM: Shoulder Flex 140, Abd 130, IR  reach R iliac crest, ER base of occiput      Manual:   Manual therapy  PROM: R shoulder PROM performed in flexion,abd,ER x 16 min    *Indicates exercise, modality, or manual techniques to be initiated when appropriate    Assessment: Body structures, Functions, Activity limitations: Decreased functional mobility , Decreased ADL status, Decreased ROM, Decreased strength, Increased pain, Decreased posture  Assessment: Pt continued progression through post op R RTC protocol. Incorporated prone shoulder extension and abduction for improving scapular strength. Tolerating T band lat pull, mid row, and IR. Demonstrates increased shoulder PROM, AROM in each plane. UEFI indicates increased function, advancing from 10 % to 58% function. Good candidate towards meeting long term goals and eventual RTW. Will continue implementing PRE's to facilitate deltoid, scapular strength. Treatment Diagnosis: R shoulder pain with strength/rom deficits s/p surgical repair  Prognosis: Good       Goals:  Short term goals  Time Frame for Short term goals: 4 weeks  Short term goal 1: The pt will demonstrate improved postural awareness requiring <25% VC's with exercises  Short term goal 2: Decrease R shoulder pain 50% to assist with improved functional gains. Short term goal 3: Pain-free R shoulder PROM to WNL allowing an increase in ADL tolerance    Long term goals  Time Frame for Long term goals : 8-10 weeks  Long term goal 1: Indep HEP for symptom management  Long term goal 2: Pt demo improved overall function by reporting greater than 80% per functional survey score  Long term goal 3: Pain-free R shoulder AROM to WNL allowing an increase in ADL tolerance.   Long term goal 4: Improve R shoulder strength 4/5 to 4+/5 to allow patient to reach overhead for daily activities return to work  Progress toward goals: See progress note    POST-PAIN       Pain Rating (0-10 pain scale):   0/10   Location and pain description same as pre-treatment unless indicated. Action: [x] NA   [] Perform HEP  [] Meds as prescribed  [] Modalities as prescribed   [] Call Physician     Frequency/Duration:  Plan  Times per week: 2  Plan weeks: 12  Current Treatment Recommendations: Strengthening, ROM, Neuromuscular Re-education, Home Exercise Program, Manual Therapy - Soft Tissue Mobilization, Modalities, Manual Therapy - Joint Manipulation     Pt to continue current HEP. See objective section for any therapeutic exercise changes, additions or modifications this date.     PT Individual Minutes  Time In: 0900  Time Out: 7204  Minutes: 50  Timed Code Treatment Minutes: 50 Minutes     Timed Activity Minutes Units   Ther Ex 34 2   Manual  16 1       Signature:  Electronically signed by Liudmila Adkins PTA on 11/17/21 at 9:23 AM EST

## 2021-11-22 ENCOUNTER — HOSPITAL ENCOUNTER (OUTPATIENT)
Dept: PHYSICAL THERAPY | Age: 66
Setting detail: THERAPIES SERIES
Discharge: HOME | End: 2021-11-22
Payer: COMMERCIAL

## 2021-11-22 PROCEDURE — 97140 MANUAL THERAPY 1/> REGIONS: CPT

## 2021-11-22 PROCEDURE — 97110 THERAPEUTIC EXERCISES: CPT

## 2021-11-22 ASSESSMENT — PAIN DESCRIPTION - LOCATION: LOCATION: SHOULDER

## 2021-11-22 ASSESSMENT — PAIN DESCRIPTION - ORIENTATION: ORIENTATION: RIGHT

## 2021-11-22 NOTE — PROGRESS NOTES
[address] [address]  [address]  KZYHL:[phone]        Date: 2021  Patient: Charmaine Dangelo  : 1955  ACCT #: [de-identified]  Referring Practitioner: Dr Kamilah Martinez  Diagnosis: Impingement syndrome R shoulder, OA, strain muscle, tendon long head bicep R s/p R RTC repair 10/4/21  Treatment Diagnosis: R shoulder pain with strength/rom deficits s/p surgical repair    Visit Information:  PT Visit Information  Onset Date: 10/04/21  PT Insurance Information: BCBS  Total # of Visits Approved: 150 ([facility])  Total # of Visits to Date: 9  No Show: 0  Canceled Appointment: 0  Progress Note Counter:     Subjective: Pt has no complaints entering clinic. Reports that left shoulder is doing well and continues to do his exercises at home daily.   Comments: RTD      HEP Compliance:  [x] Good [] Fair [] Poor [] Reports not doing due to:    Vital Signs  Patient Currently in Pain: Denies   Pain Screening  Patient Currently in Pain: Denies  Pain Assessment  Pain Location: Shoulder  Pain Orientation: Right    OBJECTIVE:   Exercises  Exercise 1: Full PROM graduallly by  desired  Exercise 2: Phase III begin strengthening to start 21 isometrics, theraband, RC dumbell, flex, abduction, SL ER , prone ext/horizontal abd, ball stab on table  Exercise 3: wand flex/ER/scap x 10, 5\" ea  Exercise 4: prone: shoulder ext, abduction(palm down) x 10 each, 3 secs  Exercise 6: stand shl flex with 1# wt 10 x 3\"  Exercise 7: stand shl abd 10 x 3\"  Exercise 8: tband mid row/ext with GTG x 10, 5\" ea  Exercise 9: tband IR/ER (1 GTB) x 10, 5\" ea  Exercise 10: small ball on wall: circles cw/ccw, up/down, side to side x 10 ea  Exercise 11: tball push ups on wall x 10  Exercise 12: R uni high row on CC 20# x 10  Exercise 20: HEP: issued shoulder isometrics, cane exercises, seated shoulder ROM(2021)    Strength: [x] NT  [] MMT completed:    ROM: [x] NT  [] ROM measurements:    Manual:   Manual therapy  PROM: R shoulder PROM performed in flexion,abd,ER x 15 min    *Indicates exercise, modality, or manual techniques to be initiated when appropriate    Assessment: Body structures, Functions, Activity limitations: Decreased functional mobility , Decreased ADL status, Decreased ROM, Decreased strength, Increased pain, Decreased posture  Assessment: Pt is progressing well with added ex's which included tband ER, ball circles on wall, tball push ups on wall, and uni high row on cable column. Pt tolerated increased intensity well but fatigued post.  Treatment Diagnosis: R shoulder pain with strength/rom deficits s/p surgical repair  Prognosis: Good    Goals:  Short term goals  Time Frame for Short term goals: 4 weeks  Short term goal 1: The pt will demonstrate improved postural awareness requiring <25% VC's with exercises  Short term goal 2: Decrease R shoulder pain 50% to assist with improved functional gains. Short term goal 3: Pain-free R shoulder PROM to WNL allowing an increase in ADL tolerance    Long term goals  Time Frame for Long term goals : 8-10 weeks  Long term goal 1: Indep HEP for symptom management  Long term goal 2: Pt demo improved overall function by reporting greater than 80% per functional survey score  Long term goal 3: Pain-free R shoulder AROM to WNL allowing an increase in ADL tolerance. Long term goal 4: Improve R shoulder strength 4/5 to 4+/5 to allow patient to reach overhead for daily activities return to work  Progress toward goals: ongoing, progressed ex's to work toward LTG 4     POST-PAIN       Pain Rating (0-10 pain scale):   0/10   Location and pain description same as pre-treatment unless indicated.    Action: [x] NA   [] Perform HEP  [] Meds as prescribed  [] Modalities as prescribed   [] Call Physician     Frequency/Duration:  Plan  Times per week: 2  Plan weeks: 12  Current Treatment Recommendations: Strengthening, ROM, Neuromuscular Re-education, Home Exercise Program, Manual Therapy - Soft Tissue Mobilization, Modalities, Manual Therapy - Joint Manipulation    Pt to continue current HEP. See objective section for any therapeutic exercise changes, additions or modifications this date.     PT Individual Minutes  Time In: 3256  Time Out: 8897  Minutes: 47  Timed Code Treatment Minutes: 45 Minutes      Timed Activity Minutes Units   Ther Ex 30 2   Manual  15 1       Signature:  Electronically signed by Anabella Walton PTA on 11/22/21 at 9:21 AM EST

## 2021-11-24 ENCOUNTER — HOSPITAL ENCOUNTER (OUTPATIENT)
Dept: PHYSICAL THERAPY | Age: 66
Setting detail: THERAPIES SERIES
Discharge: HOME | End: 2021-11-24
Payer: COMMERCIAL

## 2021-11-24 PROCEDURE — 97110 THERAPEUTIC EXERCISES: CPT

## 2021-11-24 ASSESSMENT — PAIN SCALES - GENERAL: PAINLEVEL_OUTOF10: 0

## 2021-11-24 ASSESSMENT — PAIN DESCRIPTION - LOCATION: LOCATION: SHOULDER

## 2021-11-24 NOTE — PROGRESS NOTES
[address] [address]  [facility]  XPLVS:[phone]        Date: 2021  Patient: Isabela Anderson  : 1955  ACCT #: [de-identified]  Referring Practitioner: Dr Chavez Suarez  Diagnosis: Impingement syndrome R shoulder, OA, strain muscle, tendon long head bicep R s/p R RTC repair 10/4/21  Treatment Diagnosis: R shoulder pain with strength/rom deficits s/p surgical repair    Visit Information:  PT Visit Information  Onset Date: 10/04/21  PT Insurance Information: BCBS  Total # of Visits Approved: 150 ([facility] [place])  Total # of Visits to Date: 10  No Show: 0  Canceled Appointment: 0  Progress Note Counter: 10/18-24    Subjective: Pt states shoulder is doing good. No complaints of pain, just some soreness after PT. Comments: RTD 2021, RTW ?   HEP Compliance:  [x] Good [] Fair [] Poor [] Reports not doing due to:    Vital Signs  Patient Currently in Pain: No   Pain Screening  Patient Currently in Pain: No  Pain Assessment  Pain Assessment: 0-10  Pain Level: 0  Pain Location: Shoulder    OBJECTIVE:   Exercises  Exercise 1: Full PROM graduallly by  desired  Exercise 2: Phase III begin strengthening to start 21 isometrics, theraband, RC dumbell, flex, abduction, SL ER , prone ext/horizontal abd, ball stab on table  Exercise 3: UBE x 2 mins forward, x 2 mins reverse  Exercise 4: prone: shoulder ext 1 #, abduction(palm down) 2 x 10 each, 3 secs  Exercise 5: wand flex/ER/scap x 10, 5\" ea  Exercise 6: stand shl flex with 1# wt 10 x 3\"  Exercise 7: stand shl abd 10 x 3\"  Exercise 8: tband mid row/ext with GTB x 10, 5\" ea  Exercise 9: tband IR/ER (1 GTB) x 10, 5\" ea  Exercise 10: small ball on wall: circles cw/ccw, up/down, side to side x 10 ea  Exercise 11: tball push ups on wall x 10  Exercise 12: R uni high row on CC 20# x 10  Exercise 20: HEP: issued shoulder isometrics, cane exercises, seated shoulder ROM(2021)       Strength: [x] NT  [] MMT completed:     ROM: [] NT  [x] ROM measurements:   PROM RUE (degrees)  RUE General PROM: Shoulder Flex 170, Abd 160, ER 50  AROM RUE (degrees)  RUE General AROM: Shoulder Flex 165, Abd 150, IR  reach L 4, ER base of occiput     *Indicates exercise, modality, or manual techniques to be initiated when appropriate    Assessment: Body structures, Functions, Activity limitations: Decreased functional mobility , Decreased ADL status, Decreased ROM, Decreased strength, Increased pain, Decreased posture  Assessment: Pt is progressing well with added ex's which included tband ER, ball circles on wall, tball push ups on wall, and uni high row on cable column. Pt tolerated increased intensity well but fatigued post.  Treatment Diagnosis: R shoulder pain with strength/rom deficits s/p surgical repair  Prognosis: Good     Goals:  Short term goals  Time Frame for Short term goals: 4 weeks  Short term goal 1: The pt will demonstrate improved postural awareness requiring <25% VC's with exercises  Short term goal 2: Decrease R shoulder pain 50% to assist with improved functional gains. Short term goal 3: Pain-free R shoulder PROM to WNL allowing an increase in ADL tolerance    Long term goals  Time Frame for Long term goals : 8-10 weeks  Long term goal 1: Indep HEP for symptom management  Long term goal 2: Pt demo improved overall function by reporting greater than 80% per functional survey score  Long term goal 3: Pain-free R shoulder AROM to WNL allowing an increase in ADL tolerance. Long term goal 4: Improve R shoulder strength 4/5 to 4+/5 to allow patient to reach overhead for daily activities return to work  Progress toward goals: Pt continued progression through post op RTC protocol performing prone scapular strengthening exercises. POST-PAIN       Pain Rating (0-10 pain scale):   0/10   Location and pain description same as pre-treatment unless indicated.    Action: [x] NA   [] Perform HEP  [] Meds as prescribed [] Modalities as prescribed   [] Call Physician     Frequency/Duration:  Plan  Times per week: 2  Plan weeks: 12  Current Treatment Recommendations: Strengthening, ROM, Neuromuscular Re-education, Home Exercise Program, Manual Therapy - Soft Tissue Mobilization, Modalities, Manual Therapy - Joint Manipulation     Pt to continue current HEP. See objective section for any therapeutic exercise changes, additions or modifications this date.     PT Individual Minutes  Time In: 2900  Time Out: 1010  Minutes: 41  Timed Code Treatment Minutes: 41 Minutes     Timed Activity Minutes Units   Ther Ex 41 3     Signature:  Electronically signed by Dallis Boast, PTA on 11/24/21 at 9:39 AM EST

## 2021-11-29 ENCOUNTER — HOSPITAL ENCOUNTER (OUTPATIENT)
Dept: PHYSICAL THERAPY | Age: 66
Setting detail: THERAPIES SERIES
Discharge: HOME | End: 2021-11-29
Payer: COMMERCIAL

## 2021-11-29 PROCEDURE — 97110 THERAPEUTIC EXERCISES: CPT

## 2021-11-29 NOTE — PROGRESS NOTES
[address]  [address]  SZGYT:[phone]        Date: 2021  Patient: Marlyn Ponce  : 1955  ACCT #: [de-identified]  Referring Practitioner: Dr Cheng Hallman  Diagnosis: Impingement syndrome R shoulder, OA, strain muscle, tendon long head bicep R s/p R RTC repair 10/4/21  Treatment Diagnosis: R shoulder pain with strength/rom deficits s/p surgical repair    Visit Information:  PT Visit Information  Onset Date: 10/04/21  PT Insurance Information: BCBS  Total # of Visits Approved: 150 ([facility] [place])  Total # of Visits to Date: 11  No Show: 0  Canceled Appointment: 0  Progress Note Counter:     Subjective: Pt states shoulder is doing good. No pain. \"The strength just isn't back yet. \"  Comments: RTD 2021, RTW ?   HEP Compliance:  [x] Good [] Fair [] Poor [] Reports not doing due to:    Vital Signs  Patient Currently in Pain: Denies   Pain Screening  Patient Currently in Pain: Denies    OBJECTIVE:   Exercises  Exercise 1: Full PROM graduallly by  desired  Exercise 2: Phase III begin strengthening to start 21 isometrics, theraband, RC dumbell, flex, abduction, SL ER , prone ext/horizontal abd, ball stab on table  Exercise 3: UBE x 2 mins forward, x 2 mins reverse  Exercise 4: prone: shoulder ext 2#, abduction(palm down) 2 x 10 each, 3 secs  Exercise 5: wand flex/ER/scap x 10, 5\" ea  Exercise 6: stand shl flex,  2# 7 x 3\", 1# 3 x 3\"  Exercise 7: stand shl abd, 1# 6 x 3, 0# 4 x 3\"  Exercise 8: tband mid row/ext with GTB x 10, 5\" ea  Exercise 9: tband IR x 10, (2 GTB), ER BTB x 10, 5\" ea  Exercise 10: small ball on wall: circles cw/ccw, up/down, side to side x 10 ea  Exercise 12: R uni high row on CC 20# x 10  Exercise 20: HEP: issued shoulder isometrics, cane exercises, seated shoulder ROM(2021)    Strength: [x] NT  [] MMT completed:    ROM: [] NT   [x] ROM measurements:  PROM RUE (degrees)  RUE General PROM: Shoulder Flex 170, Abd 170, ER 50  AROM RUE (degrees)  RUE General AROM: Shoulder Flex 165, Abd 150, IR  reach L 4, ER base of occiput    *Indicates exercise, modality, or manual techniques to be initiated when appropriate    Assessment: Body structures, Functions, Activity limitations: Decreased functional mobility , Decreased ADL status, Decreased ROM, Decreased strength, Increased pain, Decreased posture  Assessment: Pt tolerating progressions of R shoulder stab ex's well. Challenged with increased weight during prone shoulder ext and stand flex/abd. Pt reported fatigue post TE but denied pain. Treatment Diagnosis: R shoulder pain with strength/rom deficits s/p surgical repair  Prognosis: Good    Goals:  Short term goals  Time Frame for Short term goals: 4 weeks  Short term goal 1: The pt will demonstrate improved postural awareness requiring <25% VC's with exercises  Short term goal 2: Decrease R shoulder pain 50% to assist with improved functional gains. Short term goal 3: Pain-free R shoulder PROM to WNL allowing an increase in ADL tolerance    Long term goals  Time Frame for Long term goals : 8-10 weeks  Long term goal 1: Indep HEP for symptom management  Long term goal 2: Pt demo improved overall function by reporting greater than 80% per functional survey score  Long term goal 3: Pain-free R shoulder AROM to WNL allowing an increase in ADL tolerance. Long term goal 4: Improve R shoulder strength 4/5 to 4+/5 to allow patient to reach overhead for daily activities return to work  Progress toward goals: ongoing, showing improvements in R shoulder ROM     POST-PAIN       Pain Rating (0-10 pain scale):   0/10   Location and pain description same as pre-treatment unless indicated.    Action: [x] NA   [] Perform HEP  [] Meds as prescribed  [] Modalities as prescribed   [] Call Physician     Frequency/Duration:  Plan  Times per week: 2  Plan weeks: 12  Current Treatment Recommendations: Strengthening, ROM, Neuromuscular Re-education, Home Exercise Program, Manual Therapy - Soft Tissue Mobilization, Modalities, Manual Therapy - Joint Manipulation    Pt to continue current HEP. See objective section for any therapeutic exercise changes, additions or modifications this date.     PT Individual Minutes  Time In: 4831  Time Out: 1315  Minutes: 40  Timed Code Treatment Minutes: 40 Minutes     Timed Activity Minutes Units   Ther Ex 40 3       Signature:  Electronically signed by Ernesto Cantu PTA on 11/29/21 at 9:48 AM EST

## 2021-11-30 ENCOUNTER — OFFICE VISIT (OUTPATIENT)
Dept: FAMILY MEDICINE CLINIC | Age: 66
End: 2021-11-30
Payer: COMMERCIAL

## 2021-11-30 VITALS
BODY MASS INDEX: 30.31 KG/M2 | TEMPERATURE: 96.6 F | SYSTOLIC BLOOD PRESSURE: 128 MMHG | WEIGHT: 200 LBS | DIASTOLIC BLOOD PRESSURE: 82 MMHG | HEIGHT: 68 IN | OXYGEN SATURATION: 97 % | HEART RATE: 87 BPM

## 2021-11-30 DIAGNOSIS — I10 ESSENTIAL HYPERTENSION: Primary | ICD-10-CM

## 2021-11-30 DIAGNOSIS — M25.50 ARTHRALGIA, UNSPECIFIED JOINT: ICD-10-CM

## 2021-11-30 DIAGNOSIS — R73.9 HYPERGLYCEMIA: ICD-10-CM

## 2021-11-30 DIAGNOSIS — Z12.5 PROSTATE CANCER SCREENING: ICD-10-CM

## 2021-11-30 DIAGNOSIS — K21.9 LARYNGITIS FROM REFLUX OF STOMACH ACID: ICD-10-CM

## 2021-11-30 DIAGNOSIS — J04.0 LARYNGITIS FROM REFLUX OF STOMACH ACID: ICD-10-CM

## 2021-11-30 DIAGNOSIS — E78.2 MIXED HYPERLIPIDEMIA: ICD-10-CM

## 2021-11-30 DIAGNOSIS — Z23 NEED FOR INFLUENZA VACCINATION: ICD-10-CM

## 2021-11-30 DIAGNOSIS — J30.9 ALLERGIC RHINITIS, UNSPECIFIED SEASONALITY, UNSPECIFIED TRIGGER: ICD-10-CM

## 2021-11-30 PROCEDURE — 90674 CCIIV4 VAC NO PRSV 0.5 ML IM: CPT | Performed by: PHYSICIAN ASSISTANT

## 2021-11-30 PROCEDURE — 99214 OFFICE O/P EST MOD 30 MIN: CPT | Performed by: PHYSICIAN ASSISTANT

## 2021-11-30 PROCEDURE — 90471 IMMUNIZATION ADMIN: CPT | Performed by: PHYSICIAN ASSISTANT

## 2021-11-30 RX ORDER — LANOLIN ALCOHOL/MO/W.PET/CERES
CREAM (GRAM) TOPICAL
COMMUNITY

## 2021-11-30 RX ORDER — CHLORAL HYDRATE 500 MG
CAPSULE ORAL
COMMUNITY

## 2021-11-30 RX ORDER — CELECOXIB 200 MG/1
CAPSULE ORAL
Qty: 60 CAPSULE | Refills: 0 | Status: SHIPPED | OUTPATIENT
Start: 2021-11-30 | End: 2021-12-03 | Stop reason: SDUPTHER

## 2021-11-30 RX ORDER — ACETAMINOPHEN 500 MG
TABLET ORAL
COMMUNITY
Start: 2021-10-04

## 2021-11-30 RX ORDER — OMEPRAZOLE 20 MG/1
CAPSULE, DELAYED RELEASE ORAL
Qty: 270 CAPSULE | Refills: 3 | Status: SHIPPED | OUTPATIENT
Start: 2021-11-30 | End: 2022-04-21 | Stop reason: SDUPTHER

## 2021-11-30 RX ORDER — MAGNESIUM OXIDE 400 MG/1
1 TABLET ORAL DAILY
COMMUNITY

## 2021-11-30 RX ORDER — FLUTICASONE PROPIONATE 50 MCG
1 SPRAY, SUSPENSION (ML) NASAL DAILY
Qty: 1 EACH | Refills: 5 | Status: SHIPPED | OUTPATIENT
Start: 2021-11-30

## 2021-11-30 ASSESSMENT — ENCOUNTER SYMPTOMS
SORE THROAT: 0
SINUS PAIN: 0
DIARRHEA: 0
CONSTIPATION: 0
ABDOMINAL PAIN: 0
ALLERGIC/IMMUNOLOGIC NEGATIVE: 1
COUGH: 0
BACK PAIN: 0
SINUS PRESSURE: 0
VOMITING: 0
NAUSEA: 0
SHORTNESS OF BREATH: 0
EYE PAIN: 0
EYE DISCHARGE: 0
CHEST TIGHTNESS: 0
WHEEZING: 0

## 2021-11-30 NOTE — PROGRESS NOTES
Subjective  Ted Loveless, 61 y.o. male presents today with:  Chief Complaint   Patient presents with   [facility] Establish Care     Former patient of Dr. Chanda Hernandez. He is here today to establish care.  Hypertension     6 month follow up. He did not have labs done from 8/2021.  Hyperlipidemia     6 month follow up       HPI  Patient is here for follow-up on hypertension denies chest pain pressure shortness of breath been suffering with bilateral shoulder pain rotator cuff tear due to work-related injury patient currently out of work as result  Asking for refill on of Celebrex which he states works better than the Voltaren prescribed by orthopedic  He has reduced  use of Prilosec to 1 to 2 tablets daily which has been effective    Review of Systems   Constitutional: Negative for chills, fatigue and fever. HENT: Negative for congestion, ear discharge, ear pain, sinus pressure, sinus pain and sore throat. Eyes: Negative for pain and discharge. Respiratory: Negative for cough, chest tightness, shortness of breath and wheezing. Cardiovascular: Negative for chest pain and leg swelling. Gastrointestinal: Negative for abdominal pain, constipation, diarrhea, nausea and vomiting. Endocrine: Negative. Genitourinary: Negative for difficulty urinating, dysuria, frequency and urgency. Musculoskeletal: Positive for arthralgias. Negative for back pain and neck pain. Skin: Negative for rash. Allergic/Immunologic: Negative. Neurological: Negative for dizziness and headaches. Hematological: Negative. Psychiatric/Behavioral: Negative. All other systems reviewed and are negative.         Past Medical History:   Diagnosis Date    Anemia     CHF (congestive heart failure) ([facility] [address]     Patient denies hx    Depression     Essential hypertension     Fatty liver     GERD (gastroesophageal reflux disease)     Hepatitis C without hepatic coma     Hepatitis C without hepatic coma     Hyperlipidemia     meds > 5 yrs    Hypertension     meds > 5 yrs    Insomnia     Osteoarthritis     Osteoarthritis of spine with radiculopathy, lumbar region 1/19/2016    Prostate cancer ([facility] [address] 2019    OR -- no chemo or radiation    Prostate cancer [facility])      Past Surgical History:   Procedure Laterality Date    BACK SURGERY  2005    lumbar disc OR / fusion    BACK SURGERY  2016    fusion    CARPAL TUNNEL RELEASE Bilateral 2005    CARPAL TUNNEL RELEASE      COLONOSCOPY  3-2014-PROBABLY 08 not14    (-), 2008 POLYPS    COLONOSCOPY N/A 8/9/2019    COLORECTAL CANCER SCREENING, NOT HIGH RISK performed by Alex Madera MD at [address] [facility], COLON, DIAGNOSTIC      HIP SURGERY Right 9/10/2021    RIGHT HIP JOINT INJECTION W/XRAY. CORTISONE INJECTION   LOCAL. 1:00PM performed by Cisco Adkins MD at [address]  2012    RTKR    JOINT REPLACEMENT  2018    LTKR    JOINT REPLACEMENT      KNEE ARTHROSCOPY Bilateral 2014    KNEE SURGERY Left     before LTKR    LUMBAR FUSION N/A 9/28/2020    REMOVAL OF HARDWARE AT L3-4 L2,L3,L4 INSTRUMENTATION L2-3 PLIF L3-4 POSTEROLATERAL FUSION performed by Liborio Gunderson MD at [address] [facility] [address] [facility] [address]  2/24/16    [facility] [address] [facility] [place] TOTAL KNEE ARTHROPLASTY Left 6/18/2018    LEFT KNEE TOTAL KNEE  ARTHROPLASTY, SUPINE, BLOCK PER TKA PROTOCOL, performed by Shauna Dawkins MD at [address] [facility]  06/2020    [facility]    ROTATOR CUFF REPAIR Right 2006    ROTATOR CUFF REPAIR Right 10/04/2021    SHOULDER SURGERY      SPINE SURGERY      TONSILLECTOMY  1964    TOTAL KNEE ARTHROPLASTY Right 11-3-14    ULTRASOUND PROSTATE/TRANSRECTAL N/A 3/20/2019    PROSTATE TRANSRECTAL ULTRASOUND BIOPSY performed by Kamila Martinez MD at [address] [facility] Marital status:      Spouse name: Not on file    Number of children: Not on file    Years of education: Not on file    Highest education level: Not on file Occupational History    Not on file   Tobacco Use    Smoking status: Current Every Day Smoker     Packs/day: 0.33     Years: 42.00     Pack years: 13.86     Types: Cigarettes, Cigars     Start date:      Last attempt to quit: 3/1/2020     Years since quittin.7    Smokeless tobacco: Never Used    Tobacco comment: Smokes 5 cigars a week   Vaping Use    Vaping Use: Never used   Substance and Sexual Activity    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    Drug use: No    Sexual activity: Yes     Partners: Female   Other Topics Concern    Not on file   Social History Narrative    Not on file     Social Determinants of Health     Financial Resource Strain: Low Risk     Difficulty of Paying Living Expenses: Not hard at all   Food Insecurity: No Food Insecurity    Worried About Running Out of Food in the Last Year: Never true    Abdulkadir of Food in the Last Year: Never true   Transportation Needs: No Transportation Needs    Lack of Transportation (Medical): No    Lack of Transportation (Non-Medical):  No   Physical Activity:     Days of Exercise per Week: Not on file    Minutes of Exercise per Session: Not on file   Stress:     Feeling of Stress : Not on file   Social Connections:     Frequency of Communication with Friends and Family: Not on file    Frequency of Social Gatherings with Friends and Family: Not on file    Attends Islam Services: Not on file    Active Member of Clubs or Organizations: Not on file    Attends Club or Organization Meetings: Not on file    Marital Status: Not on file   Intimate Partner Violence:     Fear of Current or Ex-Partner: Not on file    Emotionally Abused: Not on file    Physically Abused: Not on file    Sexually Abused: Not on file   Housing Stability:     Unable to Pay for Housing in the Last Year: Not on file    Number of [place] in the Last Year: Not on file    Unstable Housing in the Last Year: Not on file     Family History   Problem Relation Age of Onset    Thyroid Disease Mother     Macular Degen Mother     Arthritis Mother     High Blood Pressure Father     Heart Attack Father     Arthritis Father     Heart Disease Father     Cancer Father     No Known Problems Brother     Other Brother         viral pneumonia at age 25s    No Known Problems Son       No Known Allergies  Current Outpatient Medications   Medication Sig Dispense Refill    Ascorbic Acid ER 1000 MG TBCR Take 1 tablet by mouth 2 times daily      VITAMIN D PO Cholecalciferol (Vitamin D3) * (VITAMIN D325 MC3) 25 MCG (1,000 UNIT) TABLET Active 25 MCG PO EVERY DAY      Boswellia-Glucosamine-Vit D (OSTEO BI-FLEX ONE PER DAY PO) Glucosamine/D3/Boswellia Ximena (OSTEO BI-FLEX1 EAC2) 1 EACH TABLET Active 2 EACH PO HS      Multiple Vitamin (ONE-A-DAY MENS PO) Multivit,Calc,Min/FA/K1/Lycop (ONE-A-DAY MEN'1 EAC5) 240 MCG-30 MCG-300 MCG TABLET Active 1 EACH PO EVERY DAY      Omega-3 Fatty Acids (FISH OIL) 1000 MG CAPS Omega-3 Fatty Acids * (FISH OIL 1,0001 EAC6) 1,000 MG CAPSULE Active 1000 MG PO EVERY DAY      Coenzyme Q10 (COQ-10 PO) Ubidecarenone (COQ-98630 MG) 100 MG CAPSULE Active 200 MG PO EVERY DAY      acetaminophen (TYLENOL) 500 MG tablet Acetaminophen * ([id number] M1) 500 MG TABLET Active 1000 MG PO EVERY 8 HOURS 42 7 October 4th, 2021 1:37pm      magnesium oxide (MAG-OX) 400 MG tablet Take 1 tablet by mouth daily      melatonin 3 MG TABS tablet Melatonin * (MELATONIN3 MG) 3 MG TABLET Active 3 MG PO AT BEDTIME AS NEEDED as needed for SLEEP      omeprazole (PRILOSEC) 20 MG delayed release capsule As directedTAKE THREE CAPSULES BY MOUTH EVERY DAY FOR STOMACH 270 capsule 3    fluticasone (FLONASE) 50 MCG/ACT nasal spray 1 spray by Nasal route daily 1 each 5    celecoxib (CELEBREX) 200 MG capsule TAKE 1 CAPSULE BY MOUTH 2 TIMES A DAY 60 capsule 0    metoprolol succinate (TOPROL XL) 100 MG extended release tablet One tablet daily 30 tablet 0    atorvastatin (LIPITOR) 20 MG tablet TAKE 1 TABLET BY MOUTH ONE TIME A DAY 30 tablet 0     No current facility-administered medications for this visit. Objective    Vitals:    11/30/21 1527   BP: 128/82   Site: Left Upper Arm   Position: Sitting   Cuff Size: Small Adult   Pulse: 87   Temp: 96.6 °F (35.9 °C)   SpO2: 97%   Weight: 200 lb (90.7 kg)   Height: 5' 8\" (1.727 m)     Physical Exam  Constitutional:       Appearance: He is obese. HENT:      Head: Normocephalic and atraumatic. Right Ear: External ear normal.      Left Ear: External ear normal.      Nose: Nose normal.   Eyes:      Extraocular Movements: Extraocular movements intact. Conjunctiva/sclera: Conjunctivae normal.      Pupils: Pupils are equal, round, and reactive to light. Neck:      Thyroid: No thyromegaly. Cardiovascular:      Rate and Rhythm: Normal rate and regular rhythm. Heart sounds: Normal heart sounds. No murmur heard. Pulmonary:      Effort: Pulmonary effort is normal. No respiratory distress. Breath sounds: Normal breath sounds. No wheezing, rhonchi or rales. Abdominal:      General: Bowel sounds are normal.      Palpations: Abdomen is soft. There is no mass. Tenderness: There is no abdominal tenderness. There is no guarding. Musculoskeletal:         General: Deformity present. Cervical back: Normal range of motion and neck supple. Comments: Degenerative changes digits both hands   Lymphadenopathy:      Cervical: No cervical adenopathy. Skin:     General: Skin is warm and dry. Coloration: Skin is not jaundiced or pale. Neurological:      General: No focal deficit present. Mental Status: He is alert and oriented to person, place, and time. Motor: No weakness. Coordination: Coordination normal.      Gait: Gait normal.   Psychiatric:         Mood and Affect: Mood normal.         Behavior: Behavior normal.         Thought Content:  Thought content normal.         Judgment: Judgment normal.             Vaccine Information Sheet, \"Influenza - Inactivated\"  given to Edgardo Felty, or parent/legal guardian of  Edgardo Felty and verbalized understanding. Patient responses:    Have you ever had a reaction to a flu vaccine? No  Are you able to eat eggs without adverse effects? Yes  Do you have any current illness? No  Have you ever had [place] Syndrome? No    Flu vaccine given per order. Please see immunization tab. Assessment & Plan    Diagnosis Orders   1. Essential hypertension  Comprehensive Metabolic Panel    CBC Auto Differential    controlled   2. Laryngitis from reflux of stomach acid  omeprazole (PRILOSEC) 20 MG delayed release capsule   3. Mixed hyperlipidemia  Lipid, Fasting   4. Hyperglycemia  Hemoglobin A1C   5. Allergic rhinitis, unspecified seasonality, unspecified trigger  fluticasone (FLONASE) 50 MCG/ACT nasal spray   6. Arthralgia, unspecified joint  celecoxib (CELEBREX) 200 MG capsule   7. Prostate cancer screening  PSA screening   8.  Need for influenza vaccination  INFLUENZA, MDCK QUADV, 2 YRS AND OLDER, IM, PF, PREFILL SYR OR SDV, 0.5ML (FLUCELVAX QUADV, PF)         Orders Placed This Encounter   Procedures    INFLUENZA, MDCK QUADV, 2 YRS AND OLDER, IM, PF, PREFILL SYR OR SDV, 0.5ML (FLUCELVAX QUADV, PF)    Lipid, Fasting     Standing Status:   Future     Standing Expiration Date:   11/30/2022    Comprehensive Metabolic Panel     Standing Status:   Future     Standing Expiration Date:   11/30/2022    CBC Auto Differential     Standing Status:   Future     Standing Expiration Date:   11/30/2022    PSA screening     Standing Status:   Future     Standing Expiration Date:   11/30/2022    Hemoglobin A1C     Standing Status:   Future     Standing Expiration Date:   11/30/2022     Orders Placed This Encounter   Medications    omeprazole (PRILOSEC) 20 MG delayed release capsule     Sig: As directedTAKE THREE CAPSULES BY MOUTH EVERY DAY FOR STOMACH Dispense:  270 capsule     Refill:  3     No more refills until a follow up appointment is scheduled.  fluticasone (FLONASE) 50 MCG/ACT nasal spray     Si spray by Nasal route daily     Dispense:  1 each     Refill:  5    celecoxib (CELEBREX) 200 MG capsule     Sig: TAKE 1 CAPSULE BY MOUTH 2 TIMES A DAY     Dispense:  60 capsule     Refill:  0     Medications Discontinued During This Encounter   Medication Reason    fluticasone (FLONASE) 50 MCG/ACT nasal spray REORDER    omeprazole (PRILOSEC) 20 MG delayed release capsule REORDER    celecoxib (CELEBREX) 200 MG capsule REORDER     Return in about 6 months (around 2022).     Rosey Abreu PA-C

## 2021-12-01 ENCOUNTER — HOSPITAL ENCOUNTER (OUTPATIENT)
Dept: PHYSICAL THERAPY | Age: 66
Setting detail: THERAPIES SERIES
Discharge: HOME | End: 2021-12-01
Payer: COMMERCIAL

## 2021-12-01 DIAGNOSIS — Z12.5 PROSTATE CANCER SCREENING: ICD-10-CM

## 2021-12-01 DIAGNOSIS — R73.9 HYPERGLYCEMIA: ICD-10-CM

## 2021-12-01 DIAGNOSIS — E78.2 MIXED HYPERLIPIDEMIA: ICD-10-CM

## 2021-12-01 DIAGNOSIS — I10 ESSENTIAL HYPERTENSION: ICD-10-CM

## 2021-12-01 LAB
ALBUMIN SERPL-MCNC: 4.4 G/DL (ref 3.5–4.6)
ALP BLD-CCNC: 121 U/L (ref 35–104)
ALT SERPL-CCNC: 49 U/L (ref 0–41)
ANION GAP SERPL CALCULATED.3IONS-SCNC: 15 MEQ/L (ref 9–15)
AST SERPL-CCNC: 32 U/L (ref 0–40)
BASOPHILS ABSOLUTE: 0 K/UL (ref 0–0.2)
BASOPHILS RELATIVE PERCENT: 0.5 %
BILIRUB SERPL-MCNC: <0.2 MG/DL (ref 0.2–0.7)
BUN BLDV-MCNC: 21 MG/DL (ref 8–23)
CALCIUM SERPL-MCNC: 9.5 MG/DL (ref 8.5–9.9)
CHLORIDE BLD-SCNC: 104 MEQ/L (ref 95–107)
CHOLESTEROL, FASTING: 146 MG/DL (ref 0–199)
CO2: 22 MEQ/L (ref 20–31)
CREAT SERPL-MCNC: 1.02 MG/DL (ref 0.7–1.2)
EOSINOPHILS ABSOLUTE: 0.2 K/UL (ref 0–0.7)
EOSINOPHILS RELATIVE PERCENT: 2.3 %
GFR AFRICAN AMERICAN: >60
GFR NON-AFRICAN AMERICAN: >60
GLOBULIN: 2.6 G/DL (ref 2.3–3.5)
GLUCOSE BLD-MCNC: 80 MG/DL (ref 70–99)
HBA1C MFR BLD: 6 % (ref 4.8–5.9)
HCT VFR BLD CALC: 45 % (ref 42–52)
HDLC SERPL-MCNC: 41 MG/DL (ref 40–59)
HEMOGLOBIN: 15.1 G/DL (ref 14–18)
LDL CHOLESTEROL CALCULATED: 89 MG/DL (ref 0–129)
LYMPHOCYTES ABSOLUTE: 1.3 K/UL (ref 1–4.8)
LYMPHOCYTES RELATIVE PERCENT: 15.7 %
MCH RBC QN AUTO: 30 PG (ref 27–31.3)
MCHC RBC AUTO-ENTMCNC: 33.5 % (ref 33–37)
MCV RBC AUTO: 89.8 FL (ref 80–100)
MONOCYTES ABSOLUTE: 0.7 K/UL (ref 0.2–0.8)
MONOCYTES RELATIVE PERCENT: 8.3 %
NEUTROPHILS ABSOLUTE: 5.9 K/UL (ref 1.4–6.5)
NEUTROPHILS RELATIVE PERCENT: 73.2 %
PDW BLD-RTO: 14.7 % (ref 11.5–14.5)
PLATELET # BLD: 258 K/UL (ref 130–400)
POTASSIUM SERPL-SCNC: 4.5 MEQ/L (ref 3.4–4.9)
PROSTATE SPECIFIC ANTIGEN: 0.01 NG/ML (ref 0–4)
RBC # BLD: 5.01 M/UL (ref 4.7–6.1)
SODIUM BLD-SCNC: 141 MEQ/L (ref 135–144)
TOTAL PROTEIN: 7 G/DL (ref 6.3–8)
TRIGLYCERIDE, FASTING: 82 MG/DL (ref 0–150)
WBC # BLD: 8 K/UL (ref 4.8–10.8)

## 2021-12-01 PROCEDURE — 97110 THERAPEUTIC EXERCISES: CPT

## 2021-12-01 NOTE — PROGRESS NOTES
[address]  [address]  RZVHP:[phone]        Date: 2021  Patient: Paula Vaughn  : 1955  ACCT #: [de-identified]  Referring Practitioner: Dr Kajal Frias  Diagnosis: Impingement syndrome R shoulder, OA, strain muscle, tendon long head bicep R s/p R RTC repair 10/4/21  Treatment Diagnosis: R shoulder pain with strength/rom deficits s/p surgical repair    Visit Information:  PT Visit Information  Onset Date: 10/04/21  PT Insurance Information: BCBS  Total # of Visits Approved: 150 ([facility])  Total # of Visits to Date: 12  No Show: 0  Canceled Appointment: 0  Progress Note Counter:     Subjective: pt notes shoulder feeling good, no pain. Just need to work on strength. Comments: RTD 2021, RTW ?   HEP Compliance:  [x] Good [] Fair [] Poor [] Reports not doing due to:    Vital Signs  Patient Currently in Pain: Denies   Pain Screening  Patient Currently in Pain: Denies    OBJECTIVE:   Exercises  Exercise 1: Full PROM graduallly by  desired  Exercise 2: Phase III begin strengthening to start 21 isometrics, theraband, RC dumbell, flex, abduction, SL ER , prone ext/horizontal abd, ball stab on table  Exercise 3: UBE x 2 mins forward, x 2 mins reverse  Exercise 4: prone: shoulder ext 2# 2 x 10, abduction(palm down) 2 x 10, row 2 x 10 2# wt  Exercise 5: wand flex/ER/scap x 10, 5\" ea  Exercise 6: stand shl flex,  2# 7 x 3\", 1# 3 x 3\" (held fatigue)  Exercise 7: stand shl abd, 1# 6 x 3, 0# 4 x 3\" (held fatigue)  Exercise 8: tband mid row/ext with BTB x 15, 5\" ea  Exercise 9: tband IR x 10; BTB, ER x 10 GTB x 10, 5\" ea  Exercise 10: small ball on wall: circles cw/ccw, up/down, side to side x 10 ea  Exercise 12: R uni high row on CC 20# x 10  Exercise 13: R shoulder adduction with CC 15# x 10  Exercise 20: HEP: issued shoulder isometrics, cane exercises, seated shoulder ROM(2021)    Strength: [x] NT  [] MMT completed: Action: [x] NA   [] Perform HEP  [] Meds as prescribed  [] Modalities as prescribed   [] Call Physician     Frequency/Duration:  Plan  Times per week: 2  Plan weeks: 12  Current Treatment Recommendations: Strengthening, ROM, Neuromuscular Re-education, Home Exercise Program, Manual Therapy - Soft Tissue Mobilization, Modalities, Manual Therapy - Joint Manipulation     Pt to continue current HEP. See objective section for any therapeutic exercise changes, additions or modifications this date.     PT Individual Minutes  Time In: 9155  Time Out: 6249  Minutes: 46  Timed Code Treatment Minutes: 46 Minutes     Timed Activity Minutes Units   Ther Ex 46 3     Signature:  Electronically signed by Sherren Bills, PTA on 12/1/21 at 9:21 AM EST

## 2021-12-03 DIAGNOSIS — M25.50 ARTHRALGIA, UNSPECIFIED JOINT: ICD-10-CM

## 2021-12-03 RX ORDER — CELECOXIB 200 MG/1
CAPSULE ORAL
Qty: 60 CAPSULE | Refills: 2 | Status: SHIPPED | OUTPATIENT
Start: 2021-12-03 | End: 2022-04-21

## 2021-12-06 ENCOUNTER — HOSPITAL ENCOUNTER (OUTPATIENT)
Dept: PHYSICAL THERAPY | Age: 66
Setting detail: THERAPIES SERIES
Discharge: HOME | End: 2021-12-06
Payer: COMMERCIAL

## 2021-12-06 ENCOUNTER — APPOINTMENT (OUTPATIENT)
Dept: PHYSICAL THERAPY | Age: 66
End: 2021-12-06
Payer: COMMERCIAL

## 2021-12-06 PROCEDURE — 97110 THERAPEUTIC EXERCISES: CPT

## 2021-12-06 ASSESSMENT — PAIN SCALES - GENERAL: PAINLEVEL_OUTOF10: 0

## 2021-12-06 NOTE — PROGRESS NOTES
[address] [place] [address]  [address]  GDPZ:[phone]        Date: 2021  Patient: Bettye Saha  : 1955  ACCT #: [de-identified]  Referring Practitioner: Dr Pita Valle  Diagnosis: Impingement syndrome R shoulder, OA, strain muscle, tendon long head bicep R s/p R RTC repair 10/4/21  Treatment Diagnosis: R shoulder pain with strength/rom deficits s/p surgical repair    Visit Information:  PT Visit Information  Onset Date: 10/04/21  PT Insurance Information: BCBS  Total # of Visits Approved: 150 ([facility])  Total # of Visits to Date: 13  No Show: 0  Canceled Appointment: 0  Progress Note Counter: -    Subjective: Shoulder doing good, once in awhile will get a twinge, but that's it.  Went back to work today just doing half days to start per Dr. Tom Alejandre.     HEP Compliance:  [x] Good [] Fair [] Poor [] Reports not doing due to:    Vital Signs  Patient Currently in Pain: No   Pain Screening  Patient Currently in Pain: No  Pain Assessment  Pain Assessment: 0-10  Pain Level: 0    OBJECTIVE:   Exercises  Exercise 1: Full PROM graduallly by  desired  Exercise 2: Phase III begin strengthening to start 21 isometrics, theraband, RC dumbell, flex, abduction, SL ER , prone ext/horizontal abd, ball stab on table  Exercise 3: UBE x 2 mins forward, x 2 mins reverse  Exercise 4: prone: shoulder ext 2# 2 x 10, abduction(palm down) 2 x 10, row 2 x 10 2# wt  Exercise 5: wand flex/ER/scap x 10, 5\" ea  Exercise 6: stand shl flex,  2# 7 x 3\", 1# 3 x 3\" (held fatigue)  Exercise 7: stand shl abd, 1# 6 x 3, 0# 4 x 3\" (held fatigue)  Exercise 8: tband mid row/ext with BTB x 15, 5\" ea  Exercise 9: tband IR x 10; BTB, ER x 10 GTB x 10, 5\" ea  Exercise 10: small ball on wall: circles cw/ccw, up/down, side to side x 10 ea  Exercise 11: tball push ups on wall x 10  Exercise 12: R uni high row on CC 20# x 10  Exercise 13: R shoulder adduction with CC 15# x 10  Exercise 20: HEP: issued shoulder isometrics, cane exercises, seated shoulder ROM(11/03/2021)       Strength: [x] NT  [] MMT completed:     ROM: [] NT  [x] ROM measurements:      PROM RUE (degrees)  RUE General PROM: Shoulder Flex 170, Abd 170, ER 50  AROM RUE (degrees)  RUE General AROM: Shoulder Flex 170, Abd 155, IR  reach L 4, ER base of occiput     *Indicates exercise, modality, or manual techniques to be initiated when appropriate    Assessment: Body structures, Functions, Activity limitations: Decreased functional mobility , Decreased ADL status, Decreased ROM, Decreased strength, Increased pain, Decreased posture  Assessment: Pt demonstrates good progression through post op RTC ther ex. Tolerating T band scap retraction and prone scap strengthening exercises for improving UE strength. Pt demonstrates improvement with prone shoulder extension and row, however weakness noted in mid traps with horz abduction. Pt will progress towards meeting long term goals per POC. Treatment Diagnosis: R shoulder pain with strength/rom deficits s/p surgical repair  Prognosis: Good     Goals:  Short term goals  Time Frame for Short term goals: 4 weeks  Short term goal 1: The pt will demonstrate improved postural awareness requiring <25% VC's with exercises  Short term goal 2: Decrease R shoulder pain 50% to assist with improved functional gains. Short term goal 3: Pain-free R shoulder PROM to WNL allowing an increase in ADL tolerance    Long term goals  Time Frame for Long term goals : 8-10 weeks  Long term goal 1: Indep HEP for symptom management  Long term goal 2: Pt demo improved overall function by reporting greater than 80% per functional survey score  Long term goal 3: Pain-free R shoulder AROM to WNL allowing an increase in ADL tolerance.   Long term goal 4: Improve R shoulder strength 4/5 to 4+/5 to allow patient to reach overhead for daily activities return to work  Progress toward goals: Performing post op R RTC there ex such as T band row, ext, IR, and ER for meeting long term goal 4. POST-PAIN       Pain Rating (0-10 pain scale):   0/10   Location and pain description same as pre-treatment unless indicated. Action: [x] NA   [] Perform HEP  [] Meds as prescribed  [] Modalities as prescribed   [] Call Physician     Frequency/Duration:  Plan  Times per week: 2  Plan weeks: 12  Current Treatment Recommendations: Strengthening, ROM, Neuromuscular Re-education, Home Exercise Program, Manual Therapy - Soft Tissue Mobilization, Modalities, Manual Therapy - Joint Manipulation     Pt to continue current HEP. See objective section for any therapeutic exercise changes, additions or modifications this date.     PT Individual Minutes  Time In: 7046  Time Out: 6793  Minutes: 40  Timed Code Treatment Minutes: 40 Minutes     Timed Activity Minutes Units   Ther Ex 40 3     Signature:  Electronically signed by Thomas Hess PTA on 12/6/21 at 3:39 PM EST

## 2021-12-08 ENCOUNTER — APPOINTMENT (OUTPATIENT)
Dept: PHYSICAL THERAPY | Age: 66
End: 2021-12-08
Payer: COMMERCIAL

## 2021-12-08 ENCOUNTER — HOSPITAL ENCOUNTER (OUTPATIENT)
Dept: PHYSICAL THERAPY | Age: 66
Setting detail: THERAPIES SERIES
Discharge: HOME | End: 2021-12-08
Payer: COMMERCIAL

## 2021-12-08 PROCEDURE — 97110 THERAPEUTIC EXERCISES: CPT

## 2021-12-08 NOTE — PROGRESS NOTES
[address]  [address]:[phone]        Date: 2021  Patient: Ted Recio  : 1955  ACCT #: [de-identified]  Referring Practitioner: Dr Trina Marlow  Diagnosis: Impingement syndrome R shoulder, OA, strain muscle, tendon long head bicep R s/p R RTC repair 10/4/21  Treatment Diagnosis: R shoulder pain with strength/rom deficits s/p surgical repair    Visit Information:  PT Visit Information  Onset Date: 10/04/21  PT Insurance Information: BCBS  Total # of Visits Approved: 150 ([facility] [place])  Total # of Visits to Date: 14  No Show: 0  Canceled Appointment: 0  Progress Note Counter:     Subjective: Shoulder doing good, once in awhile will get a twinge, but that's it.  Went back to work today just doing half days to start per Dr. Candelaria Rodriguez.     HEP Compliance:  [x] Good [] Fair [] Poor [] Reports not doing due to:    Vital Signs  Patient Currently in Pain: No   Pain Screening  Patient Currently in Pain: No    OBJECTIVE:   Exercises  Exercise 1: Full PROM graduallly by  desired  Exercise 2: Phase III begin strengthening to start 21 isometrics, theraband, RC dumbell, flex, abduction, SL ER , prone ext/horizontal abd, ball stab on table  Exercise 3: UBE x 2 mins forward, x 2 mins reverse  Exercise 4: prone: shoulder ext 2# 2 x 10, abduction(palm down) 2 x 10, row 2 x 10 2# wt  Exercise 5: wand flex/ER/scap x 10, 5\" ea  Exercise 6: stand shl flex, (held fatigue)  Exercise 7: stand shl abd, (held fatigue)  Exercise 8: tband mid row/ext with BTB x 15, 5\" ea  Exercise 9: tband IR x 15; BTB, ER x 8 BTB x 10, 5\" ea  Exercise 10: small ball on wall: circles cw/ccw, up/down, side to side x 10 ea' 1# wt  Exercise 11: tball push ups on wall x 10  Exercise 12: R uni high row on CC 20# x 15  Exercise 13: R shoulder adduction with CC 20# x 15  Exercise 20: HEP: issued shoulder isometrics, cane exercises, seated shoulder ROM(11/03/2021)    Strength: [x] NT  [] MMT completed:     ROM: [] NT  [x] ROM measurements:      PROM RUE (degrees)  RUE General PROM: Shoulder Flex WNL, Abd 170, ER 50  AROM RUE (degrees)  RUE General AROM: Shoulder Flex WNL, Abd 165, IR  reach L 4, ER base of occiput      *Indicates exercise, modality, or manual techniques to be initiated when appropriate    Assessment: Body structures, Functions, Activity limitations: Decreased functional mobility , Decreased ADL status, Decreased ROM, Decreased strength, Increased pain, Decreased posture  Assessment: Progressed to Blue t band for IR, ER. Fatigue noted with ER only completing 8 reps. Able to increase reps with cable column machine. demonstrates good progression through exercises. Still limitations with R UE abduction, scapular strength. Achieved WNL for R shoulder flexion AROM. Treatment Diagnosis: R shoulder pain with strength/rom deficits s/p surgical repair  Prognosis: Good       Goals:  Short term goals  Time Frame for Short term goals: 4 weeks  Short term goal 1: The pt will demonstrate improved postural awareness requiring <25% VC's with exercises  Short term goal 2: Decrease R shoulder pain 50% to assist with improved functional gains. Short term goal 3: Pain-free R shoulder PROM to WNL allowing an increase in ADL tolerance    Long term goals  Time Frame for Long term goals : 8-10 weeks  Long term goal 1: Indep HEP for symptom management  Long term goal 2: Pt demo improved overall function by reporting greater than 80% per functional survey score  Long term goal 3: Pain-free R shoulder AROM to WNL allowing an increase in ADL tolerance. Long term goal 4: Improve R shoulder strength 4/5 to 4+/5 to allow patient to reach overhead for daily activities return to work  Progress toward goals: Pt progressing with reps and wt for improving R UE, scapular strength.      POST-PAIN       Pain Rating (0-10 pain scale):   0/10   Location and pain description same as pre-treatment unless indicated. Action: [x] NA   [] Perform HEP  [] Meds as prescribed  [] Modalities as prescribed   [] Call Physician     Frequency/Duration:  Plan  Times per week: 2  Plan weeks: 12  Current Treatment Recommendations: Strengthening, ROM, Neuromuscular Re-education, Home Exercise Program, Manual Therapy - Soft Tissue Mobilization, Modalities, Manual Therapy - Joint Manipulation     Pt to continue current HEP. See objective section for any therapeutic exercise changes, additions or modifications this date.     PT Individual Minutes  Time In: 0624  Time Out: 0853  Minutes: 40  Timed Code Treatment Minutes: 40 Minutes     Timed Activity Minutes Units   Ther Ex 40 3     Signature:  Electronically signed by Sindhu Gonzales PTA on 12/8/21 at 2:58 PM EST

## 2021-12-13 ENCOUNTER — APPOINTMENT (OUTPATIENT)
Dept: PHYSICAL THERAPY | Age: 66
End: 2021-12-13
Payer: COMMERCIAL

## 2021-12-13 ENCOUNTER — HOSPITAL ENCOUNTER (OUTPATIENT)
Dept: PHYSICAL THERAPY | Age: 66
Setting detail: THERAPIES SERIES
Discharge: HOME | End: 2021-12-13
Payer: COMMERCIAL

## 2021-12-13 PROCEDURE — 97110 THERAPEUTIC EXERCISES: CPT

## 2021-12-13 ASSESSMENT — PAIN SCALES - GENERAL: PAINLEVEL_OUTOF10: 0

## 2021-12-13 NOTE — PROGRESS NOTES
[address] [address]  [address]  XRTJY:[phone]        Date: 2021  Patient: Camren Villarreal  : 1955  ACCT #: [de-identified]  Referring Practitioner: Dr Eliud Ralph  Diagnosis: Impingement syndrome R shoulder, OA, strain muscle, tendon long head bicep R s/p R RTC repair 10/4/21  Treatment Diagnosis: R shoulder pain with strength/rom deficits s/p surgical repair    Visit Information:  PT Visit Information  Onset Date: 10/04/21  PT Insurance Information: BCBS  Total # of Visits Approved: 150 ([facility] [place] [facility])  Total # of Visits to Date: 15  No Show: 0  Canceled Appointment: 0  Progress Note Counter: 15/18-    Subjective: Pt notes no issues with R shoulder. Doing exercises and using it at home. c/c is weakness in arm.      HEP Compliance:  [x] Good [] Fair [] Poor [] Reports not doing due to:    Vital Signs  Patient Currently in Pain: No   Pain Screening  Patient Currently in Pain: No  Pain Assessment  Pain Assessment: 0-10  Pain Level: 0    OBJECTIVE:   Exercises  Exercise 1: Full PROM graduallly by  desired  Exercise 2: Phase III begin strengthening to start 21 isometrics, theraband, RC dumbell, flex, abduction, SL ER , prone ext/horizontal abd, ball stab on table  Exercise 3: UBE x 2 mins forward, x 2 mins reverse  Exercise 4: prone: shoulder ext 2# 2 x 10, abduction(palm down) 2 x 10, row 2 x 10 2# wt  Exercise 5: wand flex/ER/scap x 10, 5\" ea  Exercise 6: stand shl flex, (held fatigue)  Exercise 7: stand shl abd, (held fatigue)  Exercise 8: tband mid row/ext with BTB x 15, 5\" ea  Exercise 9: tband IR x 15; BTB, ER x 8 BTB x 10, 5\" ea  Exercise 10: small ball on wall: circles cw/ccw, up/down, side to side x 10 ea' 1# wt  Exercise 11: BOSU pushups x 6, (difficulty, fatigue)  Exercise 12: R uni high row on CC 20# x 15  Exercise 13: R shoulder adduction with CC 20# x 15  Exercise 14: R shoulder ext with CC 15# x 15  Exercise 20: HEP: issued shoulder isometrics, cane exercises, seated shoulder ROM(11/03/2021)       Strength: [x] NT  [] MMT completed:     ROM: [x] NT  [] ROM measurements:     *Indicates exercise, modality, or manual techniques to be initiated when appropriate    Assessment: Body structures, Functions, Activity limitations: Decreased functional mobility , Decreased ADL status, Decreased ROM, Decreased strength, Increased pain, Decreased posture  Assessment: Pt performing increased intensity with post op RTC ther ex[employer] BOSU pushups for improving closed chain UE strength. Decreased reps d/t fatigue. Continued with T band, cable column scapular stability exercises. Improving with R shoulder AROM in all planes. Good candidate towards meeting long term goals. Treatment Diagnosis: R shoulder pain with strength/rom deficits s/p surgical repair  Prognosis: Good       Goals:  Short term goals  Time Frame for Short term goals: 4 weeks  Short term goal 1: The pt will demonstrate improved postural awareness requiring <25% VC's with exercises  Short term goal 2: Decrease R shoulder pain 50% to assist with improved functional gains. Short term goal 3: Pain-free R shoulder PROM to WNL allowing an increase in ADL tolerance    Long term goals  Time Frame for Long term goals : 8-10 weeks  Long term goal 1: Indep HEP for symptom management  Long term goal 2: Pt demo improved overall function by reporting greater than 80% per functional survey score  Long term goal 3: Pain-free R shoulder AROM to WNL allowing an increase in ADL tolerance. Long term goal 4: Improve R shoulder strength 4/5 to 4+/5 to allow patient to reach overhead for daily activities return to work  Progress toward goals: Pt progressing with post op R RTC there ex towards long term goals per POC. POST-PAIN       Pain Rating (0-10 pain scale):   0/10   Location and pain description same as pre-treatment unless indicated.    Action: [x] NA   [] Perform HEP  [] Meds as prescribed  [] Modalities as prescribed   [] Call Physician     Frequency/Duration:  Plan  Times per week: 2  Plan weeks: 12  Current Treatment Recommendations: Strengthening, ROM, Neuromuscular Re-education, Home Exercise Program, Manual Therapy - Soft Tissue Mobilization, Modalities, Manual Therapy - Joint Manipulation     Pt to continue current HEP. See objective section for any therapeutic exercise changes, additions or modifications this date.     PT Individual Minutes  Time In: 0397  Time Out: 4684  Minutes: 44  Timed Code Treatment Minutes: 42 Minutes     Timed Activity Minutes Units   Ther Ex 42 3     Signature:  Electronically signed by Quirino Madrigal PTA on 12/13/21 at 9:20 AM EST

## 2021-12-15 ENCOUNTER — APPOINTMENT (OUTPATIENT)
Dept: PHYSICAL THERAPY | Age: 66
End: 2021-12-15
Payer: COMMERCIAL

## 2021-12-20 ENCOUNTER — HOSPITAL ENCOUNTER (OUTPATIENT)
Dept: PHYSICAL THERAPY | Age: 66
Setting detail: THERAPIES SERIES
Discharge: HOME | End: 2021-12-20
Payer: COMMERCIAL

## 2021-12-20 PROCEDURE — 97110 THERAPEUTIC EXERCISES: CPT

## 2021-12-20 ASSESSMENT — PAIN DESCRIPTION - LOCATION: LOCATION: SHOULDER

## 2021-12-20 ASSESSMENT — PAIN SCALES - GENERAL: PAINLEVEL_OUTOF10: 0

## 2021-12-20 ASSESSMENT — PAIN DESCRIPTION - ORIENTATION: ORIENTATION: RIGHT

## 2021-12-20 NOTE — PROGRESS NOTES
[address]  [address]  ZENRK:[phone]        Date: 2021  Patient: Manohar Ramos  : 1955  ACCT #: [de-identified]  Referring Practitioner: Dr Moe Hinkle  Diagnosis: Impingement syndrome R shoulder, OA, strain muscle, tendon long head bicep R s/p R RTC repair 10/4/21  Treatment Diagnosis: R shoulder pain with strength/rom deficits s/p surgical repair    Visit Information:  PT Visit Information  Onset Date: 10/04/21  PT Insurance Information: BCBS  Total # of Visits Approved: 150 ([facility])  Total # of Visits to Date: 16  No Show: 0  Canceled Appointment: 0  Progress Note Counter: -    Subjective: Pt notes no pain. C/c weakness with over head and out to side movements.      HEP Compliance:  [x] Good [] Fair [] Poor [] Reports not doing due to:    Vital Signs  Patient Currently in Pain: No   Pain Screening  Patient Currently in Pain: No  Pain Assessment  Pain Assessment: 0-10  Pain Level: 0  Pain Location: Shoulder  Pain Orientation: Right    OBJECTIVE:   Exercises  Exercise 1: Full PROM graduallly by  desired  Exercise 2: Phase III begin strengthening to start 21 isometrics, theraband, RC dumbell, flex, abduction, SL ER , prone ext/horizontal abd, ball stab on table  Exercise 3: UBE x 2 mins forward, x 2 mins reverse  Exercise 4: prone: shoulder ext 2# 2 x 10, abduction(palm down) 2 x 10, row 2 x 10 2# wt  Exercise 5: wand flex/ER/scap x 10, 5\" ea  Exercise 6: stand shl wand flex x 10,  Exercise 8: tband mid row/ext with BTB x 15, 5\" ea  Exercise 9: tband IR x 15; BTB, ER x 8 BTB x 10, 5\" ea  Exercise 10: small ball on wall: circles cw/ccw, up/down, side to side x 10 ea' 1# wt  Exercise 11: BOSU pushups x 6, (difficulty, fatigue)  Exercise 12: R uni high row on CC 20# x 15  Exercise 13: R shoulder adduction with CC 20# x 15  Exercise 14: R shoulder ext with CC 15# x 15  Exercise 16: seated: shoulder abduction x 10, using table as starting point. Exercise 20: HEP: issued shoulder isometrics, cane exercises, seated shoulder ROM(11/03/2021)    Strength: [x] NT  [] MMT completed:     ROM: [x] NT  [] ROM measurements:     *Indicates exercise, modality, or manual techniques to be initiated when appropriate    Assessment: Body structures, Functions, Activity limitations: Decreased functional mobility ,Decreased ADL status,Decreased ROM,Decreased strength,Increased pain,Decreased posture  Assessment: Pt tolerating added progression of R UE strengthening exercises. Performing cable column machine exercises, T band strengthening exercises, and prone scap exercises. Continues to have limitations with R shoulder flexion and abduction strength. Fatigue reported after completing exercises. Treatment Diagnosis: R shoulder pain with strength/rom deficits s/p surgical repair  Prognosis: Good     Goals:  Short term goals  Time Frame for Short term goals: 4 weeks  Short term goal 1: The pt will demonstrate improved postural awareness requiring <25% VC's with exercises  Short term goal 2: Decrease R shoulder pain 50% to assist with improved functional gains. Short term goal 3: Pain-free R shoulder PROM to WNL allowing an increase in ADL tolerance    Long term goals  Time Frame for Long term goals : 8-10 weeks  Long term goal 1: Indep HEP for symptom management  Long term goal 2: Pt demo improved overall function by reporting greater than 80% per functional survey score  Long term goal 3: Pain-free R shoulder AROM to WNL allowing an increase in ADL tolerance. Long term goal 4: Improve R shoulder strength 4/5 to 4+/5 to allow patient to reach overhead for daily activities return to work  Progress toward goals: Pt goals are ongoing, progressing through post op shoulder protocol. POST-PAIN       Pain Rating (0-10 pain scale):   0/10   Location and pain description same as pre-treatment unless indicated.    Action: [x] NA   [] Perform HEP [] Meds as prescribed  [] Modalities as prescribed   [] Call Physician     Frequency/Duration:  Plan  Times per week: 2  Plan weeks: 12  Current Treatment Recommendations: Strengthening,ROM,Neuromuscular Re-education,Home Exercise Program,Manual Therapy - Soft Tissue Mobilization,Modalities,Manual Therapy - Joint Manipulation     Pt to continue current HEP. See objective section for any therapeutic exercise changes, additions or modifications this date.   PT Individual Minutes  Time In: 2955  Time Out: 4396  Minutes: 41  Timed Code Treatment Minutes: 39 Minutes     Timed Activity Minutes Units   Ther Ex 39 3     Signature:  Electronically signed by Baltazar Hauser PTA on 12/20/21 at 8:36 AM EST

## 2021-12-22 ENCOUNTER — HOSPITAL ENCOUNTER (OUTPATIENT)
Dept: PHYSICAL THERAPY | Age: 66
Setting detail: THERAPIES SERIES
Discharge: HOME | End: 2021-12-22
Payer: COMMERCIAL

## 2021-12-22 PROCEDURE — 97110 THERAPEUTIC EXERCISES: CPT

## 2021-12-22 ASSESSMENT — PAIN SCALES - GENERAL: PAINLEVEL_OUTOF10: 0

## 2021-12-22 NOTE — PROGRESS NOTES
[address]  [address]  XBEPP:[phone]        Date: 2021  Patient: Ambrosio Johnson  : 1955  ACCT #: [de-identified]  Referring Practitioner: Dr Bruce Weber  Diagnosis: Impingement syndrome R shoulder, OA, strain muscle, tendon long head bicep R s/p R RTC repair 10/4/21  Treatment Diagnosis: R shoulder pain with strength/rom deficits s/p surgical repair    Visit Information:  PT Visit Information  Onset Date: 10/04/21  PT Insurance Information: BCBS  Total # of Visits Approved: 150 ([facility] [place])  Total # of Visits to Date:   No Show: 0  Canceled Appointment: 0  Progress Note Counter:     Subjective: Pt states he is doing fine this morning. No c/o pain just weakness. HEP Compliance:  [x] Good [] Fair [] Poor [] Reports not doing due to:    Vital Signs  Patient Currently in Pain: No   Pain Screening  Patient Currently in Pain: No  Pain Assessment  Pain Assessment: 0-10  Pain Level: 0    OBJECTIVE:   Exercises  Exercise 1: Full PROM graduallly by  desired  Exercise 2: Phase III begin strengthening to start 21 isometrics, theraband, RC dumbell, flex, abduction, SL ER , prone ext/horizontal abd, ball stab on table  Exercise 3: UBE x 2 mins forward, x 2 mins reverse  Exercise 4: prone: shoulder ext 2# 2 x 10, abduction(palm down) 2 x 10, row 2 x 10 2# wt  Exercise 5: supine wand flex/ER/scap x 10, 5\" ea  Exercise 6: stand shl wand flex x 10,  Exercise 7: side lying shoulder abduction x 10, 1 # wt  Exercise 8: tband mid row/ext with BTB x 15, 5\" ea  Exercise 9: tband IR x 15; BTB, ER x 8 BTB x 10, 5\" ea  Exercise 10: small ball on wall: circles cw/ccw, up/down, side to side x 10 ea' 1# wt  Exercise 12: R uni high row on CC 20# x 15  Exercise 13: R shoulder adduction with CC 20# x 15  Exercise 14: R shoulder ext with CC 15# x 15  Exercise 16: seated: shoulder abduction x 10, using table as starting point.   Exercise 20: HEP: issued shoulder isometrics, cane exercises, seated shoulder ROM(11/03/2021)    Strength: [] NT  [x] MMT completed:        Strength RUE  Comment: Shoulder flex/abd 3+/5, IR 5/5, ER 4/5, elbow 5/5     ROM: [] NT  [x] ROM measurements:      PROM RUE (degrees)  RUE General PROM: Shoulder Flex WNL, Abd 170, ER 50  AROM RUE (degrees)  RUE General AROM: Shoulder Flex WNL, Abd 165, IR  reach L 4, ER base of occiput     *Indicates exercise, modality, or manual techniques to be initiated when appropriate    Assessment: Body structures, Functions, Activity limitations: Decreased functional mobility ,Decreased ADL status,Decreased ROM,Decreased strength,Increased pain,Decreased posture  Assessment: Pt has improved with R shoulder strength in each plane, most notably ER and IR. Though there is increased strength, limitations still exist with shoulder flexion and abduction strength. Pt progressing with T band, prone, cable column exercises for improving UE strength. Making gains towards goals per POC. Treatment Diagnosis: R shoulder pain with strength/rom deficits s/p surgical repair  Prognosis: Good     Goals:  Short term goals  Time Frame for Short term goals: 4 weeks  Short term goal 1: The pt will demonstrate improved postural awareness requiring <25% VC's with exercises  Short term goal 2: Decrease R shoulder pain 50% to assist with improved functional gains. Short term goal 3: Pain-free R shoulder PROM to WNL allowing an increase in ADL tolerance    Long term goals  Time Frame for Long term goals : 8-10 weeks  Long term goal 1: Indep HEP for symptom management  Long term goal 2: Pt demo improved overall function by reporting greater than 80% per functional survey score  Long term goal 3: Pain-free R shoulder AROM to WNL allowing an increase in ADL tolerance.   Long term goal 4: Improve R shoulder strength 4/5 to 4+/5 to allow patient to reach overhead for daily activities return to work  Progress toward goals: Pt

## 2021-12-27 ENCOUNTER — HOSPITAL ENCOUNTER (OUTPATIENT)
Dept: PHYSICAL THERAPY | Age: 66
Setting detail: THERAPIES SERIES
Discharge: HOME | End: 2021-12-27
Payer: COMMERCIAL

## 2021-12-27 PROCEDURE — 97110 THERAPEUTIC EXERCISES: CPT

## 2021-12-27 ASSESSMENT — PAIN SCALES - GENERAL: PAINLEVEL_OUTOF10: 0

## 2021-12-27 NOTE — PROGRESS NOTES
[address]  [address]  BEZQP:[phone]        Date: 2021  Patient: Charmaine Dangelo  : 1955  ACCT #: [de-identified]  Referring Practitioner: Dr Kamilah Martinez  Diagnosis: Impingement syndrome R shoulder, OA, strain muscle, tendon long head bicep R s/p R RTC repair 10/4/21  Treatment Diagnosis: R shoulder pain with strength/rom deficits s/p surgical repair    Visit Information:  PT Visit Information  Onset Date: 10/04/21  PT Insurance Information: BCBS  Total # of Visits Approved: 150 ([facility] [place])  Total # of Visits to Date: 18  No Show: 0  Canceled Appointment: 0  Progress Note Counter: -    Subjective: No c/o pain just weakness in arm. HEP Compliance:  [x] Good [] Fair [] Poor [] Reports not doing due to:    Vital Signs  Patient Currently in Pain: No   Pain Screening  Patient Currently in Pain: No  Pain Assessment  Pain Assessment: 0-10  Pain Level: 0    OBJECTIVE:   Exercises  Exercise 1: Full PROM graduallly by  desired  Exercise 2: Phase III begin strengthening to start 21 isometrics, theraband, RC dumbell, flex, abduction, SL ER , prone ext/horizontal abd, ball stab on table  Exercise 3: UBE x 2 mins forward, x 2 mins reverse  Exercise 4: prone: shoulder ext 2# 2 x 10, abduction(palm down) 2 x 10, row 2 x 10 2# wt  Exercise 5: supine wand flex/ER/scap x 10, 5\" ea  Exercise 6: stand shl wand flex x 10,  Exercise 7: side lying shoulder abduction x 10, 1 # wt  Exercise 10: small ball on wall: circles cw/ccw, up/down, side to side x 10 ea' 1# wt  Exercise 12: R uni high row on CC 20# x 15  Exercise 13: R shoulder adduction with CC 20# x 15  Exercise 14: R shoulder ext with CC 15# x 15  Exercise 16: seated: shoulder abduction x 10, using table as starting point.   Exercise 20: HEP: issued shoulder isometrics, cane exercises, seated shoulder ROM(2021)       *Indicates exercise, modality, or manual techniques to be initiated when appropriate    Assessment: Body structures, Functions, Activity limitations: Decreased functional mobility ,Decreased ADL status,Decreased ROM,Decreased strength,Increased pain,Decreased posture  Assessment: pt making progress towards long term goals per POC. Demonstrates good progression through R UE strengthening exercises performing prone scapular exercises, cable column machine, and closed chain exercises. Treatment Diagnosis: R shoulder pain with strength/rom deficits s/p surgical repair  Prognosis: Good       Goals:  Short term goals  Time Frame for Short term goals: 4 weeks  Short term goal 1: The pt will demonstrate improved postural awareness requiring <25% VC's with exercises  Short term goal 2: Decrease R shoulder pain 50% to assist with improved functional gains. Short term goal 3: Pain-free R shoulder PROM to WNL allowing an increase in ADL tolerance    Long term goals  Time Frame for Long term goals : 8-10 weeks  Long term goal 1: Indep HEP for symptom management  Long term goal 2: Pt demo improved overall function by reporting greater than 80% per functional survey score  Long term goal 3: Pain-free R shoulder AROM to WNL allowing an increase in ADL tolerance. Long term goal 4: Improve R shoulder strength 4/5 to 4+/5 to allow patient to reach overhead for daily activities return to work  Progress toward goals: Pt improving with R shoulder, scapular strength. POST-PAIN       Pain Rating (0-10 pain scale):   0/10   Location and pain description same as pre-treatment unless indicated. Action: [x] NA   [] Perform HEP  [] Meds as prescribed  [] Modalities as prescribed   [] Call Physician     Frequency/Duration:  Plan  Times per week: 2  Plan weeks: 12  Current Treatment Recommendations: Strengthening,ROM,Neuromuscular Re-education,Home Exercise Program,Manual Therapy - Soft Tissue Mobilization,Modalities,Manual Therapy - Joint Manipulation     Pt to continue current HEP. See objective section for any therapeutic exercise changes, additions or modifications this date.     PT Individual Minutes  Time In: 6418  Time Out: 0840  Minutes: 45  Timed Code Treatment Minutes: 45 Minutes     Timed Activity Minutes Units   Ther Ex 45 3     Signature:  Electronically signed by Thomas Hess PTA on 12/27/21 at 8:00 AM EST

## 2021-12-29 ENCOUNTER — HOSPITAL ENCOUNTER (OUTPATIENT)
Dept: PHYSICAL THERAPY | Age: 66
Setting detail: THERAPIES SERIES
Discharge: HOME | End: 2021-12-29
Payer: COMMERCIAL

## 2021-12-29 ENCOUNTER — APPOINTMENT (OUTPATIENT)
Dept: PHYSICAL THERAPY | Age: 66
End: 2021-12-29
Payer: COMMERCIAL

## 2021-12-29 NOTE — PROGRESS NOTES
[address]  [address]  RYXAO:[phone]     []? Certification  []? Recertification      []? Plan of Care  []? Progress Note [x]? Discharge                            To:  Dr Nava Vasquez       From:  Danya Styles, PT  Patient: Geovani Bravo     : 1955  Diagnosis: Impingement syndrome R shoulder, OA, strain muscle, tendon long head bicep R s/p R RTC repair 10/4/21     Date: 2021  Treatment Diagnosis: R shoulder pain with strength/rom deficits s/p surgical repair     Progress Report Period from:  10/25/2021  to 2021     Total # of Visits to Date: 18   No Show: 0    Canceled Appointment: 1     OBJECTIVE:   Short Term Goals - Time Frame for Short term goals: 4 weeks    Goals Current/Discharge status  Met   Short term goal 1: The pt will demonstrate improved postural awareness requiring <25% VC's with exercises  Pt requires no VC's for postural awareness. [x]? yes  []? no   Short term goal 2: Decrease R shoulder pain 50% to assist with improved functional gains. Pain Level: 0      [x]? yes  []? no   Short term goal 3: Pain-free R shoulder PROM to WNL allowing an increase in ADL tolerance   PROM RUE (degrees)  RUE General PROM: Shoulder Flex WNL, Abd WNL, ER WNL    [x]? yes  []? no      Long Term Goals - Time Frame for Long term goals : 8-10 weeks  Goals Current/ Discharge status Met   Long term goal 1: Indep HEP for symptom management Pt independent with issued HEP. [x]? yes  []? no   Long term goal 2: Pt demo improved overall function by reporting greater than 80% per functional survey score Exam: UEFI 70/80=88% functional [x]? yes  []? no   Long term goal 3: Pain-free R shoulder AROM to WNL allowing an increase in ADL tolerance.    AROM RUE (degrees)  RUE General AROM: Shoulder Flex WNL, Abd WNL, IR  reach R L4 , ER base of occiput   [x]?  yes  []? no   Long term goal 4: Improve R shoulder strength 4/5 to 4+/5 to allow patient to reach overhead for daily activities return to work  Strength LYNETTE  Comment: Shoulder flex/abd 3+/5, IR 5/5, ER 4/5, elbow 5/5   []? yes  [x]? no      Body structures, Functions, Activity limitations: Decreased functional mobility , Decreased ADL status, Decreased ROM, Decreased strength, Increased pain, Decreased posture  Assessment: Pt has improved with R shoulder AROM in all planes meeting long term goals. Demonstrates increased UEFI score advancing from 59 percent to 88 percent. Has returned to work without any impairments. Pt has met majority of goals per PT POC except strengthening in which pt has HEP to continue to progress in this area. Prognosis: Good  Discharge Recommendations: Continue to assess pending progress     PLAN: [x]? D/C                                         Patient Status:[]? Continue plan of Care                          [x]? Discharge PT. Recommend pt continue with HEP.                            []? Additional visits requested, Please re-certify for additional visits:                                                       Signature: Electronically signed and objective measures taken by Ab Cerna PTA on 12/29/2021 at 2:30 PM  Electronically signed by Devora Campos PT on 1/4/2022 at 5:23 PM

## 2021-12-29 NOTE — PROGRESS NOTES
Therapy                            Cancellation/No-show Note      Date:  2021  Patient Name:  Chavez Mckeon  :  1955   MRN:  [id number]  Referring Practitioner: Dr Jett Larson  Diagnosis: Impingement syndrome R shoulder, OA, strain muscle, tendon long head bicep R s/p R RTC repair 10/4/21    Visit Information:  PT Visit Information  Onset Date: 10/04/21  PT Insurance Information: BCBS  Total # of Visits Approved: 150 ([facility])  Total # of Visits to Date: 18  No Show: 0  Canceled Appointment: 1  Progress Note Counter: - (cancellation on 2021)    For today's appointment patient:  [x]  Cancelled  []  Rescheduled appointment  []  No-show   []  Called pt to remind of next appointment     Reason given by patient:  []  Patient ill  []  Conflicting appointment  []  No transportation    []  Conflict with work  [x]  No reason given  []  Other:      [] Pt has future appointments scheduled, no follow up needed  [] Pt requests to be on hold. Reason:   If > 2 weeks please discuss with therapist.  [] Therapist to call pt for follow up     Comments:  Today was last PT appointment.  D/C from PT    Signature: Electronically signed by Alonzo Ortega PTA on 21 at 1:26 PM EST

## 2022-03-14 DIAGNOSIS — E78.2 MIXED HYPERLIPIDEMIA: ICD-10-CM

## 2022-03-14 RX ORDER — ATORVASTATIN CALCIUM 20 MG/1
TABLET, FILM COATED ORAL
Qty: 30 TABLET | Refills: 5 | Status: SHIPPED | OUTPATIENT
Start: 2022-03-14 | End: 2022-04-21 | Stop reason: SDUPTHER

## 2022-03-14 RX ORDER — METOPROLOL SUCCINATE 100 MG/1
TABLET, EXTENDED RELEASE ORAL
Qty: 30 TABLET | Refills: 5 | Status: SHIPPED | OUTPATIENT
Start: 2022-03-14 | End: 2022-04-21 | Stop reason: SDUPTHER

## 2022-04-21 ENCOUNTER — OFFICE VISIT (OUTPATIENT)
Dept: FAMILY MEDICINE CLINIC | Age: 67
End: 2022-04-21
Payer: MEDICARE

## 2022-04-21 VITALS
HEART RATE: 74 BPM | BODY MASS INDEX: 32.43 KG/M2 | TEMPERATURE: 96.7 F | SYSTOLIC BLOOD PRESSURE: 138 MMHG | OXYGEN SATURATION: 98 % | HEIGHT: 68 IN | WEIGHT: 214 LBS | RESPIRATION RATE: 15 BRPM | DIASTOLIC BLOOD PRESSURE: 70 MMHG

## 2022-04-21 DIAGNOSIS — R73.9 HYPERGLYCEMIA: ICD-10-CM

## 2022-04-21 DIAGNOSIS — E78.2 MIXED HYPERLIPIDEMIA: ICD-10-CM

## 2022-04-21 DIAGNOSIS — R76.8 ANTI-TPO ANTIBODIES PRESENT: ICD-10-CM

## 2022-04-21 DIAGNOSIS — Z23 NEED FOR SHINGLES VACCINE: ICD-10-CM

## 2022-04-21 DIAGNOSIS — Z23 NEED FOR PNEUMOCOCCAL VACCINATION: ICD-10-CM

## 2022-04-21 DIAGNOSIS — R55 PRE-SYNCOPE: Primary | ICD-10-CM

## 2022-04-21 DIAGNOSIS — Z13.6 SCREENING FOR AAA (ABDOMINAL AORTIC ANEURYSM): ICD-10-CM

## 2022-04-21 DIAGNOSIS — R76.8 HEPATITIS C ANTIBODY TEST POSITIVE: Chronic | ICD-10-CM

## 2022-04-21 DIAGNOSIS — K21.9 GASTROESOPHAGEAL REFLUX DISEASE, UNSPECIFIED WHETHER ESOPHAGITIS PRESENT: ICD-10-CM

## 2022-04-21 DIAGNOSIS — R42 DIZZINESS: ICD-10-CM

## 2022-04-21 DIAGNOSIS — I10 ESSENTIAL HYPERTENSION: ICD-10-CM

## 2022-04-21 DIAGNOSIS — C61 PROSTATE CANCER (HCC): ICD-10-CM

## 2022-04-21 LAB
BASOPHILS ABSOLUTE: 0 K/UL (ref 0–0.2)
BASOPHILS RELATIVE PERCENT: 0.3 %
CHOLESTEROL, FASTING: 145 MG/DL (ref 0–199)
EOSINOPHILS ABSOLUTE: 0.1 K/UL (ref 0–0.7)
EOSINOPHILS RELATIVE PERCENT: 2.1 %
HBA1C MFR BLD: 6 % (ref 4.8–5.9)
HCT VFR BLD CALC: 47 % (ref 42–52)
HDLC SERPL-MCNC: 43 MG/DL (ref 40–59)
HEMOGLOBIN: 15.1 G/DL (ref 14–18)
LDL CHOLESTEROL CALCULATED: 86 MG/DL (ref 0–129)
LYMPHOCYTES ABSOLUTE: 1.4 K/UL (ref 1–4.8)
LYMPHOCYTES RELATIVE PERCENT: 22.4 %
MAGNESIUM: 2.2 MG/DL (ref 1.7–2.4)
MCH RBC QN AUTO: 28.5 PG (ref 27–31.3)
MCHC RBC AUTO-ENTMCNC: 32.2 % (ref 33–37)
MCV RBC AUTO: 88.4 FL (ref 80–100)
MONOCYTES ABSOLUTE: 0.5 K/UL (ref 0.2–0.8)
MONOCYTES RELATIVE PERCENT: 8.4 %
NEUTROPHILS ABSOLUTE: 4.3 K/UL (ref 1.4–6.5)
NEUTROPHILS RELATIVE PERCENT: 66.8 %
PDW BLD-RTO: 15.6 % (ref 11.5–14.5)
PLATELET # BLD: 248 K/UL (ref 130–400)
PROSTATE SPECIFIC ANTIGEN: 0.01 NG/ML (ref 0–4)
RBC # BLD: 5.32 M/UL (ref 4.7–6.1)
TRIGLYCERIDE, FASTING: 80 MG/DL (ref 0–150)
TSH SERPL DL<=0.05 MIU/L-ACNC: 1.93 UIU/ML (ref 0.44–3.86)
WBC # BLD: 6.4 K/UL (ref 4.8–10.8)

## 2022-04-21 PROCEDURE — 90732 PPSV23 VACC 2 YRS+ SUBQ/IM: CPT | Performed by: FAMILY MEDICINE

## 2022-04-21 PROCEDURE — 93000 ELECTROCARDIOGRAM COMPLETE: CPT | Performed by: FAMILY MEDICINE

## 2022-04-21 PROCEDURE — 99215 OFFICE O/P EST HI 40 MIN: CPT | Performed by: FAMILY MEDICINE

## 2022-04-21 PROCEDURE — G0009 ADMIN PNEUMOCOCCAL VACCINE: HCPCS | Performed by: FAMILY MEDICINE

## 2022-04-21 RX ORDER — ATORVASTATIN CALCIUM 20 MG/1
TABLET, FILM COATED ORAL
Qty: 30 TABLET | Refills: 5 | Status: SHIPPED | OUTPATIENT
Start: 2022-04-21

## 2022-04-21 RX ORDER — OMEPRAZOLE 20 MG/1
CAPSULE, DELAYED RELEASE ORAL
Qty: 130 CAPSULE | Refills: 1 | Status: SHIPPED | OUTPATIENT
Start: 2022-04-21 | End: 2022-10-17

## 2022-04-21 RX ORDER — METOPROLOL SUCCINATE 100 MG/1
TABLET, EXTENDED RELEASE ORAL
Qty: 90 TABLET | Refills: 4 | Status: SHIPPED | OUTPATIENT
Start: 2022-04-21

## 2022-04-21 ASSESSMENT — PATIENT HEALTH QUESTIONNAIRE - PHQ9
SUM OF ALL RESPONSES TO PHQ QUESTIONS 1-9: 0
6. FEELING BAD ABOUT YOURSELF - OR THAT YOU ARE A FAILURE OR HAVE LET YOURSELF OR YOUR FAMILY DOWN: 0
SUM OF ALL RESPONSES TO PHQ9 QUESTIONS 1 & 2: 0
5. POOR APPETITE OR OVEREATING: 0
2. FEELING DOWN, DEPRESSED OR HOPELESS: 0
7. TROUBLE CONCENTRATING ON THINGS, SUCH AS READING THE NEWSPAPER OR WATCHING TELEVISION: 0
8. MOVING OR SPEAKING SO SLOWLY THAT OTHER PEOPLE COULD HAVE NOTICED. OR THE OPPOSITE, BEING SO FIGETY OR RESTLESS THAT YOU HAVE BEEN MOVING AROUND A LOT MORE THAN USUAL: 0
1. LITTLE INTEREST OR PLEASURE IN DOING THINGS: 0
SUM OF ALL RESPONSES TO PHQ QUESTIONS 1-9: 0
SUM OF ALL RESPONSES TO PHQ QUESTIONS 1-9: 0
4. FEELING TIRED OR HAVING LITTLE ENERGY: 0
9. THOUGHTS THAT YOU WOULD BE BETTER OFF DEAD, OR OF HURTING YOURSELF: 0
3. TROUBLE FALLING OR STAYING ASLEEP: 0
10. IF YOU CHECKED OFF ANY PROBLEMS, HOW DIFFICULT HAVE THESE PROBLEMS MADE IT FOR YOU TO DO YOUR WORK, TAKE CARE OF THINGS AT HOME, OR GET ALONG WITH OTHER PEOPLE: 0
SUM OF ALL RESPONSES TO PHQ QUESTIONS 1-9: 0

## 2022-04-21 NOTE — PROGRESS NOTES
Subjective  Scott Guajardo, 74 y.o. male presents today with:  Chief Complaint   Patient presents with    Establish Care    Dizziness     dizzy spells when exerting something or bending over; is not having one at this current time and they happen more often in the evening            HPI    This is a new patient to me. I have reviewed the past medical and surgical history, social history and family history provided. I have reviewed  medication, previous testing and working diagnoses. I have reviewed the allergies and health maintenance information and correlated it into my decision making for this patient for care, diagnostics, consultations and treatment for today's visit. Has been noting dizziness with bending over. Throughout the day and spontaneously at night time. Feels like he is about to faint. Episodes last 30-60 seconds. No associated chest pain/pressure/tightness, shortness of breath, diaphoresis, palpitations or heart racing. His peripheral vision does get fuzzy and resolved when dizziness does. 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     No other questions and or concerns for today's visit          Past Medical History:   Diagnosis Date    Anemia     CHF [facility]congestive heart failure) ([facility]     Patient denies hx    Depression     Essential hypertension     Fatty liver     GERD (gastroesophageal reflux disease)     Hepatitis C antibody test positive 4/21/2022    Hepatitis C without hepatic coma     Hepatitis C without hepatic coma     Hyperlipidemia     meds > 5 yrs    Hypertension     meds > 5 yrs    Insomnia     Laryngitis from reflux of stomach acid 8/4/2015    Osteoarthritis     Osteoarthritis of spine with radiculopathy, lumbar region 1/19/2016    Prostate cancer ([facility] 2019    OR -- no chemo or radiation    Prostate cancer [facility])      Past Surgical History:   Procedure Laterality Date    BACK SURGERY  2005    lumbar disc OR / fusion  BACK SURGERY  2016    fusion    CARPAL TUNNEL RELEASE Bilateral 2005    CARPAL TUNNEL RELEASE      COLONOSCOPY  3--PROBABLY 08 not14    (-), 2008 POLYPS    COLONOSCOPY N/A 2019    COLORECTAL CANCER SCREENING, NOT HIGH RISK performed by Teri Hernadez MD at [address] [facility], COLON, DIAGNOSTIC      HIP SURGERY Right 9/10/2021    RIGHT HIP JOINT INJECTION W/XRAY. CORTISONE INJECTION   LOCAL. 1:00PM performed by Maria Isabel Mobley MD at / [facility]      RTKR    JOINT REPLACEMENT  2018    LTKR    JOINT REPLACEMENT      KNEE ARTHROSCOPY Bilateral 2014    KNEE SURGERY Left     before LTKR    LUMBAR FUSION N/A 2020    REMOVAL OF HARDWARE AT L3-4 L2,L3,L4 INSTRUMENTATION L2-3 PLIF L3-4 POSTEROLATERAL FUSION performed by Ruslan Redding MD at [facility]  16    [address] Li [address]     VA TOTAL KNEE ARTHROPLASTY Left 2018    LEFT KNEE TOTAL KNEE  ARTHROPLASTY, SUPINE, BLOCK PER TKA PROTOCOL, performed by Mary Goncalves MD at [facility]  2020    [facility]    ROTATOR CUFF REPAIR Right 2006    ROTATOR CUFF REPAIR Right 10/04/2021    SHOULDER SURGERY      SPINE SURGERY      TONSILLECTOMY  1964    TOTAL KNEE ARTHROPLASTY Right 11-3-14    ULTRASOUND PROSTATE/TRANSRECTAL N/A 3/20/2019    PROSTATE TRANSRECTAL ULTRASOUND BIOPSY performed by Tram Watts [facility] at [facility] History     Socioeconomic History    Marital status:      Spouse name: Not on file    Number of children: Not on file    Years of education: Not on file    Highest education level: Not on file   Occupational History    Not on file   Tobacco Use    Smoking status: Current Every Day Smoker     Packs/day: 0.33     Years: 42.00     Pack years: 13.86     Types: Cigarettes, Cigars     Start date:      Last attempt to quit: 3/1/2020     Years since quittin.1    Smokeless tobacco: Never Used    Tobacco comment: Smokes 5 cigars a week   Vaping Use    Vaping Use: Never used   Substance and Sexual Activity    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    Drug use: No    Sexual activity: Yes     Partners: Female   Other Topics Concern    Not on file   Social History Narrative    Not on file     Social Determinants of Health     Financial Resource Strain: Low Risk     Difficulty of Paying Living Expenses: Not hard at all   Food Insecurity: No Food Insecurity    Worried About [address] [employer] in the Last Year: Never true    [address] [facility] Car reviews in the Last Year: Never true   Transportation Needs: No Transportation Needs    Lack of Transportation (Medical): No    Lack of Transportation (Non-Medical):  No   Physical Activity:     Days of Exercise per Week: Not on file    Minutes of Exercise per Session: Not on file   Stress:     Feeling of Stress : Not on file   Social Connections:     Frequency of Communication with Friends and Family: Not on file    Frequency of Social Gatherings with Friends and Family: Not on file    Attends [employer] Services: Not on file    Active Member of [address] [place] [address] or Organizations: Not on file    Attends Club or Organization Meetings: Not on file    Marital Status: Not on file   Intimate Partner Violence:     Fear of Current or Ex-Partner: Not on file    Emotionally Abused: Not on file    Physically Abused: Not on file    Sexually Abused: Not on file   Housing Stability:     Unable to Pay for Housing in the Last Year: Not on file    Number of [place] in the Last Year: Not on file    Unstable Housing in the Last Year: Not on file     Family History   Problem Relation Age of Onset    Thyroid Disease Mother     Macular Degen Mother     Arthritis Mother     High Blood Pressure Father     Heart Attack Father     Arthritis Father     Heart Disease Father     Cancer Father     No Known Problems Brother     Other Brother         viral pneumonia at age 25s    No Known Problems Son      No Known Allergies  Current Outpatient Medications   Medication Sig Dispense Refill    omeprazole (PRILOSEC) 20 MG delayed release capsule Take one po QOD and alternate with 2 po  capsule 1    metoprolol succinate (TOPROL XL) 100 MG extended release tablet One tablet daily 90 tablet 4    atorvastatin (LIPITOR) 20 MG tablet TAKE 1 TABLET BY MOUTH ONE TIME A DAY 30 tablet 5    zoster recombinant adjuvanted vaccine (SHINGRIX) 50 MCG/0.5ML SUSR injection Inject 0.5 mLs into the muscle See Admin Instructions 1 dose now and repeat in 2-6 months 0.5 mL 0    Ascorbic Acid ER 1000 MG TBCR Take 1 tablet by mouth 2 times daily      VITAMIN D PO Cholecalciferol (Vitamin D3) * (VITAMIN D325 MC3) 25 MCG (1,000 UNIT) TABLET Active 25 MCG PO EVERY DAY      Multiple Vitamin (ONE-A-DAY MENS PO) Multivit,Calc,Min/FA/K1/Lycop (ONE-A-DAY MEN'1 EAC5) 240 MCG-30 MCG-300 MCG TABLET Active 1 EACH PO EVERY DAY      Omega-3 Fatty Acids (FISH OIL) 1000 MG CAPS Omega-3 Fatty Acids * (FISH OIL 1,0001 EAC6) 1,000 MG CAPSULE Active 1000 MG PO EVERY DAY      Coenzyme Q10 (COQ-10 PO) Ubidecarenone (COQ-25735 MG) 100 MG CAPSULE Active 200 MG PO EVERY DAY      acetaminophen (TYLENOL) 500 MG tablet Acetaminophen * (USLOQEDAKVTOI718 M1) 500 MG TABLET Active 1000 MG PO EVERY 8 HOURS 42 7 October 4th, 2021 1:37pm      magnesium oxide (MAG-OX) 400 MG tablet Take 1 tablet by mouth daily      melatonin 3 MG TABS tablet Melatonin * (MELATONIN3 MG) 3 MG TABLET Active 3 MG PO AT BEDTIME AS NEEDED as needed for SLEEP      fluticasone (FLONASE) 50 MCG/ACT nasal spray 1 spray by Nasal route daily 1 each 5    Boswellia-Glucosamine-Vit D (OSTEO BI-FLEX ONE PER DAY PO) Glucosamine/D3/Boswellia Ximena (OSTEO BI-FLEX1 EAC2) 1 EACH TABLET Active 2 EACH PO HS       No current facility-administered medications for this visit. PMH, Surgical Hx, Family Hx, and Social Hxreviewed and updated. Health Maintenance reviewed.     Objective    Vitals:    04/21/22 0811   BP: 138/70   Pulse: 74   Resp: 15   Temp: 96.7 °F (35.9 °C)   TempSrc: Temporal   SpO2: 98%   Weight: 214 lb (97.1 kg)   Height: 5' 8\" (1.727 m)        Physical Exam  Constitutional:       Appearance: He is well-developed. HENT:      Head: Normocephalic and atraumatic. Eyes:      Conjunctiva/sclera: Conjunctivae normal.   Neck:      Thyroid: No thyromegaly. Vascular: Normal carotid pulses. No carotid bruit. Cardiovascular:      Rate and Rhythm: Normal rate and regular rhythm. No extrasystoles are present. Pulses:           Carotid pulses are 1+ on the right side and 1+ on the left side. Heart sounds: S1 normal and S2 normal.   Pulmonary:      Effort: Pulmonary effort is normal.      Breath sounds: No wheezing or rales. Musculoskeletal:      Cervical back: Normal range of motion and neck supple. Right lower leg: No edema. Left lower leg: No edema. Lymphadenopathy:      Cervical: No cervical adenopathy. Skin:     General: Skin is warm and dry. Neurological:      General: No focal deficit present. Mental Status: He is alert and oriented to person, place, and time. Cranial Nerves: Cranial nerves are intact. Motor: Motor function is intact. Coordination: Coordination is intact. Gait: Gait is intact. Normal orthostatics.   EKG NSR without acute or chronic changes  Lab Results   Component Value Date    WBC 8.0 12/01/2021    HGB 15.1 12/01/2021    HCT 45.0 12/01/2021     12/01/2021    CHOL 145 12/08/2018    TRIG 53 12/08/2018    HDL 41 12/01/2021    ALT 49 (H) 12/01/2021    AST 32 12/01/2021     12/01/2021    K 4.5 12/01/2021     12/01/2021    CREATININE 1.02 12/01/2021    BUN 21 12/01/2021    CO2 22 12/01/2021    TSH 1.610 05/08/2020    PSA 0.01 12/01/2021    INR 1.0 09/21/2020    LABA1C 6.0 (H) 12/01/2021       Lab Results   Component Value Date    LABA1C 6.0 (H) 12/01/2021    LABA1C 5.9 09/04/2019    LABA1C 5.9 12/08/2018 Lab Results   Component Value Date    CREATININE 1.02 12/01/2021     Lab Results   Component Value Date    ALT 49 (H) 12/01/2021    AST 32 12/01/2021     Lab Results   Component Value Date    CHOL 145 12/08/2018    TRIG 53 12/08/2018    HDL 41 12/01/2021    LDLCALC 89 12/01/2021        Assessment & Plan   Visit Diagnoses and Associated Orders     Pre-syncope    -  Primary    Eval for dysrhythmia, electrolyte abnl, metabolic abnl, anemia with labs and event monitor    Cardiac event monitor [70716 Custom]   - Future Order         Dizziness        Eval for dysrhythmia, electrolyte abnl, metabolic abnl, anemia with labs and event monitor    CBC with Auto Differential [41337 Custom]   - Future Order    Magnesium [63589 Custom]   - Future Order    EKG 12 Lead [53516 Custom]   - Future Order    Orthostatic blood pressure and pulse [AUD200 Custom]      EKG 12 Lead [23379 Custom]           Mixed hyperlipidemia        follow labs    atorvastatin (LIPITOR) 20 MG tablet [21046]      Lipid, Fasting [58134 Custom]   - Future Order         Essential hypertension        well-controlled with metoprolol    metoprolol succinate (TOPROL XL) 100 MG extended release tablet [10741]           Hyperglycemia        reassess A1C    Hemoglobin A1C [08178 Custom]   - Future Order         Gastroesophageal reflux disease, unspecified whether esophagitis present        stable with decreasing use of PPI. Goal: taper to prn use or to H2B.    omeprazole (PRILOSEC) 20 MG delayed release capsule [25179]           Prostate cancer (HCC)        stable, well-contrlled; follow PSA         Hepatitis C antibody test positive        reassess for HCV RNA    Hepatitis C RNA, quantitative, PCR [21919 Custom]   - Future Order         Anti-TPO antibodies present        Unclear why initial TPO was assessed given normal TSH and T4. Given current sx, reassess.     TSH [70174 Custom]   - Future Order         Screening for AAA (abdominal aortic aneurysm)        US SCREENING FOR AAA [06428 Custom]   - Future Order    PSA Screening [ Custom]   - Future Order         Need for shingles vaccine        zoster recombinant adjuvanted vaccine [facility]) 50 MCG/0.5ML SUSR injection [[id number]]           Need for pneumococcal vaccination        PNEUMOVAX 23 subcutaneous/IM (Pneumococcal polysaccharide vaccine 23-valent >= 1yo) [91482 Custom]               Time spent on appointment included reviewing past laboratory and radiographic results, previous notes, consultations, past procedures, medications, obtaining history and performing physical, formulating mutually agreed upon plan of care with patient - 50 minutes. .       Reviewed with the patient: all disease processes, current clinical status, medications, activities and diet.      Side effects, adverse effects of the medication prescribed today, as well as treatment plan/ rationale and result expectations have been discussed with the patient who expresses understanding and desires to proceed.     Close follow up to evaluate treatment results and for coordination of care. I have reviewed the patient's medical history in detail and updated the computerized patient record. More than 50% of the appointment was spent in face-to-face counseling, education and care coordination. Please note this report has been partially produced using speech recognition software and may contain mistakes related to that system including errors in grammar, punctuation and spelling as well as words and phrases that may seem inappropriate. If there are questions or concerns, please feel free to contact me to clarify. Orders Placed This Encounter   Procedures    US SCREENING FOR AAA     This procedure can be scheduled via [employer]. Access your [employer] account by visiting [email or web address].      Standing Status:   Future     Standing Expiration Date:   4/21/2023     Order Specific Question:   Reason for exam:     Answer:   screen    PNEUMOVAX 23 subcutaneous/IM (Pneumococcal polysaccharide vaccine 23-valent >= 1yo)    TSH     Standing Status:   Future     Number of Occurrences:   1     Standing Expiration Date:   4/21/2023    Hepatitis C RNA, quantitative, PCR     Standing Status:   Future     Number of Occurrences:   1     Standing Expiration Date:   4/21/2023    Hemoglobin A1C     Standing Status:   Future     Number of Occurrences:   1     Standing Expiration Date:   6/21/2022    Lipid, Fasting     Standing Status:   Future     Number of Occurrences:   1     Standing Expiration Date:   4/21/2023    CBC with Auto Differential     Standing Status:   Future     Number of Occurrences:   1     Standing Expiration Date:   4/21/2023    PSA Screening     Standing Status:   Future     Number of Occurrences:   1     Standing Expiration Date:   4/21/2023    Magnesium     Standing Status:   Future     Number of Occurrences:   1     Standing Expiration Date:   4/21/2023    Orthostatic blood pressure and pulse    Cardiac event monitor     Standing Status:   Future     Standing Expiration Date:   6/20/2022     Scheduling Instructions:      Monitor for 2 weeks    EKG 12 Lead     Standing Status:   Future     Number of Occurrences:   1     Standing Expiration Date:   5/21/2022     Order Specific Question:   Reason for Exam?     Answer:   Dizziness     Orders Placed This Encounter   Medications    omeprazole (PRILOSEC) 20 MG delayed release capsule     Sig: Take one po QOD and alternate with 2 po QOD     Dispense:  130 capsule     Refill:  1     No more refills until a follow up appointment is scheduled.     metoprolol succinate (TOPROL XL) 100 MG extended release tablet     Sig: One tablet daily     Dispense:  90 tablet     Refill:  4    atorvastatin (LIPITOR) 20 MG tablet     Sig: TAKE 1 TABLET BY MOUTH ONE TIME A DAY     Dispense:  30 tablet     Refill:  5    zoster recombinant adjuvanted vaccine (SHINGRIX) 50 MCG/0.5ML SUSR injection     Sig: Inject 0.5 mLs into the muscle See Admin Instructions 1 dose now and repeat in 2-6 months     Dispense:  0.5 mL     Refill:  0     Medications Discontinued During This Encounter   Medication Reason    celecoxib (CELEBREX) 200 MG capsule LIST CLEANUP    omeprazole (PRILOSEC) 20 MG delayed release capsule REORDER    metoprolol succinate (TOPROL XL) 100 MG extended release tablet REORDER    atorvastatin (LIPITOR) 20 MG tablet REORDER     Return in about 4 weeks (around 5/19/2022) for pre-syncope. Controlled Substance Monitoring:    Acute and Chronic Pain Monitoring:   RX Monitoring 10/16/2020   Attestation -   Periodic Controlled Substance Monitoring Possible medication side effects, risk of tolerance/dependence & alternative treatments discussed. Chronic Pain > 50 MEDD Re-evaluated the status of the patient's underlying condition causing pain.            Jodi Thorpe MD

## 2022-04-24 LAB
HCV QNT BY NAAT IU/ML: NOT DETECTED IU/ML
HCV QNT BY NAAT LOG IU/ML: NOT DETECTED LOG IU/ML
INTERPRETATION: NOT DETECTED

## 2022-05-09 ENCOUNTER — HOSPITAL ENCOUNTER (OUTPATIENT)
Dept: ULTRASOUND IMAGING | Age: 67
Discharge: HOME | End: 2022-05-11
Payer: MEDICARE

## 2022-05-09 ENCOUNTER — HOSPITAL ENCOUNTER (OUTPATIENT)
Dept: NON INVASIVE DIAGNOSTICS | Age: 67
Discharge: HOME | End: 2022-05-09
Payer: MEDICARE

## 2022-05-09 DIAGNOSIS — R55 PRE-SYNCOPE: ICD-10-CM

## 2022-05-09 DIAGNOSIS — Z13.6 SCREENING FOR AAA (ABDOMINAL AORTIC ANEURYSM): ICD-10-CM

## 2022-05-09 PROCEDURE — 93270 REMOTE 30 DAY ECG REV/REPORT: CPT

## 2022-05-09 PROCEDURE — 93978 VASCULAR STUDY: CPT

## 2022-05-27 NOTE — PROCEDURES
[address]                      [address], [address] [facility]                                 EVENT MONITOR    PATIENT NAME: Jonh Delarosa                    :        1955  MED REC NO:   [id number]                            ROOM:  ACCOUNT NO:   [de-identified]                           ADMIT DATE: 2022  PROVIDER:     Charo Vizcarra MD    TWO-WEEK EVENT MONITOR    ORDERING PROVIDER:  Teresa Gonzalez MD    INDICATION:  Syncope. EVENT MONITORING SUMMARY ANALYSIS:  Monitor started on 2022 and  continued for 13 days and 12 hours. Most frequent symptomatic event was  syncope, which correlated with supraventricular ectopy. Predominant  rhythm was sinus 90.6% of the total recording time. The average heart  rate was 77 beats per minute. The slowest heart rate was 52 beats per  minute. The fastest heart rate was 124 beats per minute occurring at  1825. The slowest MM interval with some bradycardia at rate of 43 beats  per minute was 1.4 seconds. Supraventricular ectopy activity consisted  of 15 runs. Fastest episode of supraventricular tachycardia have 13  beats occurring on day 2, lasting 5 seconds with average heart rate of  155 beats per minute. The longest episode have 14 beats occurring on  day 3 lasting 7 seconds with average rate of 112 beats per minute. There was no atrial fibrillation detected. There was no ventricular  tachycardia detected. CONCLUSION:  1. Predominant rhythm was sinus at 90.6% of the total time. 2.  This event recorder was significant for triggered symptoms  correlated with an episode of SVT. 3.  No ventricular tachycardia detected. 4.  No atrial fibrillation detected. 5.  Clinical correlation is advised.         Bette Nunez MD    D: 2022 18:48:17       T: 2022 4:57:19     [email or web address]  Job#: [id number]     Doc#: [id number]    CC:

## 2022-05-30 PROCEDURE — 93272 ECG/REVIEW INTERPRET ONLY: CPT | Performed by: INTERNAL MEDICINE

## 2022-05-31 DIAGNOSIS — R42 DIZZINESS: Primary | ICD-10-CM

## 2022-05-31 DIAGNOSIS — R55 PRE-SYNCOPE: ICD-10-CM

## 2022-05-31 DIAGNOSIS — I10 ESSENTIAL HYPERTENSION: ICD-10-CM

## 2022-05-31 DIAGNOSIS — I47.1 SVT (SUPRAVENTRICULAR TACHYCARDIA) (HCC): ICD-10-CM

## 2022-05-31 NOTE — RESULT ENCOUNTER NOTE
Call patient please. THe cardiac event monitor indicates there were a few episodes of SVT (rapid heart rate) which did not last more than a few seconts but which were associated with his symptoms. Usual treatment for this is metoprolol which he is already on. I have referred him to Dr. Dread Garcia. (Holiday only) for cardiology evaluation.

## 2022-06-01 ENCOUNTER — TELEPHONE (OUTPATIENT)
Dept: FAMILY MEDICINE CLINIC | Age: 67
End: 2022-06-01

## 2022-06-01 NOTE — TELEPHONE ENCOUNTER
Patient aware of results. Apt made to see Dr. Gela Ramirez.      ----- Message from Elizabeth Heard MD sent at 5/31/2022  5:51 PM EDT -----  Call patient please. THe cardiac event monitor indicates there were a few episodes of SVT (rapid heart rate) which did not last more than a few seconts but which were associated with his symptoms. Usual treatment for this is metoprolol which he is already on. I have referred him to Dr. Gela Ramirez. (Holiday only) for cardiology evaluation.

## 2022-06-07 ENCOUNTER — OFFICE VISIT (OUTPATIENT)
Dept: CARDIOLOGY CLINIC | Age: 67
End: 2022-06-07
Payer: MEDICARE

## 2022-06-07 VITALS
DIASTOLIC BLOOD PRESSURE: 70 MMHG | SYSTOLIC BLOOD PRESSURE: 110 MMHG | WEIGHT: 210 LBS | HEART RATE: 73 BPM | BODY MASS INDEX: 31.93 KG/M2

## 2022-06-07 DIAGNOSIS — I47.1 PSVT (PAROXYSMAL SUPRAVENTRICULAR TACHYCARDIA) (HCC): ICD-10-CM

## 2022-06-07 DIAGNOSIS — E78.2 MIXED HYPERLIPIDEMIA: ICD-10-CM

## 2022-06-07 DIAGNOSIS — I10 ESSENTIAL HYPERTENSION: Primary | ICD-10-CM

## 2022-06-07 PROBLEM — I47.10 PSVT (PAROXYSMAL SUPRAVENTRICULAR TACHYCARDIA): Status: ACTIVE | Noted: 2022-06-07

## 2022-06-07 PROCEDURE — 93000 ELECTROCARDIOGRAM COMPLETE: CPT | Performed by: INTERNAL MEDICINE

## 2022-06-07 PROCEDURE — 1123F ACP DISCUSS/DSCN MKR DOCD: CPT | Performed by: INTERNAL MEDICINE

## 2022-06-07 PROCEDURE — 99204 OFFICE O/P NEW MOD 45 MIN: CPT | Performed by: INTERNAL MEDICINE

## 2022-06-07 NOTE — PROGRESS NOTES
Chief Complaint   Patient presents with    Tachycardia    Dizziness       6-7-22: Patient presents for initial medical evaluation. Patient is followed on a regular basis by Dr. Yonatan Dee PA-C. Patient with past medical history of hypertension hyperlipidemia. No previous history of congestive heart failure. Status post 2-week event monitor with 1 episode of 13 beat run of paroxysmal supraventricular tachycardia lasted 5seconds with a heart rate of 155 beats. Patient is already on Toprol- mg daily as well as magnesium oxide. EKG today with normal sinus rhythm no was evidence of any ischemia. Patient does complain of dizziness at times. Pt denies chest pain, dyspnea, dyspnea on exertion, change in exercise capacity, fatigue,  nausea, vomiting, diarrhea, constipation, motor weakness, insomnia, weight loss, syncope, , PND, orthopnea, or claudication. + smoker, 0.5ppd. no excessive caffeine. Status post normal nuclear stress test in 2018. Status post echocardiogram and June 2018 with ejection fraction of 65% mild concentric left ventricle hypertrophy grade 1 diastolic dysfunction no significant valve abnormality.           Patient Active Problem List   Diagnosis    Hyperlipidemia    Essential hypertension    Depression    GERD (gastroesophageal reflux disease)    Osteoarthritis    Lumbar stenosis    Lumbar degenerative disc disease    Osteoarthritis of spine with radiculopathy, lumbar region    Osteoarthritis of left knee    Adenomatous polyp of descending colon    Adenomatous polyp of sigmoid colon    Prostate cancer ([address]    Hepatitis C without hepatic coma    Fatty liver    Pseudoarthrosis of lumbar spine    Lumbar spondylosis    Acute bacterial sinusitis    Hepatitis C antibody test positive    PSVT (paroxysmal supraventricular tachycardia) ([address]       Past Surgical History:   Procedure Laterality Date    BACK SURGERY  2005    lumbar disc OR / fusion    BACK SURGERY 2016    fusion    CARPAL TUNNEL RELEASE Bilateral 2005    CARPAL TUNNEL RELEASE      COLONOSCOPY  3-2014-PROBABLY 08 not14    (-), 2008 POLYPS    COLONOSCOPY N/A 2019    COLORECTAL CANCER SCREENING, NOT HIGH RISK performed by Yuki Finley MD at [address] [facility], COLON, DIAGNOSTIC      HIP SURGERY Right 9/10/2021    RIGHT HIP JOINT INJECTION W/XRAY. CORTISONE INJECTION   LOCAL. 1:00PM performed by Walker Rosas MD at [address]      RTKR    JOINT REPLACEMENT  2018    LTKR    JOINT REPLACEMENT      KNEE ARTHROSCOPY Bilateral 2014    KNEE SURGERY Left     before LTKR    LUMBAR FUSION N/A 2020    REMOVAL OF HARDWARE AT L3-4 L2,L3,L4 INSTRUMENTATION L2-3 PLIF L3-4 POSTEROLATERAL FUSION performed by Kevin Winkler MD at [address] [facility]  16    [address]     [place] TOTAL KNEE ARTHROPLASTY Left 2018    LEFT KNEE TOTAL KNEE  ARTHROPLASTY, SUPINE, BLOCK PER TKA PROTOCOL, performed by Julieta Carlos MD at [address] [facility]  2020    [facility]    ROTATOR CUFF REPAIR Right     ROTATOR CUFF REPAIR Right 10/04/2021    SHOULDER SURGERY      SPINE SURGERY      TONSILLECTOMY  1964    TOTAL KNEE ARTHROPLASTY Right 11-3-14    ULTRASOUND PROSTATE/TRANSRECTAL N/A 3/20/2019    PROSTATE TRANSRECTAL ULTRASOUND BIOPSY performed by Ace Valera MD at [facility] History     Socioeconomic History    Marital status:      Spouse name: Not on file    Number of children: Not on file    Years of education: Not on file    Highest education level: Not on file   Occupational History    Not on file   Tobacco Use    Smoking status: Current Every Day Smoker     Packs/day: 0.33     Years: 42.00     Pack years: 13.86     Types: Cigarettes, Cigars     Start date:      Last attempt to quit: 3/1/2020     Years since quittin.2    Smokeless tobacco: Never Used    Tobacco comment: Smokes 5 cigars a week   Vaping Use  Vaping Use: Never used   Substance and Sexual Activity    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    Drug use: No    Sexual activity: Yes     Partners: Female   Other Topics Concern    Not on file   Social History Narrative    Not on file     Social Determinants of Health     Financial Resource Strain:     Difficulty of Paying Living Expenses: Not on file   Food Insecurity:     Worried About Running Out of Food in the Last Year: Not on file    Abdulkadir of Food in the Last Year: Not on file   Transportation Needs:     Lack of Transportation (Medical): Not on file    Lack of Transportation (Non-Medical):  Not on file   Physical Activity:     Days of Exercise per Week: Not on file    Minutes of Exercise per Session: Not on file   Stress:     Feeling of Stress : Not on file   Social Connections:     Frequency of Communication with Friends and Family: Not on file    Frequency of Social Gatherings with Friends and Family: Not on file    Attends Quaker Services: Not on file    Active Member of [address] [place] [address] or Organizations: Not on file    Attends Club or Organization Meetings: Not on file    Marital Status: Not on file   Intimate Partner Violence:     Fear of Current or Ex-Partner: Not on file    Emotionally Abused: Not on file    Physically Abused: Not on file    Sexually Abused: Not on file   Housing Stability:     Unable to Pay for Housing in the Last Year: Not on file    Number of [place] in the Last Year: Not on file    Unstable Housing in the Last Year: Not on file       Family History   Problem Relation Age of Onset    Thyroid Disease Mother     Macular Degen Mother     Arthritis Mother     High Blood Pressure Father     Heart Attack Father     Arthritis Father     Heart Disease Father     Cancer Father     No Known Problems Brother     Other Brother         viral pneumonia at age 25s    No Known Problems Son        Current Outpatient Medications   Medication Sig Dispense Refill    omeprazole (PRILOSEC) 20 MG delayed release capsule Take one po QOD and alternate with 2 po  capsule 1    metoprolol succinate (TOPROL XL) 100 MG extended release tablet One tablet daily 90 tablet 4    atorvastatin (LIPITOR) 20 MG tablet TAKE 1 TABLET BY MOUTH ONE TIME A DAY 30 tablet 5    zoster recombinant adjuvanted vaccine (SHINGRIX) 50 MCG/0.5ML SUSR injection Inject 0.5 mLs into the muscle See Admin Instructions 1 dose now and repeat in 2-6 months 0.5 mL 0    Ascorbic Acid ER 1000 MG TBCR Take 1 tablet by mouth 2 times daily      VITAMIN D PO Cholecalciferol (Vitamin D3) * (VITAMIN D325 MC3) 25 MCG (1,000 UNIT) TABLET Active 25 MCG PO EVERY DAY      Boswellia-Glucosamine-Vit D (OSTEO BI-FLEX ONE PER DAY PO) Glucosamine/D3/Boswellia Ximena (OSTEO BI-FLEX1 EAC2) 1 EACH TABLET Active 2 EACH PO HS      Multiple Vitamin (ONE-A-DAY MENS PO) Multivit,Calc,Min/FA/K1/Lycop (ONE-A-DAY MEN'1 EAC5) 240 MCG-30 MCG-300 MCG TABLET Active 1 EACH PO EVERY DAY      Omega-3 Fatty Acids (FISH OIL) 1000 MG CAPS Omega-3 Fatty Acids * (FISH OIL 1,0001 EAC6) 1,000 MG CAPSULE Active 1000 MG PO EVERY DAY      Coenzyme Q10 (COQ-10 PO) Ubidecarenone (COQ-86693 MG) 100 MG CAPSULE Active 200 MG PO EVERY DAY      acetaminophen (TYLENOL) 500 MG tablet Acetaminophen * ([id number] M1) 500 MG TABLET Active 1000 MG PO EVERY 8 HOURS 42 7 October 4th, 2021 1:37pm      magnesium oxide (MAG-OX) 400 MG tablet Take 1 tablet by mouth daily      melatonin 3 MG TABS tablet Melatonin * (MELATONIN3 MG) 3 MG TABLET Active 3 MG PO AT BEDTIME AS NEEDED as needed for SLEEP      fluticasone (FLONASE) 50 MCG/ACT nasal spray 1 spray by Nasal route daily 1 each 5     No current facility-administered medications for this visit. Patient has no known allergies.     Review of Systems:  General ROS: negative  Psychological ROS: negative  Hematological and Lymphatic ROS: No history of blood clots or bleeding disorder. Respiratory ROS: no cough, shortness of breath, or wheezing  Cardiovascular ROS: no chest pain or dyspnea on exertion  Gastrointestinal ROS: no abdominal pain, change in bowel habits, or black or bloody stools  Genito-Urinary ROS: no dysuria, trouble voiding, or hematuria  Musculoskeletal ROS: negative  Neurological ROS: no TIA or stroke symptoms  Dermatological ROS: negative    VITALS:  Blood pressure 110/70, pulse 73, weight 210 lb (95.3 kg). Body mass index is 31.93 kg/m². Physical Examination:  General appearance - alert, well appearing, and in no distress  Mental status - alert, oriented to person, place, and time  Neck - Neck is supple, no JVD or carotid bruits. No thyromegaly or adenopathy. Chest - clear to auscultation, no wheezes, rales or rhonchi, symmetric air entry  Heart - normal rate, regular rhythm, normal S1, S2, no murmurs, rubs, clicks or gallops  Abdomen - soft, nontender, nondistended, no masses or organomegaly  Neurological - alert, oriented, normal speech, no focal findings or movement disorder noted  Extremities - peripheral pulses normal, no pedal edema, no clubbing or cyanosis  Skin - normal coloration and turgor, no rashes, no suspicious skin lesions noted      EKG: normal sinus rhythm, nonspecific ST and T waves changes    Orders Placed This Encounter   Procedures    EKG 12 Lead       ASSESSMENT:     Diagnosis Orders   1. Essential hypertension  EKG 12 Lead   2. PSVT (paroxysmal supraventricular tachycardia) ([address]     3. Mixed hyperlipidemia           PLAN:         As always, aggressive risk factor modification is strongly recommended. We should adhere to the JNC VIII guidelines for HTN management and the NCEP ATP III guidelines for LDL-C management. Cardiac diet is always recommended with low fat, cholesterol, calories and sodium. Continue medications at current doses. Check EKG    Patient is to avoid any excessive caffeine, chocolate, or OTC stimulants. Consider echo in future. Patient was advised and encouraged to check blood pressure at home or at a pharmacy, maintain a logbook, and also call us back if blood pressure are above the target ranges or if it is low. Patient clearly understands and agrees to the instructions. We will need to continue to monitor muscle and liver enzymes, BUN, CR, and electrolytes.

## 2022-06-08 ENCOUNTER — OFFICE VISIT (OUTPATIENT)
Dept: FAMILY MEDICINE CLINIC | Age: 67
End: 2022-06-08
Payer: MEDICARE

## 2022-06-08 VITALS
TEMPERATURE: 96.1 F | SYSTOLIC BLOOD PRESSURE: 126 MMHG | HEART RATE: 70 BPM | BODY MASS INDEX: 31.83 KG/M2 | WEIGHT: 210 LBS | HEIGHT: 68 IN | DIASTOLIC BLOOD PRESSURE: 80 MMHG | OXYGEN SATURATION: 96 %

## 2022-06-08 DIAGNOSIS — R42 DIZZINESS: ICD-10-CM

## 2022-06-08 DIAGNOSIS — I47.1 PSVT (PAROXYSMAL SUPRAVENTRICULAR TACHYCARDIA) (HCC): ICD-10-CM

## 2022-06-08 DIAGNOSIS — E78.2 MIXED HYPERLIPIDEMIA: ICD-10-CM

## 2022-06-08 DIAGNOSIS — Z00.00 WELCOME TO MEDICARE PREVENTIVE VISIT: Primary | ICD-10-CM

## 2022-06-08 DIAGNOSIS — I10 ESSENTIAL HYPERTENSION: Primary | ICD-10-CM

## 2022-06-08 DIAGNOSIS — F17.200 TOBACCO USE DISORDER: Chronic | ICD-10-CM

## 2022-06-08 DIAGNOSIS — Z87.891 PERSONAL HISTORY OF TOBACCO USE, PRESENTING HAZARDS TO HEALTH: ICD-10-CM

## 2022-06-08 DIAGNOSIS — R73.9 HYPERGLYCEMIA: ICD-10-CM

## 2022-06-08 PROCEDURE — 99406 BEHAV CHNG SMOKING 3-10 MIN: CPT | Performed by: FAMILY MEDICINE

## 2022-06-08 PROCEDURE — 99214 OFFICE O/P EST MOD 30 MIN: CPT | Performed by: FAMILY MEDICINE

## 2022-06-08 PROCEDURE — 1123F ACP DISCUSS/DSCN MKR DOCD: CPT | Performed by: FAMILY MEDICINE

## 2022-06-08 PROCEDURE — G0402 INITIAL PREVENTIVE EXAM: HCPCS | Performed by: FAMILY MEDICINE

## 2022-06-08 RX ORDER — VARENICLINE TARTRATE 1 MG/1
1 TABLET, FILM COATED ORAL 2 TIMES DAILY
Qty: 180 TABLET | Refills: 1 | Status: SHIPPED | OUTPATIENT
Start: 2022-06-08

## 2022-06-08 RX ORDER — VARENICLINE TARTRATE 0.5 MG/1
.5-1 TABLET, FILM COATED ORAL SEE ADMIN INSTRUCTIONS
Qty: 57 TABLET | Refills: 0 | Status: SHIPPED | OUTPATIENT
Start: 2022-06-08

## 2022-06-08 ASSESSMENT — PATIENT HEALTH QUESTIONNAIRE - PHQ9
3. TROUBLE FALLING OR STAYING ASLEEP: 0
SUM OF ALL RESPONSES TO PHQ QUESTIONS 1-9: 2
9. THOUGHTS THAT YOU WOULD BE BETTER OFF DEAD, OR OF HURTING YOURSELF: 0
2. FEELING DOWN, DEPRESSED OR HOPELESS: 1
7. TROUBLE CONCENTRATING ON THINGS, SUCH AS READING THE NEWSPAPER OR WATCHING TELEVISION: 0
1. LITTLE INTEREST OR PLEASURE IN DOING THINGS: 1
6. FEELING BAD ABOUT YOURSELF - OR THAT YOU ARE A FAILURE OR HAVE LET YOURSELF OR YOUR FAMILY DOWN: 0
SUM OF ALL RESPONSES TO PHQ QUESTIONS 1-9: 2
8. MOVING OR SPEAKING SO SLOWLY THAT OTHER PEOPLE COULD HAVE NOTICED. OR THE OPPOSITE, BEING SO FIGETY OR RESTLESS THAT YOU HAVE BEEN MOVING AROUND A LOT MORE THAN USUAL: 0
SUM OF ALL RESPONSES TO PHQ9 QUESTIONS 1 & 2: 2
SUM OF ALL RESPONSES TO PHQ QUESTIONS 1-9: 2
5. POOR APPETITE OR OVEREATING: 0
4. FEELING TIRED OR HAVING LITTLE ENERGY: 0
SUM OF ALL RESPONSES TO PHQ QUESTIONS 1-9: 2
10. IF YOU CHECKED OFF ANY PROBLEMS, HOW DIFFICULT HAVE THESE PROBLEMS MADE IT FOR YOU TO DO YOUR WORK, TAKE CARE OF THINGS AT HOME, OR GET ALONG WITH OTHER PEOPLE: 0

## 2022-06-08 ASSESSMENT — LIFESTYLE VARIABLES: HOW OFTEN DO YOU HAVE A DRINK CONTAINING ALCOHOL: NEVER

## 2022-06-08 NOTE — PROGRESS NOTES
Subjective  Shivam Crowder, 74 y.o. male presents today with:  Chief Complaint   Patient presents with    Follow-up     1 month follow up            HPI    04/21/2022: This is a new patient to me. I have reviewed the past medical and surgical history, social history and family history provided. I have reviewed  medication, previous testing and working diagnoses. I have reviewed the allergies and health maintenance information and correlated it into my decision making for this patient for care, diagnostics, consultations and treatment for today's visit.     Has been noting dizziness with bending over. Throughout the day and spontaneously at night time. Feels like he is about to faint. Episodes last 30-60 seconds. No associated chest pain/pressure/tightness, shortness of breath, diaphoresis, palpitations or heart racing. His peripheral vision does get fuzzy and resolved when dizziness does. 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 06/08/2022: A1C 6.0    The 10-year ASCVD risk score (Charlotte Andrea., et al., 2013) is: 14.9%    Values used to calculate the score:      Age: 74 years      Sex: Male      Is Non- : No      Diabetic: No      Tobacco smoker: Yes      Systolic Blood Pressure: 398 mmHg      Is BP treated: No      HDL Cholesterol: 43 mg/dL      Total Cholesterol: 145 mg/dL     Has been started on atorvastatin. Event monitor: 2 episodes of SVT 5 sec at 155 and 7 seconds at 112. They were not associated with sx of dizziness. Dr. Daniel Grant concurs with BB treatment. Dizziness: Has had few incidents since last visit.      No other questions and or concerns for today's visit          Past Medical History:   Diagnosis Date    Anemia     CHF ([facility]estive heart failure) [facility]     Patient denies hx    Depression     Essential hypertension     Fatty liver     GERD (gastroesophageal reflux disease)     Hepatitis C antibody test positive 4/21/2022    Hepatitis C without hepatic coma     Hepatitis C without hepatic coma     Hyperlipidemia     meds > 5 yrs    Hypertension     meds > 5 yrs    Insomnia     Laryngitis from reflux of stomach acid 8/4/2015    Osteoarthritis     Osteoarthritis of spine with radiculopathy, lumbar region 1/19/2016    Prostate cancer ([facility] 2019    OR -- no chemo or radiation    Prostate cancer ([address]     PSVT (paroxysmal supraventricular tachycardia) ([address] 6/7/2022    Tobacco use disorder 6/8/2022     Past Surgical History:   Procedure Laterality Date    BACK SURGERY  2005    lumbar disc OR / fusion    BACK SURGERY  2016    fusion    CARPAL TUNNEL RELEASE Bilateral 2005    CARPAL TUNNEL RELEASE      COLONOSCOPY  3-2014-PROBABLY 08 not14    (-), 2008 POLYPS    COLONOSCOPY N/A 8/9/2019    COLORECTAL CANCER SCREENING, NOT HIGH RISK performed by Russell Sanchez MD at [address] [facility], COLON, DIAGNOSTIC      HIP SURGERY Right 9/10/2021    RIGHT HIP JOINT INJECTION W/XRAY. CORTISONE INJECTION   LOCAL. 1:00PM performed by John Graf MD at [address]  2012    RTKR    JOINT REPLACEMENT  2018    LTKR    JOINT REPLACEMENT      KNEE ARTHROSCOPY Bilateral 2014    KNEE SURGERY Left     before LTKR    LUMBAR FUSION N/A 9/28/2020    REMOVAL OF HARDWARE AT L3-4 L2,L3,L4 INSTRUMENTATION L2-3 PLIF L3-4 POSTEROLATERAL FUSION performed by Lee Ann Cadet MD at [address] [facility] [address]  2/24/16     [facility] [place] TOTAL KNEE ARTHROPLASTY Left 6/18/2018    LEFT KNEE TOTAL KNEE  ARTHROPLASTY, SUPINE, BLOCK PER TKA PROTOCOL, performed by Sandeep Gonzalez MD at [address] [facility]  06/2020    [facility]    ROTATOR CUFF REPAIR Right 2006    ROTATOR CUFF REPAIR Right 10/04/2021    SHOULDER SURGERY      SPINE SURGERY      TONSILLECTOMY  1964    TOTAL KNEE ARTHROPLASTY Right 11-3-14    ULTRASOUND PROSTATE/TRANSRECTAL N/A 3/20/2019    PROSTATE TRANSRECTAL ULTRASOUND BIOPSY performed by Sussy Maurer MD at [address] [facility] History     Socioeconomic History    Marital status:      Spouse name: Not on file    Number of children: Not on file    Years of education: Not on file    Highest education level: Not on file   Occupational History    Not on file   Tobacco Use    Smoking status: Current Every Day Smoker     Packs/day: 0.33     Years: 42.00     Pack years: 13.86     Types: Cigarettes, Cigars     Start date:      Last attempt to quit: 3/1/2020     Years since quittin.2    Smokeless tobacco: Never Used    Tobacco comment: Smokes 5 cigars a week   Vaping Use    Vaping Use: Never used   Substance and Sexual Activity    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    Drug use: No    Sexual activity: Yes     Partners: Female   Other Topics Concern    Not on file   Social History Narrative    Not on file     Social Determinants of Health     Financial Resource Strain:     Difficulty of Paying Living Expenses: Not on file   Food Insecurity:     Worried About [address] in the Last Year: Not on file    [address] in the Last Year: Not on file   Transportation Needs:     Lack of Transportation (Medical): Not on file    Lack of Transportation (Non-Medical):  Not on file   Physical Activity: Insufficiently Active    Days of Exercise per Week: 3 days    Minutes of Exercise per Session: 30 min   Stress:     Feeling of Stress : Not on file   Social Connections:     Frequency of Communication with Friends and Family: Not on file    Frequency of Social Gatherings with Friends and Family: Not on file    Attends Christian Services: Not on file    Active Member of Clubs or Organizations: Not on file    Attends Club or Organization Meetings: Not on file    Marital Status: Not on file   Intimate Partner Violence:     Fear of Current or Ex-Partner: Not on file    Emotionally Abused: Not on file    Physically Abused: Not on file   [facility] Sexually Abused: Not on file   Housing Stability:     Unable to Pay for Housing in the Last Year: Not on file    Number of Places Lived in the Last Year: Not on file    Unstable Housing in the Last Year: Not on file     Family History   Problem Relation Age of Onset    Thyroid Disease Mother     Macular Degen Mother     Arthritis Mother     High Blood Pressure Father     Heart Attack Father     Arthritis Father     Heart Disease Father     Cancer Father     No Known Problems Brother     Other Brother         viral pneumonia at age 25s    No Known Problems Son      No Known Allergies  Current Outpatient Medications   Medication Sig Dispense Refill    varenicline (CHANTIX) 1 MG tablet Take 1 tablet by mouth 2 times daily 180 tablet 1    varenicline (CHANTIX) 0.5 MG tablet Take 1-2 tablets by mouth See Admin Instructions 0.5mg DAILY for 3 days followed by 0.5mg TWICE DAILY for 4 days followed by 1mg TWICE DAILY 57 tablet 0    omeprazole (PRILOSEC) 20 MG delayed release capsule Take one po QOD and alternate with 2 po  capsule 1    metoprolol succinate (TOPROL XL) 100 MG extended release tablet One tablet daily 90 tablet 4    atorvastatin (LIPITOR) 20 MG tablet TAKE 1 TABLET BY MOUTH ONE TIME A DAY 30 tablet 5    zoster recombinant adjuvanted vaccine (SHINGRIX) 50 MCG/0.5ML SUSR injection Inject 0.5 mLs into the muscle See Admin Instructions 1 dose now and repeat in 2-6 months 0.5 mL 0    Ascorbic Acid ER 1000 MG TBCR Take 1 tablet by mouth 2 times daily      VITAMIN D PO Cholecalciferol (Vitamin D3) * (VITAMIN D325 MC3) 25 MCG (1,000 UNIT) TABLET Active 25 MCG PO EVERY DAY      Boswellia-Glucosamine-Vit D (OSTEO BI-FLEX ONE PER DAY PO) Glucosamine/D3/Boswellia Ximena (OSTEO BI-FLEX1 EAC2) 1 EACH TABLET Active 2 EACH PO HS      Multiple Vitamin (ONE-A-DAY MENS PO) Multivit,Calc,Min/FA/K1/Lycop (ONE-A-DAY MEN'1 EAC5) 240 MCG-30 MCG-300 MCG TABLET Active 1 EACH PO EVERY DAY      Omega-3 Fatty Acids (FISH OIL) 1000 MG CAPS Omega-3 Fatty Acids * (FISH OIL 1,0001 EAC6) 1,000 MG CAPSULE Active 1000 MG PO EVERY DAY      Coenzyme Q10 (COQ-10 PO) Ubidecarenone (COQ-64375 MG) 100 MG CAPSULE Active 200 MG PO EVERY DAY      acetaminophen (TYLENOL) 500 MG tablet Acetaminophen * ([id number] M1) 500 MG TABLET Active 1000 MG PO EVERY 8 HOURS 42 7 October 4th, 2021 1:37pm      magnesium oxide (MAG-OX) 400 MG tablet Take 1 tablet by mouth daily      melatonin 3 MG TABS tablet Melatonin * (MELATONIN3 MG) 3 MG TABLET Active 3 MG PO AT BEDTIME AS NEEDED as needed for SLEEP      fluticasone (FLONASE) 50 MCG/ACT nasal spray 1 spray by Nasal route daily 1 each 5     No current facility-administered medications for this visit. PMH, Surgical Hx, Family Hx, and Social Hxreviewed and updated. Health Maintenance reviewed. Objective    Vitals:    06/08/22 0942   BP: 126/80   Pulse: 70   Temp: (!) 96.1 °F (35.6 °C)   TempSrc: Temporal   SpO2: 96%   Weight: 210 lb (95.3 kg)   Height: 5' 8\" (1.727 m)        Physical Exam  Constitutional:       Appearance: He is well-developed. HENT:      Head: Normocephalic and atraumatic. Eyes:      Conjunctiva/sclera: Conjunctivae normal.   Pulmonary:      Effort: Pulmonary effort is normal.   Neurological:      Mental Status: He is alert and oriented to person, place, and time. Lab Results   Component Value Date    LABA1C 6.0 (H) 04/21/2022    LABA1C 6.0 (H) 12/01/2021    LABA1C 5.9 09/04/2019     Lab Results   Component Value Date    CREATININE 1.02 12/01/2021     Lab Results   Component Value Date    ALT 49 (H) 12/01/2021    AST 32 12/01/2021     Lab Results   Component Value Date    CHOL 145 12/08/2018    TRIG 53 12/08/2018    HDL 43 04/21/2022    LDLCALC 86 04/21/2022        Assessment & Plan   Visit Diagnoses and Associated Orders     Essential hypertension    -  Primary    Stable and well-controlled on current meds.          PSVT (paroxysmal supraventricular tachycardia) ([address]        Improving, fairly well-controlled. Continue Toprol XL and magnesium; avoid caffeine and stop tobacco         Dizziness        Possibly related to PSVT. Mixed hyperlipidemia        Continue atorvastatin; reviewed healthy diet and exercise         Hyperglycemia        Advised at increased risk of DM. Reviewed diet. Tobacco use disorder        Reviewed proper admin in order to reduce potential SEs.    varenicline (CHANTIX) 1 MG tablet [[id number]]      varenicline (CHANTIX) 0.5 MG tablet [[id number]]                   Reviewed with the patient: all disease processes, current clinical status, medications, activities and diet.      Side effects, adverse effects of the medication prescribed today, as well as treatment plan/ rationale and result expectations have been discussed with the patient who expresses understanding and desires to proceed.     Close follow up to evaluate treatment results and for coordination of care. I have reviewed the patient's medical history in detail and updated the computerized patient record. More than 50% of the appointment was spent in face-to-face counseling, education and care coordination. Orders Placed This Encounter   Medications    varenicline (CHANTIX) 1 MG tablet     Sig: Take 1 tablet by mouth 2 times daily     Dispense:  180 tablet     Refill:  1    varenicline (CHANTIX) 0.5 MG tablet     Sig: Take 1-2 tablets by mouth See Admin Instructions 0.5mg DAILY for 3 days followed by 0.5mg TWICE DAILY for 4 days followed by 1mg TWICE DAILY     Dispense:  57 tablet     Refill:  0     There are no discontinued medications. Return in about 6 months (around 12/8/2022) for HTN, Obesity - OV. Controlled Substance Monitoring:    Acute and Chronic Pain Monitoring:   RX Monitoring 10/16/2020   Attestation -   Periodic Controlled Substance Monitoring Possible medication side effects, risk of tolerance/dependence & alternative treatments discussed. Chronic Pain > 50 MEDD Re-evaluated the status of the patient's underlying condition causing pain.            Shiela Malik MD

## 2022-06-08 NOTE — PATIENT INSTRUCTIONS
DASH Diet: Care Instructions  Your Care Instructions     The DASH diet is an eating plan that can help lower your blood pressure. DASH stands for Dietary Approaches to Stop Hypertension. Hypertension is high bloodpressure. The DASH diet focuses on eating foods that are high in calcium, potassium, and magnesium. These nutrients can lower blood pressure. The foods that are highest in these nutrients are fruits, vegetables, low-fat dairy products, nuts, seeds, and legumes. But taking calcium, potassium, and magnesium supplements instead of eating foods that are high in those nutrients does not have the same effect. The DASH diet also includes whole grains, fish, and poultry. The DASH diet is one of several lifestyle changes your doctor may recommend to lower your high blood pressure. Your doctor may also want you to decrease the amount of sodium in your diet. Lowering sodium while following the DASH dietcan lower blood pressure even further than just the DASH diet alone. Follow-up care is a key part of your treatment and safety. Be sure to make and go to all appointments, and call your doctor if you are having problems. It's also a good idea to know your test results and keep alist of the medicines you take. How can you care for yourself at home? Following the DASH diet   Eat 4 to 5 servings of fruit each day. A serving is 1 medium-sized piece of fruit, ½ cup chopped or canned fruit, 1/4 cup dried fruit, or 4 ounces (½ cup) of fruit juice. Choose fruit more often than fruit juice.  Eat 4 to 5 servings of vegetables each day. A serving is 1 cup of lettuce or raw leafy vegetables, ½ cup of chopped or cooked vegetables, or 4 ounces (½ cup) of vegetable juice. Choose vegetables more often than vegetable juice.  Get 2 to 3 servings of low-fat and fat-free dairy each day. A serving is 8 ounces of milk, 1 cup of yogurt, or 1 ½ ounces of cheese.  Eat 6 to 8 servings of grains each day.  A serving is 1 slice of bread, 1 ounce of dry cereal, or ½ cup of cooked rice, pasta, or cooked cereal. Try to choose whole-grain products as much as possible.  Limit lean meat, poultry, and fish to 2 servings each day. A serving is 3 ounces, about the size of a deck of cards.  Eat 4 to 5 servings of nuts, seeds, and legumes (cooked dried beans, lentils, and split peas) each week. A serving is 1/3 cup of nuts, 2 tablespoons of seeds, or ½ cup of cooked beans or peas.  Limit fats and oils to 2 to 3 servings each day. A serving is 1 teaspoon of vegetable oil or 2 tablespoons of salad dressing.  Limit sweets and added sugars to 5 servings or less a week. A serving is 1 tablespoon jelly or jam, ½ cup sorbet, or 1 cup of lemonade.  Eat less than 2,300 milligrams (mg) of sodium a day. If you limit your sodium to 1,500 mg a day, you can lower your blood pressure even more.  Be aware that all of these are the suggested number of servings for people who eat 1,800 to 2,000 calories a day. Your recommended number of servings may be different if you need more or fewer calories. Tips for success   Start small. Do not try to make dramatic changes to your diet all at once. You might feel that you are missing out on your favorite foods and then be more likely to not follow the plan. Make small changes, and stick with them. Once those changes become habit, add a few more changes.  Try some of the following:  ? Make it a goal to eat a fruit or vegetable at every meal and at snacks. This will make it easy to get the recommended amount of fruits and vegetables each day. ? Try yogurt topped with fruit and nuts for a snack or healthy dessert. ? Add lettuce, tomato, cucumber, and onion to sandwiches. ? Combine a ready-made pizza crust with low-fat mozzarella cheese and lots of vegetable toppings. Try using tomatoes, squash, spinach, broccoli, carrots, cauliflower, and onions. ?  Have a variety of cut-up vegetables with a low-fat dip as an appetizer instead of chips and dip. ? Sprinkle sunflower seeds or chopped almonds over salads. Or try adding chopped walnuts or almonds to cooked vegetables. ? Try some vegetarian meals using beans and peas. Add garbanzo or kidney beans to salads. Make burritos and tacos with mashed correa beans or black beans. Where can you learn more? Go to https://Aztek Networkstrangeweb.Bestimators LLC. org and sign in to your [employer] account. Enter N759 in the [employer] box to learn more about \"DASH Diet: Care Instructions. \"     If you do not have an account, please click on the \"Sign Up Now\" link. Current as of: January 10, 2022               Content Version: 13.2  © 2006-2022 Healthwise, [employer]. Care instructions adapted under license by [facility] ([facility]). If you have questions about a medical condition or this instruction, always ask your healthcare professional. [place] [address] any warranty or liability for your use of this information. Learning About Healthy Weight  What is a healthy weight? A healthy weight is the weight at which you feel good about yourself and have energy for work and play. It's also one that lowers your risk for healthproblems. What can you do to stay at a healthy weight? It can be hard to stay at a healthy weight, especially when fast food, vending-machine snacks, and processed foods are so easy to find. And with your busy lifestyle, activity may be low on your list of things to do. But stayingat a healthy weight may be easier than you think. Here are some dos and don'ts for staying at a healthy weight. Do eat healthy foods  The kinds of foods you eat have a big impact on both your weight and your health. Reaching and staying at a healthy weight is not about going on a diet. It's about making healthier food choices every day and changing your diet forgood. Healthy eating means eating a variety of foods so that you get all the nutrients you need.  Your body needs protein, carbohydrate, and fats for energy. They keep your heart beating, your brain active, and your muscles working. On most days, try to eat from each food group. This means eating a variety of:   Whole grains, such as whole wheat breads and pastas.  Fruits and vegetables.  Dairy products, such as low-fat milk, yogurt, and cheese.  Lean proteins, such as all types of fish, chicken without the skin, and beans. Don't have too much or too little of one thing. All foods, if eaten inmoderation, can be part of healthy eating. Even sweets can be okay. If your favorite foods are high in fat, salt, sugar, or calories, limit howoften you eat them. Eat smaller servings, or look for healthy substitutes. Do watch what you eat  Many people eat more than their bodies need. Part of staying at a healthy weight means learning how much food you really need from day to day and not eating more than that. Even with healthy foods, eating too much can make yougain weight. Having a well-balanced diet means that you eat enough, but not too much, and that your food gives you the nutrients you need to stay healthy. So listen toyour body. Eat when you're hungry. Stop when you feel satisfied. It's a good idea to have healthy snacks ready for when you get hungry. Keep healthy snacks with you at work, in your car, and at home. If you have a healthy snack easily available, you'll be less likely to pick a candy bar orbag of chips from a vending machine instead. Some healthy snacks you might want to keep on hand are fruit, low-fat yogurt, string cheese, low-fat microwave popcorn, raisins and other dried fruit, nuts,whole wheat crackers, pretzels, carrots, celery sticks, and broccoli. Do some physical activity  A big part of reaching and staying at a healthy weight is being active. When you're active, you burn calories. This makes it easier to reach and stay at a healthy weight.  When you're active on a regular basis, your body burns more calories, even when you're at rest. Being active helps you lose fat andbuild lean muscle. Try to be active for at least 1 hour every day. This may sound like a lot, but it's okay to be active in smaller blocks of time that add up to 1 hour a day. Any activity that makes your heart beat faster and keeps it there for a while counts. A brisk walk, run, or swim will get your heart beating faster. So will climbing stairs, shooting baskets, or cycling. Even some household chores likevacuuming and mowing the lawn will get your heart rate up. Pick activities that you enjoy--ones that make your heart beat faster, your muscles stronger, and your muscles and joints more flexible. If you find more than one thing you like doing, do them all. You don't have to do the same thingevery day. Don't diet  Diets don't work. Diets are temporary. Because you give up so much when you diet, you may be hungry and think about food all the time. And after you stop dieting, you also may overeat to make up for what you missed. Most people who diet end up gainingback the pounds they lost--and more. Remember that healthy bodies come in lots of shapes and sizes. Everyone can gethealthier by eating better and being more active. Where can you learn more? Go to https://Countdown To Buycrystal.UYA100. org and sign in to your hhgregg account. Enter 534 [id number] in the KylesAvitide box to learn more about \"Learning About Healthy Weight. \"     If you do not have an account, please click on the \"Sign Up Now\" link. Current as of: December 27, 2021               Content Version: 13.2  © 9558-2561 Healthwise, [employer]. Care instructions adapted under license by [facility] ([facility]). If you have questions about a medical condition or this instruction, always ask your healthcare professional. [address] any warranty or liability for your use of this information.            Eating Healthy Foods: Care Instructions  Your Care Instructions     Eating healthy foods can help lower your risk for disease. Healthy food gives you energy and keeps your heart strong, your brain active, your musclesworking, and your bones strong. A healthy diet includes a variety of foods from the basic food groups: grains, vegetables, fruits, milk and milk products, and meat and beans. Some people may eat more of their favorite foods from only one food group and, as a result, miss getting the nutrients they need. So, it is important to pay attention not only to what you eat but also to what you are missing from your diet. You caneat a healthy, balanced diet by making a few small changes. Follow-up care is a key part of your treatment and safety. Be sure to make and go to all appointments, and call your doctor if you are having problems. It's also a good idea to know your test results and keep alist of the medicines you take. How can you care for yourself at home? Look at what you eat   Keep a food diary for a week or two and record everything you eat or drink. Track the number of servings you eat from each food group.  For a balanced diet every day, eat a variety of:  ? 6 or more ounce-equivalents of grains, such as cereals, breads, crackers, rice, or pasta, every day. An ounce-equivalent is 1 slice of bread, 1 cup of ready-to-eat cereal, or ½ cup of cooked rice, cooked pasta, or cooked cereal.  ? 2½ cups of vegetables, especially:  - Dark-green vegetables such as broccoli and spinach.  - Orange vegetables such as carrots and sweet potatoes. - Dry beans (such as correa and kidney beans) and peas (such as lentils). ? 2 cups of fresh, frozen, or canned fruit. A small apple or 1 banana or orange equals 1 cup. ? 3 cups of nonfat or low-fat milk, yogurt, or other milk products. ? 5½ ounces of meat and beans, such as chicken, fish, lean meat, beans, nuts, and seeds.  One egg, 1 tablespoon of peanut butter, ½ ounce nuts or seeds, or ¼ cup of cooked beans equals 1 ounce of meat.  Learn how to read food labels for serving sizes and ingredients. Fast-food and convenience-food meals often contain few or no fruits or vegetables. Make sure you eat some fruits and vegetables to make the meal more nutritious.  Look at your food diary. For each food group, add up what you have eaten and then divide the total by the number of days. This will give you an idea of how much you are eating from each food group. See if you can find some ways to change your diet to make it more healthy. Start small   Do not try to make dramatic changes to your diet all at once. You might feel that you are missing out on your favorite foods and then be more likely to fail.  Start slowly, and gradually change your habits. Try some of the following:  ? Use whole wheat bread instead of white bread. ? Use nonfat or low-fat milk instead of whole milk. ? Eat brown rice instead of white rice, and eat whole wheat pasta instead of white-flour pasta. ? Try low-fat cheeses and low-fat yogurt. ? Add more fruits and vegetables to meals and have them for snacks. ? Add lettuce, tomato, cucumber, and onion to sandwiches. ? Add fruit to yogurt and cereal.  Enjoy food   You can still eat your favorite foods. You just may need to eat less of them. If your favorite foods are high in fat, salt, and sugar, limit how often you eat them, but do not cut them out entirely.  Eat a wide variety of foods. Make healthy choices when eating out   The type of restaurant you choose can help you make healthy choices. Even fast-food chains are now offering more low-fat or healthier choices on the menu.  Choose smaller portions, or take half of your meal home.  When eating out, try:  ? A veggie pizza with a whole wheat crust or grilled chicken (instead of sausage or pepperoni). ? Pasta with roasted vegetables, grilled chicken, or marinara sauce instead of cream sauce.   ? A vegetable wrap or grilled chicken wrap. ? Broiled or poached food instead of fried or breaded items. Make healthy choices easy   Buy packaged, prewashed, ready-to-eat fresh vegetables and fruits, such as baby carrots, salad mixes, and chopped or shredded broccoli and cauliflower.  Buy packaged, presliced fruits, such as melon or pineapple.  Choose 100% fruit or vegetable juice instead of soda. Limit juice intake to 4 to 6 oz (½ to ¾ cup) a day.  Blend low-fat yogurt, fruit juice, and canned or frozen fruit to make a smoothie for breakfast or a snack. Where can you learn more? Go to https://OnVantagepeAdviesmanager.nleweb.Kranem. org and sign in to your MeinProspekt account. Enter E426 in the RewardMyWay box to learn more about \"Eating Healthy Foods: Care Instructions. \"     If you do not have an account, please click on the \"Sign Up Now\" link. Current as of: September 8, 2021               Content Version: 13.2  © 1153-6626 [employer]. Care instructions adapted under license by [facility] ([facility]). If you have questions about a medical condition or this instruction, always ask your healthcare professional. [place] [address] any warranty or liability for your use of this information. Stopping Smoking: Care Instructions  Your Care Instructions     Cigarette smokers crave the nicotine in cigarettes. Giving it up is much harder than simply changing a habit. Your body has to stop craving the nicotine. It is hard to quit, but you can do it. There are many tools that people use to quitsmoking. You may find that combining tools works best for you. There are several steps to quitting. First you get ready to quit. Then you get support to help you. After that, you learn new skills and behaviors to become anonsmoker. For many people, a necessary step is getting and using medicine. Your doctor will help you set up the plan that best meets your needs.  You may want to attend a smoking cessation program to help you quit smoking. When you choose a program, look for one that has proven success. Ask your doctor for ideas. You will greatly increase your chances of success if you take medicineas well as get counseling or join a cessation program.  Some of the changes you feel when you first quit tobacco are uncomfortable. Your body will miss the nicotine at first, and you may feel short-tempered and grumpy. You may have trouble sleeping or concentrating. Medicine can help you deal with these symptoms. You may struggle with changing your smoking habits and rituals. The last step is the tricky one: Be prepared for the smoking urge to continue for a time. This is a lot to deal with, but keep at it. You willfeel better. Follow-up care is a key part of your treatment and safety. Be sure to make and go to all appointments, and call your doctor if you are having problems. It's also a good idea to know your test results and keep alist of the medicines you take. How can you care for yourself at home?  Ask your family, friends, and coworkers for support. You have a better chance of quitting if you have help and support.  Join a support group, such as Nicotine Anonymous, for people who are trying to quit smoking.  Consider signing up for a smoking cessation program, such as the American Lung Association's Freedom from Smoking program.   Get text messaging support. Go to the website at www.smokefree. gov to sign up for the [facility] program.   Set a quit date. Pick your date carefully so that it is not right in the middle of a big deadline or stressful time. Once you quit, do not even take a puff. Get rid of all ashtrays and lighters after your last cigarette. Clean your house and your clothes so that they do not smell of smoke.  Learn how to be a nonsmoker. Think about ways you can avoid those things that make you reach for a cigarette. ? Avoid situations that put you at greatest risk for smoking.  For some people, it is hard to have a drink with friends without smoking. For others, they might skip a coffee break with coworkers who smoke. ? Change your daily routine. Take a different route to work or eat a meal in a different place.  Cut down on stress. Calm yourself or release tension by doing an activity you enjoy, such as reading a book, taking a hot bath, or gardening.  Talk to your doctor or pharmacist about nicotine replacement therapy, which replaces the nicotine in your body. You still get nicotine but you do not use tobacco. Nicotine replacement products help you slowly reduce the amount of nicotine you need. These products come in several forms, many of them available over-the-counter:  ? Nicotine patches  ? Nicotine gum and lozenges  ? Nicotine inhaler   Ask your doctor about bupropion (Wellbutrin) or varenicline (Chantix), which are prescription medicines. They do not contain nicotine. They help you by reducing withdrawal symptoms, such as stress and anxiety.  Some people find hypnosis, acupuncture, and massage helpful for ending the smoking habit.  Eat a healthy diet and get regular exercise. Having healthy habits will help your body move past its craving for nicotine.  Be prepared to keep trying. Most people are not successful the first few times they try to quit. Do not get mad at yourself if you smoke again. Make a list of things you learned and think about when you want to try again, such as next week, next month, or next year. Where can you learn more? Go to https://EnerG2vivianeTribotek.[employer]Wise [employer]. org and sign in to your Mover account. Enter H191 in the [facility] box to learn more about \"Stopping Smoking: Care Instructions. \"     If you do not have an account, please click on the \"Sign Up Now\" link. Current as of: October 28, 2021               Content Version: 13.2  © 4602-9485 Healthwise, SignStorey. Care instructions adapted under license by [facility] ([facility]).  If you have questions about a medical condition or this instruction, always ask your healthcare professional. [address] any warranty or liability for your use of this information. Learning About Benefits From Quitting Smoking  How does quitting smoking make you healthier? If you're thinking about quitting smoking, you may have a few reasons to besmoke-free. Your health may be one of them.  When you quit smoking, you lower your risks for cancer, lung disease, heart attack, stroke, blood vessel disease, and blindness from macular degeneration.  When you're smoke-free, you get sick less often, and you heal faster. You are less likely to get colds, flu, bronchitis, and pneumonia.  As a nonsmoker, you may find that your mood is better and you are less stressed. When and how will you feel healthier? Quitting has real health benefits that start from day 1 of being smoke-free. And the longer you stay smoke-free, the healthier you get and the better youfeel. The first hours   After just 20 minutes, your blood pressure and heart rate go down. That means there's less stress on your heart and blood vessels.  Within 12 hours, the level of carbon monoxide in your blood drops back to normal. That makes room for more oxygen. With more oxygen in your body, you may notice that you have more energy than when you smoked. After 2 weeks   Your lungs start to work better.  Your risk of heart attack starts to drop. After 1 month   When your lungs are clear, you cough less and breathe deeper, so it's easier to be active.  Your sense of taste and smell return. That means you can enjoy food more than you have since you started smoking. Over the years   Over the years, your risks of heart disease, heart attack, and stroke are lower.  After 10 years, your risk of dying from lung cancer is cut by about half. And your risk for many other types of cancer is lower too.   How would quitting help others in your life? When you quit smoking, you improve the health of everyone who now breathes inyour smoke.  Their heart, lung, and cancer risks drop, much like yours.  They are sick less. For babies and small children, living smoke-free means they're less likely to have ear infections, pneumonia, and bronchitis.  If you're a woman who is or will be pregnant someday, quitting smoking means a healthier .  Children who are close to you are less likely to become adult smokers. Where can you learn more? Go to https://Enmotuspepiceweb.Postdeck. org and sign in to your Embotics account. Enter [id number] in the idiag box to learn more about \"Learning About Benefits From Quitting Smoking. \"     If you do not have an account, please click on the \"Sign Up Now\" link. Current as of: 2021               Content Version: 13.2  © 0471-1926 [email or web address]. Care instructions adapted under license by [facility] ([facility]). If you have questions about a medical condition or this instruction, always ask your healthcare professional. [place] [address] any warranty or liability for your use of this information. Personalized Preventive Plan for James Radish - 2022  Medicare offers a range of preventive health benefits. Some of the tests and screenings are paid in full while other may be subject to a deductible, co-insurance, and/or copay. Some of these benefits include a comprehensive review of your medical history including lifestyle, illnesses that may run in your family, and various assessments and screenings as appropriate. After reviewing your medical record and screening and assessments performed today your provider may have ordered immunizations, labs, imaging, and/or referrals for you. A list of these orders (if applicable) as well as your Preventive Care list are included within your After Visit Summary for your review.     Other Preventive Recommendations:    · A preventive eye exam performed by an eye specialist is recommended every 1-2 years to screen for glaucoma; cataracts, macular degeneration, and other eye disorders. · A preventive dental visit is recommended every 6 months. · Try to get at least 150 minutes of exercise per week or 10,000 steps per day on a pedometer . · Order or download the FREE \"Exercise & Physical Activity: Your Everyday Guide\" from [employer]. Call 6-264.948.3933 or search [employer] online. · You need 9039-7830 mg of calcium and 3752-2372 IU of vitamin D per day. It is possible to meet your calcium requirement with diet alone, but a vitamin D supplement is usually necessary to meet this goal.  · When exposed to the sun, use a sunscreen that protects against both UVA and UVB radiation with an SPF of 30 or greater. Reapply every 2 to 3 hours or after sweating, drying off with a towel, or swimming. · Always wear a seat belt when traveling in a car. Always wear a helmet when riding a bicycle or motorcycle. Heart-Healthy Diet   Sodium, Fat, and Cholesterol Controlled Diet       What Is a Heart Healthy Diet? A heart-healthy diet is one that limits sodium , certain types of fat , and cholesterol . This type of diet is recommended for:   People with any form of cardiovascular disease (eg, coronary heart disease , peripheral vascular disease , previous heart attack , previous stroke )   People with risk factors for cardiovascular disease, such as high blood pressure , high cholesterol , or diabetes   Anyone who wants to lower their risk of developing cardiovascular disease   Sodium    Sodium is a mineral found in many foods. In general, most people consume much more sodium than they need. Diets high in sodium can increase blood pressure and lead to edema (water retention).  On a heart-healthy diet, you should consume no more than 2,300 mg (milligrams) of sodium per dayabout the amount in one teaspoon of table salt. The foods highest in sodium include table salt (about 50% sodium), processed foods, convenience foods, and preserved foods. Cholesterol    Cholesterol is a fat-like, waxy substance in your blood. Our bodies make some cholesterol. It is also found in animal products, with the highest amounts in fatty meat, egg yolks, whole milk, cheese, shellfish, and organ meats. On a heart-healthy diet, you should limit your cholesterol intake to less than 200 mg per day. It is normal and important to have some cholesterol in your bloodstream. But too much cholesterol can cause plaque to build up within your arteries, which can eventually lead to a heart attack or stroke. The two types of cholesterol that are most commonly referred to are:   Low-density lipoprotein (LDL) cholesterol  Also known as bad cholesterol, this is the cholesterol that tends to build up along your arteries. Bad cholesterol levels are increased by eating fats that are saturated or hydrogenated. Optimal level of this cholesterol is less than 100. Over 130 starts to get risky for heart disease. High-density lipoprotein (HDL) cholesterol  Also known as good cholesterol, this type of cholesterol actually carries cholesterol away from your arteries and may, therefore, help lower your risk of having a heart attack. You want this level to be high (ideally greater than 60). It is a risk to have a level less than 40. You can raise this good cholesterol by eating olive oil, canola oil, avocados, or nuts. Exercise raises this level, too. Fat    Fat is calorie dense and packs a lot of calories into a small amount of food. Even though fats should be limited due to their high calorie content, not all fats are bad. In fact, some fats are quite healthful. Fat can be broken down into four main types.    The good-for-you fats are:   Monounsaturated fat  found in oils such as olive and canola, avocados, and nuts and natural nut butters; can decrease cholesterol levels, while keeping levels of HDL cholesterol high   Polyunsaturated fat  found in oils such as safflower, sunflower, soybean, corn, and sesame; can decrease total cholesterol and LDL cholesterol   Omega-3 fatty acids  particularly those found in fatty fish (such as salmon, trout, tuna, mackerel, herring, and sardines); can decrease risk of arrhythmias, decrease triglyceride levels, and slightly lower blood pressure   The fats that you want to limit are:   Saturated fat  found in animal products, many fast foods, and a few vegetables; increases total blood cholesterol, including LDL levels   Animal fats that are saturated include: butter, lard, whole-milk dairy products, meat fat, and poultry skin   Vegetable fats that are saturated include: hydrogenated shortening, palm oil, coconut oil, cocoa butter   Hydrogenated or trans fat  found in margarine and vegetable shortening, most shelf stable snack foods, and fried foods; increases LDL and decreases HDL     It is generally recommended that you limit your total fat for the day to less than 30% of your total calories. If you follow an 1800-calorie heart healthy diet, for example, this would mean 60 grams of fat or less per day. Saturated fat and trans fat in your diet raises your blood cholesterol the most, much more than dietary cholesterol does. For this reason, on a heart-healthy diet, less than 7% of your calories should come from saturated fat and ideally 0% from trans fat. On an 1800-calorie diet, this translates into less than 14 grams of saturated fat per day, leaving 46 grams of fat to come from mono- and polyunsaturated fats.    Food Choices on a Heart Healthy Diet   Food Category   Foods Recommended   Foods to Avoid   Grains   Breads and rolls without salted tops Most dry and cooked cereals Unsalted crackers and breadsticks Low-sodium or homemade breadcrumbs or stuffing All rice and pastas   Breads, rolls, and crackers with salted tops High-fat baked goods (eg, muffins, donuts, pastries) Quick breads, self-rising flour, and biscuit mixes Regular bread crumbs Instant hot cereals Commercially prepared rice, pasta, or stuffing mixes   Vegetables   Most fresh, frozen, and low-sodium canned vegetables Low-sodium and salt-free vegetable juices Canned vegetables if unsalted or rinsed   Regular canned vegetables and juices, including sauerkraut and pickled vegetables Frozen vegetables with sauces Commercially prepared potato and vegetable mixes   Fruits   Most fresh, frozen, and canned fruits All fruit juices   Fruits processed with salt or sodium   Milk   Nonfat or low-fat (1%) milk Nonfat or low-fat yogurt Cottage cheese, low-fat ricotta, cheeses labeled as low-fat and low-sodium   Whole milk Reduced-fat (2%) milk Malted and chocolate milk Full fat yogurt Most cheeses (unless low-fat and low salt) Buttermilk (no more than 1 cup per week)   Meats and Beans   Lean cuts of fresh or frozen beef, veal, lamb, or pork (look for the word loin) Fresh or frozen poultry without the skin Fresh or frozen fish and some shellfish Egg whites and egg substitutes (Limit whole eggs to three per week) Tofu Nuts or seeds (unsalted, dry-roasted), low-sodium peanut butter Dried peas, beans, and lentils   Any smoked, cured, salted, or canned meat, fish, or poultry (including ralph, chipped beef, cold cuts, hot dogs, sausages, sardines, and anchovies) Poultry skins Breaded and/or fried fish or meats Canned peas, beans, and lentils Salted nuts   Fats and Oils   Olive oil and canola oil Low-sodium, low-fat salad dressings and mayonnaise   Butter, margarine, coconut and palm oils, ralph fat   Snacks, Sweets, and Condiments   Low-sodium or unsalted versions of broths, soups, soy sauce, and condiments Pepper, herbs, and spices; vinegar, lemon, or lime juice Low-fat frozen desserts (yogurt, sherbet, fruit bars) Sugar, cocoa powder, honey, syrup, jam, and preserves Low-fat, trans-fat free cookies, cakes, and pies Tae and animal crackers, fig bars, naomi snaps   High-fat desserts Broth, soups, gravies, and sauces, made from instant mixes or other high-sodium ingredients Salted snack foods Canned olives Meat tenderizers, seasoning salt, and most flavored vinegars   Beverages   Low-sodium carbonated beverages Tea and coffee in moderation Soy milk   Commercially softened water   Suggestions   Make whole grains, fruits, and vegetables the base of your diet. Choose heart-healthy fats such as canola, olive, and flaxseed oil, and foods high in heart-healthy fats, such as nuts, seeds, soybeans, tofu, and fish. Eat fish at least twice per week; the fish highest in omega-3 fatty acids and lowest in mercury include salmon, herring, mackerel, sardines, and canned chunk light tuna. If you eat fish less than twice per week or have high triglycerides, talk to your doctor about taking fish oil supplements. Read food labels. For products low in fat and cholesterol, look for fat free, low-fat, cholesterol free, saturated fat free, and trans fat freeAlso scan the Nutrition Facts Label, which lists saturated fat, trans fat, and cholesterol amounts. For products low in sodium, look for sodium free, very low sodium, low sodium, no added salt, and unsalted   Skip the salt when cooking or at the table; if food needs more flavor, get creative and try out different herbs and spices. Garlic and onion also add substantial flavor to foods. Trim any visible fat off meat and poultry before cooking, and drain the fat off after silva. Use cooking methods that require little or no added fat, such as grilling, boiling, baking, poaching, broiling, roasting, steaming, stir-frying, and sauting. Avoid fast food and convenience food. They tend to be high in saturated and trans fat and have a lot of added salt. Talk to a registered dietitian for individualized diet advice.       Last Reviewed: March 2011 Keyshawn Velarde MS, MPH, RD   Updated: 3/29/2011   ·     High-Fiber Diet     What Is Fiber? Dietary fiber is a form of carbohydrate found in plants that cannot be digested by humans. All plants contain fiber, including fruits, vegetables, grains, and legumes. Fiber is often classified into two categories: soluble and insoluble. Soluble fiber draws water into the bowel and can help slow digestion. Examples of foods that are high in soluble fiber include oatmeal, oat bran, barley, legumes (eg, beans and peas), apples, and strawberries. Insoluble fiber speeds digestion and can add bulk to the stool. Examples of foods that are high in insoluble fiber include whole-wheat products, wheat bran, cauliflower, green beans, and potatoes. Why Follow a High-Fiber Diet? A high-fiber diet is often recommended to prevent and treat constipation , hemorrhoids , diverticulitis , and irritable bowel syndrome . Eating a high-fiber diet can also help improve your cholesterol levels, lower your risk of coronary heart disease , reduce your risk of type 2 diabetes , and lower your weight. For people with type 1 or 2 diabetes, a high-fiber diet can also help stabilize blood sugar levels. How Much Fiber Should I Eat? A high-fiber diet should contain  20-35 grams  of fiber a day. This is actually the amount recommended for the general adult population; however, most Americans eat only 15 grams of fiber per day. Digestion of Fiber   Eating a higher fiber diet than usual can take some getting used to by your body's digestive system. To avoid the side effects of sudden increases in dietary fiber (eg, gas, cramping, bloating, and diarrhea), increase fiber gradually and be sure to drink plenty of fluids every day. Tips for Increasing Fiber Intake   Whenever possible, choose whole grains over refined grains (eg, brown rice instead of white rice, whole-wheat bread instead of white bread).     Include a variety of grains in your diet, such as wheat, rye, barley, oats, quinoa, and bulgur. Eat more vegetarian-based meals. Here are some ideas: black bean burgers, eggplant lasagna, and veggie tofu stir-woods. Choose high-fiber snacks, such as fruits, popcorn, whole-grain crackers, and nuts. Make whole-grain cereal or whole-grain toast part of your daily breakfast regime. When eating out, whether ordering a sandwich or dinner, ask for extra vegetables. When baking, replace part of the white flour with whole-wheat flour. Whole-wheat flour is particularly easy to incorporate into a recipe. High-Fiber Diet Eating Guide   Food Category   Foods Recommended   Notes   Grains   Whole-grain breads, muffins, bagels, or candido bread Rye bread Whole-wheat crackers or crisp breads Whole-grain or bran cereals Oatmeal, oat bran, or grits Wheat germ Whole-wheat pasta and brown rice   Read the ingredients list on food labels. Look for products that list \"whole\" as the first ingredient (eg, whole-wheat, whole oats). Choose cereals with at least 2 grams of fiber per serving. Vegetables   All vegetables, especially asparagus, bean sprouts, broccoli, [place] sprouts, cabbage, carrots, cauliflower, celery, corn, greens, green beans, green pepper, onions, peas, potatoes (with skin), snow peas, spinach, squash, sweet potatoes, tomatoes, zucchini   For maximum fiber intake, eat the peels of fruits and vegetablesjust be sure to wash them well first.   Fruits   All fruits, especially apples, berries, grapefruits, mangoes, nectarines, oranges, peaches, pears, dried fruits (figs, dates, prunes, raisins)   Choose raw fruits and vegetables over juice, cooked, or cannedraw fruit has more fiber. Dried fruit is also a good source of fiber. Milk   With the exception of yogurt containing inulin (a type of fiber), dairy foods provide little fiber.    Add more fiber by topping your yogurt or cottage cheese with fresh fruit, whole grain or bran cereals, nuts, or seeds. Meats and Beans   All beans and peas, especially Garbanzo beans, kidney beans, lentils, lima beans, split peas, and correa beans All nuts and seeds, especially almonds, peanuts, [place] nuts, cashews, peanut butter, walnuts, sesame and sunflower seeds All meat, poultry, fish, and eggs   Increase fiber in meat dishes by adding correa beans, kidney beans, black-eyed peas, bran, or oatmeal. If you are following a low-fat diet, use nuts and seeds only in moderation. Fats and Oils   All in moderation   Fats and oils do not provide fiber   Snacks, Sweets, and Condiments   Fruit Nuts Popcorn, whole-wheat pretzels, or trail mix made with dried fruits, nuts, and seeds Cakes, breads, and cookies made with oatmeal or whole-wheat flour   Most snack foods do not provide much fiber. Choose snacks with at least 2 grams of fiber per serving. Last Reviewed: March 2011 Mary Robin MS, MPH, RD   Updated: 3/29/2011   ·     Keep Your Memory [place]       Many factors can affect your ability to remembera hectic lifestyle, aging, stress, chronic disease, and certain medicines. But, there are steps you can take to sharpen your mind and help preserve your memory. Challenge Your Brain   Regularly challenging your mind may help keeps it in top shape. Good mental exercises include:   Crossword puzzlesUse a dictionary if you need it; you will learn more that way. Brainteasers Try some! Crafts, such as wood working and sewing   Hobbies, such as gardening and building model airplanes   SocializingVisit old friends or join groups to meet new ones.    Reading   Learning a new language   Taking a class, whether it be art history or monique chi   TravelingExperience the food, history, and culture of your destination   Learning to use a computer   Going to museums, the theater, or thought-provoking movies   Changing things in your daily life, such as reversing your pattern in the grocery store or brushing your teeth using your nondominant hand   Use Memory Aids   There is no need to remember every detail on your own. These memory aids can help:   Calendars and day planners   Electronic organizers to store all sorts of helpful informationThese devices can \"beep\" to remind you of appointments. A book of days to record birthdays, anniversaries, and other occasions that occur on the same date every year   Detailed \"to-do\" lists and strategically placed sticky notes   Quick \"study\" sessionsBefore a gathering, review who will be there so their names will be fresh in your mind. Establish routinesFor example, keep your keys, wallet, and umbrella in the same place all the time or take medicine with your 8:00 AM glass of juice   Live a Healthy Life   Many actions that will keep your body strong will do the same for your mind. For example:   Talk to Your Doctor About Herbs and Supplements    Malnutrition and vitamin deficiencies can impair your mental function. For example, vitamin B12 deficiency can cause a range of symptoms, including confusion. But, what if your nutritional needs are being met? Can herbs and supplements still offer a benefit? Researchers have investigated a range of natural remedies, such as ginkgo , ginseng , and the supplement phosphatidylserine (PS). So far, though, the evidence is inconsistent as to whether these products can improve memory or thinking. If you are interested in taking herbs and supplements, talk to your doctor first because they may interact with other medicines that you are taking. Exercise Regularly    Among the many benefits of regular exercise are increased blood flow to the brain and decreased risk of certain diseases that can interfere with memory function. One study found that even moderate exercise has a beneficial effect.  Examples of \"moderate\" exercise include:   Playing 18 holes of golf once a week, without a cart   Playing tennis twice a week   Walking one mile per day   Manage Stress It can be tough to remember what is important when your mind is cluttered. Make time for relaxation. Choose activities that calm you down, and make it routine. Manage Chronic Conditions    Side effects of high blood pressure , diabetes, and heart disease can interfere with mental function. Many of the lifestyle steps discussed here can help manage these conditions. Strive to eat a healthy diet, exercise regularly, get stress under control, and follow your doctor's advice for your condition. Minimize Medications    Talk to your doctor about the medicines that you take. Some may be unnecessary. Also, healthy lifestyle habits may lower the need for certain drugs. Last Reviewed: April 2010 Jaun Onofre MD   Updated: 4/13/2010   ·     [address]       As we get older, changes in balance, gait, strength, vision, hearing, and cognition make even the most youthful senior more prone to accidents. Falls are one of the leading health risks for older people. This increased risk of falling is related to:   Aging process (eg, decreased muscle strength, slowed reflexes)   Higher incidence of chronic health problems (eg, arthritis, diabetes) that may limit mobility, agility or sensory awareness   Side effects of medicine (eg, dizziness, blurred vision)especially medicines like prescription pain medicines and drugs used to treat mental health conditions   Depending on the brittleness of your bones, the consequences of a fall can be serious and long lasting. Home Life   Research by the Association of Aging [facility]) shows that some home accidents among older adults can be prevented by making simple lifestyle changes and basic modifications and repairs to the home environment. Here are some lifestyle changes that experts recommend:   Have your hearing and vision checked regularly. Be sure to wear prescription glasses that are right for you.    Speak to your doctor or pharmacist about the possible side effects of your medicines. A number of medicines can cause dizziness. If you have problems with sleep, talk to your doctor. Limit your intake of alcohol. If necessary, use a cane or walker to help maintain your balance. Wear supportive, rubber-soled shoes, even at home. If you live in a region that gets wintry weather, you may want to put special cleats on your shoes to prevent you from slipping on the snow and ice. Exercise regularly to help maintain muscle tone, agility, and balance. Always hold the banister when going up or down stairs. Also, use  bars when getting in or out of the bath or shower, or using the toilet. To avoid dizziness, get up slowly from a lying down position. Sit up first, dangling your legs for a minute or two before rising to a standing position. Overall Home Safety Check   According to the Consumer Product Safety Commision's \"Older Consumer Home Safety Checklist,\" it is important to check for potential hazards in each room. And remember, proper lighting is an essential factor in home safety. If you cannot see clearly, you are more likely to fall. Important questions to ask yourself include:   Are lamp, electric, extension, and telephone cords placed out of the flow of traffic and maintained in good condition? Have frayed cords been replaced? Are all small rugs and runners slip resistant? If not, you can secure them to the floor with a special double-sided carpet tape. Are smoke detectors properly locatedone on every floor of your home and one outside of every sleeping area? Are they in good working order? Are batteries replaced at least once a year? Do you have a well-maintained carbon monoxide detector outside every sleeping are in your home? Does your furniture layout leave plenty of space to maneuver between and around chairs, tables, beds, and sofas? Are hallways, stairs and passages between rooms well lit? Can you reach a lamp without getting out of bed?    Are floor surfaces well maintained? Shag rugs, high-pile carpeting, tile floors, and polished wood floors can be particularly slippery. Stairs should always have handrails and be carpeted or fitted with a non-skid tread. Is your telephone easily reachable. Is the cord safely tucked away? Room by Room   According to the Association of Aging, bathrooms and ro are the two most potentially hazardous rooms in your home. In the Kitchen    Be sure your stove is in proper working order and always make sure burners and the oven are off before you go out or go to sleep. Keep pots on the back burners, turn handles away from the front of the stove, and keep stove clean and free of grease build-up. Kitchen ventilation systems and range exhausts should be working properly. Keep flammable objects such as towels and pot holders away from the cooking area except when in use. Make sure kitchen curtains are tied back. Move cords and appliances away from the sink and hot surfaces. If extension cords are needed, install wiring guides so they do not hang over the sink, range, or working areas. Look for coffee pots, kettles and toaster ovens with automatic shut-offs. Keep a mop handy in the kitchen so you can wipe up spills instantly. You should also have a small fire extinguisher. Arrange your kitchen with frequently used items on lower shelves to avoid the need to stand on a stepstool to reach them. Make sure countertops are well-lit to avoid injuries while cutting and preparing food. In the Bathroom    Use a non-slip mat or decals in the tub and shower, since wet, soapy tile or porcelain surfaces are extremely slippery. Make sure bathroom rugs are non-skid or tape them firmly to the floor. Bathtubs should have at least one, preferably two, grab bars, firmly attached to structural supports in the wall.  (Do not use built-in soap holders or glass shower doors as grab bars.)    Tub seats fitted with non-slip material on the legs allow you to wash sitting down. For people with limited mobility, bathtub transfer benches allow you to slide safely into the tub. Raised toilet seats and toilet safety rails are helpful for those with knee or hip problems. In the [facility]    Make sure you use a nightlight and that the area around your bed is clear of potential obstacles. Be careful with electric blankets and never go to sleep with a heating pad, which can cause serious burns even if on a low setting. Use fire-resistant mattress covers and pillows, and NEVER smoke in bed. Keep a phone next to the bed that is programmed to dial 911 at the push of a button. If you have a chronic condition, you may want to sign on with an automatic call-in service. Typically the system includes a small pendant that connects directly to an emergency medical voice-response system. You should also make arrangements to stay in contact with someonefriend, neighbor, family memberon a regular schedule. Fire Prevention   According to the [place]. (Smoke Alarms for Every) [address] [place] [address], senior citizens are one of the two highest risk groups for death and serious injuries due to residential fires. When cooking, wear short-sleeved items, never a bulky long-sleeved robe. The [facility]'s Safety Checklist for Older Consumers emphasizes the importance of checking basements, garages, workshops and storage areas for fire hazards, such as volatile liquids, piles of old rags or clothing and overloaded circuits. Never smoke in bed or when lying down on a couch or recliner chair. Small portable electric or kerosene heaters are responsible for many home fires and should be used cautiously if at all. If you do use one, be sure to keep them away from flammable materials. In case of fire, make sure you have a pre-established emergency exit plan.     Have a professional check your fireplace and other fuel-burning appliances yearly. Hel[employer]   Baby boomers entering the ferguson years will continue to see the development of new products to help older adults live safely and independently in spite of age-related changes. Making Life More Livable  , by Rose Arriaga, lists over 1,000 products for \"living well in the mature years,\" such as bathing and mobility aids, household security devices, ergonomically designed knives and peelers, and faucet valves and knobs for temperature control. Medical supply stores and organizations are good sources of information about products that improve your quality of life and insure your safety.      Last Reviewed: November 2009 Vladimir Pope MD   Updated: 3/7/2011     ·

## 2022-06-08 NOTE — PROGRESS NOTES
Medicare Annual Wellness Visit    Bubba Guillory is here for Medicare AWV    Assessment & Plan   Welcome to Medicare preventive visit  Personal history of tobacco use, presenting hazards to health  -     UT TOBACCO USE CESSATION INTERMEDIATE 3-10 MINUTES [82751]      Recommendations for Preventive Services Due: see orders and patient instructions/AVS.  Recommended screening schedule for the next 5-10 years is provided to the patient in written form: see Patient Instructions/AVS.     Return for Medicare Annual Wellness Visit in 1 year. Subjective       Patient's complete Health Risk Assessment and screening values have been reviewed and are found in Flowsheets. The following problems were reviewed today and where indicated follow up appointments were made and/or referrals ordered.     Positive Risk Factor Screenings with Interventions:         Tobacco Use:     Tobacco Use: High Risk    Smoking Tobacco Use: Current Every Day Smoker    Smokeless Tobacco Use: Never Used     E-Cigarettes/Vaping Use     Questions Responses    E-Cigarette/Vaping Use Never User    Start Date     Passive Exposure     Quit Date     Counseling Given     Comments         Substance Use - Tobacco Interventions:  tobacco cessation tips and resources provided, prescribed varenicline           General Health and ACP:  General  In general, how would you say your health is?: Very Good  In the past 7 days, have you experienced any of the following: New or Increased Pain, New or Increased Fatigue, Loneliness, Social Isolation, Stress or Anger?: (!) Yes  Select all that apply: (!) New or Increased Pain  Do you get the social and emotional support that you need?: Yes  Do you have a Living Will?: (!) No    Advance Directives     Power of  Living Will ACP-Advance Directive ACP-Power of     Not on File Not on File Filed Not on File      General Health Risk Interventions:  · Pain issues: will address incrementally    Health Habits/Nutrition:     Physical Activity: Insufficiently Active    Days of Exercise per Week: 3 days    Minutes of Exercise per Session: 30 min     Have you lost any weight without trying in the past 3 months?: No     Have you seen the dentist within the past year?: Yes    Health Habits/Nutrition Interventions:  · reviewed diet and exercise and risk of DM    Hearing/Vision:  Do you or your family notice any trouble with your hearing that hasn't been managed with hearing aids?: No  Do you have difficulty driving, watching TV, or doing any of your daily activities because of your eyesight?: No  Have you had an eye exam within the past year?: (!) No  No exam data present    Hearing/Vision Interventions:  · Vision concerns:  patient encouraged to make appointment with his/her eye specialist            Objective   There were no vitals filed for this visit. There is no height or weight on file to calculate BMI. No Known Allergies  Prior to Visit Medications    Medication Sig Taking?  Authorizing Provider   varenicline (CHANTIX) 1 MG tablet Take 1 tablet by mouth 2 times daily  Mela Kerr MD   varenicline (CHANTIX) 0.5 MG tablet Take 1-2 tablets by mouth See Admin Instructions 0.5mg DAILY for 3 days followed by 0.5mg TWICE DAILY for 4 days followed by 1mg TWICE DAILY  Mela Kerr MD   omeprazole (PRILOSEC) 20 MG delayed release capsule Take one po QOD and alternate with 2 po QOD  Mela Kerr MD   metoprolol succinate (TOPROL XL) 100 MG extended release tablet One tablet daily  Mela Kerr MD   atorvastatin (LIPITOR) 20 MG tablet TAKE 1 TABLET BY MOUTH ONE TIME A DAY  Mela Kerr MD   zoster recombinant adjuvanted vaccine [facility]) 50 MCG/0.5ML SUSR injection Inject 0.5 mLs into the muscle See Admin Instructions 1 dose now and repeat in 2-6 months  Mela Kerr MD   Ascorbic Acid ER 1000 MG TBCR Take 1 tablet by mouth 2 times daily  Historical Provider, MD   VITAMIN D PO Cholecalciferol (Vitamin D3) * (VITAMIN D325 MC3) 25 MCG (1,000 UNIT) TABLET Active 25 MCG PO EVERY DAY  Historical Provider, MD   Boswellia-Glucosamine-Vit D (OSTEO BI-FLEX ONE PER DAY PO) Glucosamine/D3/Boswellia Ximena (OSTEO BI-FLEX1 EAC2) 1 EACH TABLET Active 2 EACH PO HS  Historical Provider, MD   Multiple Vitamin (ONE-A-DAY MENS PO) Multivit,Calc,Min/FA/K1/Lycop (ONE-A-DAY MEN'1 EAC5) 240 MCG-30 MCG-300 MCG TABLET Active 1 EACH PO EVERY DAY  Historical Provider, MD   Omega-3 Fatty Acids (FISH OIL) 1000 MG CAPS Omega-3 Fatty Acids * (FISH OIL 1,0001 EAC6) 1,000 MG CAPSULE Active 1000 MG PO EVERY DAY  Historical Provider, MD   Coenzyme Q10 (COQ-10 PO) Ubidecarenone (COQ-34187 MG) 100 MG CAPSULE Active 200 MG PO EVERY DAY  Historical Provider, MD   acetaminophen (TYLENOL) 500 MG tablet Acetaminophen * ([id number] M1) 500 MG TABLET Active 1000 MG PO EVERY 8 HOURS 42 7 October 4th, 2021 1:37pm  Historical Provider, MD   magnesium oxide (MAG-OX) 400 MG tablet Take 1 tablet by mouth daily  Historical Provider, MD   melatonin 3 MG TABS tablet Melatonin * (MELATONIN3 MG) 3 MG TABLET Active 3 MG PO AT BEDTIME AS NEEDED as needed for SLEEP  Historical Provider, MD   fluticasone (FLONASE) 50 MCG/ACT nasal spray 1 spray by Nasal route daily  Rosey Abreu PA-C       [facility] (Including outside providers/suppliers regularly involved in providing care):   Patient Care Team:  Rosey Abreu PA-C as PCP - General (Physician Assistant)  Kimberly Arias PA-C as PCP - [facility] Empaneled Provider  Giuseppe Espinal MD (General Surgery)     Reviewed and updated this visit:  Tobacco  Allergies  Meds  Problems  Med Hx  Surg Hx  Soc Hx  Fam Hx                  Cardiovascular Disease Risk Counseling: Assessed the patient's risk to develop cardiovascular disease and reviewed main risk factors.    Reviewed steps to reduce disease risk including:   · Quitting tobacco use, reducing amount smoked, or not starting the habit  · Making healthy food choices  · Being physically active and gradualy increasing activity levels   · Reduce weight and determine a healthy BMI goal  · Monitor blood pressure and treat if higher than 140/90 mmHg  ·   Provided a follow up plan. Time spent (minutes): 3  Obesity Counseling: Assessed behavioral health risks and factors affecting choice of behavior. Suggested weight control approaches, including dietary changes behavioral modification and follow up plan. Provided educational and support documentation. Time spent (minutes): 3  Tobacco Cessation Counseling: Patient advised about behavior change, including information about personal health harms, usage of appropriate cessation measures and benefits of cessation.   Time spent (minutes): 3

## 2022-06-08 NOTE — PATIENT INSTRUCTIONS
On a 9 inch plate half should be fresh vegetables. If not fresh then frozen. One quarter of the plate should be protein like pork, chicken, fish, eggs. In one quarter of food plates should be healthy carbohydrates like fresh blueberries, blackberries, raspberries, strawberries. If not berries, then whole grains like brown rice or quinoa. Avoid foods that are white or light brown in color other than cauliflower, eggs, cheese. Avoid sweetened drinks. Avoid all juices. Avoid highly processed and fried foods.     Dr. Braulio Shaw favorite Low Carb Meal:    1/2 cup sweet potato hash browns fried in coconut oil  A bed of baby spinach, kale or arabella  1 cup seasoned ground beef or pork or sliced steak or chicken  Top with chopped, fresh peppers, onions, mushrooms, zucchini, etc  Add a fried egg  Add avocado if you like  Just don't eat any carbs when you eat this meal

## 2022-07-11 ENCOUNTER — OFFICE VISIT (OUTPATIENT)
Dept: FAMILY MEDICINE CLINIC | Age: 67
End: 2022-07-11
Payer: MEDICARE

## 2022-07-11 VITALS
DIASTOLIC BLOOD PRESSURE: 88 MMHG | HEIGHT: 70 IN | SYSTOLIC BLOOD PRESSURE: 120 MMHG | RESPIRATION RATE: 16 BRPM | WEIGHT: 210 LBS | OXYGEN SATURATION: 96 % | BODY MASS INDEX: 30.06 KG/M2 | HEART RATE: 75 BPM

## 2022-07-11 DIAGNOSIS — S61.031A PUNCTURE WOUND OF RIGHT THUMB, INITIAL ENCOUNTER: Primary | ICD-10-CM

## 2022-07-11 PROCEDURE — 99213 OFFICE O/P EST LOW 20 MIN: CPT | Performed by: PHYSICIAN ASSISTANT

## 2022-07-11 PROCEDURE — 1123F ACP DISCUSS/DSCN MKR DOCD: CPT | Performed by: PHYSICIAN ASSISTANT

## 2022-07-11 ASSESSMENT — ENCOUNTER SYMPTOMS
NAUSEA: 0
SINUS PAIN: 0
CHEST TIGHTNESS: 0
SHORTNESS OF BREATH: 0
ABDOMINAL PAIN: 0
DIARRHEA: 0
VOMITING: 0
SORE THROAT: 0
COUGH: 0
BACK PAIN: 0
SINUS PRESSURE: 0

## 2022-07-11 ASSESSMENT — VISUAL ACUITY: OU: 1

## 2022-07-11 NOTE — PROGRESS NOTES
[address] Encounter  CHIEF COMPLAINT       Chief Complaint   Patient presents with    Puncture Wound     wound in right thumb is not sure when he had last tetanus shot       HISTORY OF PRESENT ILLNESS   Renette Lennox is a 74 y.o. male who presents with:  CC of right thumb puncture wound after removing nail from board that happened x2 hours ago. Patient reports he cleaned the area. Wanted to check if abx or tetanus was necessary. Patients last Tdap was in 2019. Patient denies any bleeding, numbness or tingling. REVIEW OF SYSTEMS     Review of Systems   Constitutional: Negative for activity change, appetite change, chills and fever. HENT: Negative for congestion, drooling, sinus pressure, sinus pain and sore throat. Eyes: Negative for visual disturbance. Respiratory: Negative for cough, chest tightness and shortness of breath. Cardiovascular: Negative for chest pain. Gastrointestinal: Negative for abdominal pain, diarrhea, nausea and vomiting. Endocrine: Negative for cold intolerance. Genitourinary: Negative for dysuria, flank pain, frequency and hematuria. Musculoskeletal: Negative for arthralgias and back pain. Skin: Positive for wound. Negative for rash. Allergic/Immunologic: Negative for food allergies. Neurological: Negative for weakness, light-headedness, numbness and headaches. Hematological: Does not bruise/bleed easily.      PAST MEDICAL HISTORY         Diagnosis Date    Anemia     CHF (congestive heart failure) ([facility]     Patient denies hx    Depression     Essential hypertension     Fatty liver     GERD (gastroesophageal reflux disease)     Hepatitis C antibody test positive 4/21/2022    Hepatitis C without hepatic coma     Hepatitis C without hepatic coma     Hyperlipidemia     meds > 5 yrs    Hypertension     meds > 5 yrs    Insomnia     Laryngitis from reflux of stomach acid 8/4/2015    Osteoarthritis     Osteoarthritis of spine with radiculopathy, lumbar region 1/19/2016    Prostate cancer ([facility] 2019    OR -- no chemo or radiation    Prostate cancer ([facility]     PSVT (paroxysmal supraventricular tachycardia) ([facility] 6/7/2022    Tobacco use disorder 6/8/2022     SURGICAL HISTORY     Patient  has a past surgical history that includes Knee arthroscopy (Bilateral, 2014); Rotator cuff repair (Right, 2006); Carpal tunnel release (Bilateral, 2005); Total knee arthroplasty (Right, 11-3-14); Colonoscopy (3-2014-PROBABLY 08 not14); lumbar laminectomy (2/24/16); pr total knee arthroplasty (Left, 6/18/2018); Endoscopy, colon, diagnostic; Ultrasound Prostate/Transrectal (N/A, 3/20/2019); Colonoscopy (N/A, 8/9/2019); Spine surgery; Carpal tunnel release; knee surgery (Left); shoulder surgery; Prostatectomy (06/2020); joint replacement (2012); joint replacement (2018); joint replacement; Tonsillectomy (1964); back surgery (2005); back surgery (2016); lumbar fusion (N/A, 9/28/2020); hip surgery (Right, 9/10/2021); and Rotator cuff repair (Right, 10/04/2021).   CURRENT MEDICATIONS       Previous Medications    ACETAMINOPHEN (TYLENOL) 500 MG TABLET    Acetaminophen * ([id number] M1) 500 MG TABLET Active 1000 MG PO EVERY 8 HOURS 42 7 October 4th, 2021 1:37pm    ASCORBIC ACID ER 1000 MG TBCR    Take 1 tablet by mouth 2 times daily    ATORVASTATIN (LIPITOR) 20 MG TABLET    TAKE 1 TABLET BY MOUTH ONE TIME A DAY    BOSWELLIA-GLUCOSAMINE-VIT D (OSTEO BI-FLEX ONE PER DAY PO)    Glucosamine/D3/Boswellia Ximena (OSTEO BI-FLEX1 EAC2) 1 EACH TABLET Active 2 EACH PO HS    COENZYME Q10 (COQ-10 PO)    Ubidecarenone (COQ-33138 MG) 100 MG CAPSULE Active 200 MG PO EVERY DAY    FLUTICASONE (FLONASE) 50 MCG/ACT NASAL SPRAY    1 spray by Nasal route daily    MAGNESIUM OXIDE (MAG-OX) 400 MG TABLET    Take 1 tablet by mouth daily    MELATONIN 3 MG TABS TABLET    Melatonin * (MELATONIN3 MG) 3 MG TABLET Active 3 MG PO AT BEDTIME AS NEEDED as needed for SLEEP METOPROLOL SUCCINATE (TOPROL XL) 100 MG EXTENDED RELEASE TABLET    One tablet daily    MULTIPLE VITAMIN (ONE-A-DAY MENS PO)    Multivit,Calc,Min/FA/K1/Lycop (ONE-A-DAY MEN'1 EAC5) 240 MCG-30 MCG-300 MCG TABLET Active 1 EACH PO EVERY DAY    OMEGA-3 FATTY ACIDS (FISH OIL) 1000 MG CAPS    Omega-3 Fatty Acids * (FISH OIL 1,0001 EAC6) 1,000 MG CAPSULE Active 1000 MG PO EVERY DAY    OMEPRAZOLE (PRILOSEC) 20 MG DELAYED RELEASE CAPSULE    Take one po QOD and alternate with 2 po QOD    VARENICLINE (CHANTIX) 0.5 MG TABLET    Take 1-2 tablets by mouth See Admin Instructions 0.5mg DAILY for 3 days followed by 0.5mg TWICE DAILY for 4 days followed by 1mg TWICE DAILY    VARENICLINE (CHANTIX) 1 MG TABLET    Take 1 tablet by mouth 2 times daily    VITAMIN D PO    Cholecalciferol (Vitamin D3) * (VITAMIN D325 MC3) 25 MCG (1,000 UNIT) TABLET Active 25 MCG PO EVERY DAY    ZOSTER RECOMBINANT ADJUVANTED VACCINE (SHINGRIX) 50 MCG/0.5ML SUSR INJECTION    Inject 0.5 mLs into the muscle See Admin Instructions 1 dose now and repeat in 2-6 months     ALLERGIES     Patient is has No Known Allergies. FAMILY HISTORY     Patient'sfamily history includes Arthritis in his father and mother; Cancer in his father; Heart Attack in his father; Heart Disease in his father; High Blood Pressure in his father; Eladio Devries in his mother; No Known Problems in his brother and son; Other in his brother; Thyroid Disease in his mother. SOCIAL HISTORY     Patient  reports that he has been smoking cigarettes and cigars. He started smoking about 51 years ago. He has a 13.86 pack-year smoking history. He has never used smokeless tobacco. He reports that he does not drink alcohol and does not use drugs. PHYSICAL EXAM     VITALS  BP: 120/88,  , Heart Rate: 75, Resp: 16, SpO2: 96 %  Physical Exam  Vitals and nursing note reviewed. Constitutional:       General: He is awake. He is not in acute distress. Appearance: Normal appearance. He is well-developed.  He understanding. On this date 7/11/2022 I have spent 20 minutes reviewing previous notes, test results and face to face with the patient discussing the diagnosis and importance of compliance with the treatment plan as well as documenting on the day of the visit. PATIENT REFERRED TO:  Return if symptoms worsen or fail to improve. DISCHARGE MEDICATIONS:  New Prescriptions    No medications on file     Cannot display discharge medications since this is not an admission.        Pranav Bueno

## 2022-08-16 ENCOUNTER — TELEPHONE (OUTPATIENT)
Dept: GASTROENTEROLOGY | Age: 67
End: 2022-08-16

## 2022-08-16 DIAGNOSIS — Z12.11 SCREEN FOR COLON CANCER: Primary | ICD-10-CM

## 2022-08-16 NOTE — TELEPHONE ENCOUNTER
Spoke to patient, agreeable to colonoscopy. Patient scheduled 1/12/2023 at 11Am. Miralax Prep mailed to patient. Referral pended to PCP. Info faxed to Maximo Welsh.

## 2022-10-16 DIAGNOSIS — K21.9 GASTROESOPHAGEAL REFLUX DISEASE, UNSPECIFIED WHETHER ESOPHAGITIS PRESENT: ICD-10-CM

## 2022-10-17 RX ORDER — OMEPRAZOLE 20 MG/1
CAPSULE, DELAYED RELEASE ORAL
Qty: 130 CAPSULE | Refills: 0 | Status: SHIPPED | OUTPATIENT
Start: 2022-10-17

## 2022-11-30 ENCOUNTER — TELEPHONE (OUTPATIENT)
Dept: PHARMACY | Facility: CLINIC | Age: 67
End: 2022-11-30

## 2022-11-30 NOTE — TELEPHONE ENCOUNTER
[facility] CLINICAL PHARMACY: ADHERENCE REVIEW  Identified care gap per [place]: fills at [facility]: Statin adherence    Last Visit: 6/8/22 (Dr. Shania Parikh), 7/11/22 (MOUNIKA Cox)      [address] Records claims through 11/21/22; YTD Al Benson =  83%; Potential Fail Date: 12/7/22 ):   ATORVASTATIN 20 MG TABLET due to refill 11/15/22 for 30 day supply. Per [facility]:   ATORVASTATIN 20 MG TABLET last picked up on 10/19/22 for 30 day supply. 0 refills remaining. Billed through [facility] Results   Component Value Date    CHOL 145 12/08/2018    TRIG 53 12/08/2018    HDL 43 04/21/2022    LDLCALC 86 04/21/2022     ALT   Date Value Ref Range Status   12/01/2021 49 (H) 0 - 41 U/L Final     AST   Date Value Ref Range Status   12/01/2021 32 0 - 40 U/L Final     The 10-year ASCVD risk score (Aamir SINGH, et al., 2019) is: 13.8%    Values used to calculate the score:      Age: 74 years      Sex: Male      Is Non- : No      Diabetic: No      Tobacco smoker: Yes      Systolic Blood Pressure: 871 mmHg      Is BP treated: No      HDL Cholesterol: 43 mg/dL      Total Cholesterol: 145 mg/dL     PLAN  The following are interventions that have been identified:   - Patient overdue refilling Atorvastatin and active on home medication list.   - Patient needs refills for Atorvastatin    Reached patient to review. Patient stated he had a few tablets left of Atorvastatin. Patient has upcoming appointment with provider Chepe Cox on 12/5/22 and prefers to ask provider himself at that time to send in new prescription for Atorvastatin. Thankful for reminder.          Future Appointments   Date Time Provider Cas Arnold   12/5/2022  4:[facility]   6/6/2023  8:30 AM Donny Bryan DO [address] Clinical Pharmacy   Direct: [phone]  Phone: toll free [phone]       For [address] [place] [address] in place:  No  Recommendation Provided To: Patient/Caregiver: 1 via Telephone  Intervention Detail: Adherence Monitorin  Gap Closed?: Yes   Intervention Accepted By: Patient/Caregiver: 1  Time Spent (min): 15

## 2022-12-05 ENCOUNTER — OFFICE VISIT (OUTPATIENT)
Dept: FAMILY MEDICINE CLINIC | Age: 67
End: 2022-12-05
Payer: MEDICARE

## 2022-12-05 VITALS
HEART RATE: 70 BPM | DIASTOLIC BLOOD PRESSURE: 80 MMHG | BODY MASS INDEX: 29.06 KG/M2 | RESPIRATION RATE: 16 BRPM | OXYGEN SATURATION: 97 % | TEMPERATURE: 97.2 F | SYSTOLIC BLOOD PRESSURE: 122 MMHG | WEIGHT: 203 LBS | HEIGHT: 70 IN

## 2022-12-05 DIAGNOSIS — K21.9 GASTROESOPHAGEAL REFLUX DISEASE, UNSPECIFIED WHETHER ESOPHAGITIS PRESENT: ICD-10-CM

## 2022-12-05 DIAGNOSIS — I10 ESSENTIAL HYPERTENSION: ICD-10-CM

## 2022-12-05 DIAGNOSIS — F17.200 TOBACCO USE DISORDER: Chronic | ICD-10-CM

## 2022-12-05 DIAGNOSIS — E78.2 MIXED HYPERLIPIDEMIA: ICD-10-CM

## 2022-12-05 DIAGNOSIS — I47.1 PSVT (PAROXYSMAL SUPRAVENTRICULAR TACHYCARDIA) (HCC): Primary | ICD-10-CM

## 2022-12-05 PROCEDURE — 3074F SYST BP LT 130 MM HG: CPT | Performed by: PHYSICIAN ASSISTANT

## 2022-12-05 PROCEDURE — 1123F ACP DISCUSS/DSCN MKR DOCD: CPT | Performed by: PHYSICIAN ASSISTANT

## 2022-12-05 PROCEDURE — 99213 OFFICE O/P EST LOW 20 MIN: CPT | Performed by: PHYSICIAN ASSISTANT

## 2022-12-05 PROCEDURE — 3078F DIAST BP <80 MM HG: CPT | Performed by: PHYSICIAN ASSISTANT

## 2022-12-05 RX ORDER — ATORVASTATIN CALCIUM 20 MG/1
TABLET, FILM COATED ORAL
Qty: 30 TABLET | Refills: 5 | Status: SHIPPED | OUTPATIENT
Start: 2022-12-05

## 2022-12-05 RX ORDER — METOPROLOL SUCCINATE 100 MG/1
TABLET, EXTENDED RELEASE ORAL
Qty: 90 TABLET | Refills: 4 | Status: SHIPPED | OUTPATIENT
Start: 2022-12-05

## 2022-12-05 ASSESSMENT — VISUAL ACUITY: OU: 1

## 2022-12-05 ASSESSMENT — ENCOUNTER SYMPTOMS
NAUSEA: 0
SINUS PRESSURE: 0
ABDOMINAL PAIN: 0
BACK PAIN: 0
DIARRHEA: 0
SORE THROAT: 0
SINUS PAIN: 0
CHEST TIGHTNESS: 0
VOMITING: 0
COUGH: 0
SHORTNESS OF BREATH: 0

## 2022-12-05 NOTE — PROGRESS NOTES
Hung Laws 1955 is a 74 y.o. male who presents today with:  Chief Complaint   Patient presents with    Establish Care     Patient is just establishing        HPI    Establishing care. No concerns. HTN/SVT  Patient is here for f/u HTN. Is compliant with meds and has no side effects from them. Avoids added salt. Tries to eat healthy. Exercises occasionally. Has no chest pain, shortness of breath, palpitations or edema. Patient denies palpitations or tachycardia. HLD  Patient is here for hyperlipidemia. Is compliant with medications and has no apparent side effects from them. GERD  Well-controlled on prn PPI, with some caffeine restriction, diet restriction. No weight loss. No abdominal pain. No bloody or black tarry stools. Tobacco abuse  Smoking a 1/3 or a pack a day. Stopped chantix. Patient's next quit date is January 14th. Review of Systems   Constitutional:  Negative for activity change, appetite change, chills and fever. HENT:  Negative for congestion, drooling, sinus pressure, sinus pain and sore throat. Eyes:  Negative for visual disturbance. Respiratory:  Negative for cough, chest tightness and shortness of breath. Cardiovascular:  Negative for chest pain. Gastrointestinal:  Negative for abdominal pain, diarrhea, nausea and vomiting. Endocrine: Negative for cold intolerance. Genitourinary:  Negative for dysuria, flank pain, frequency and hematuria. Musculoskeletal:  Negative for arthralgias and back pain. Skin:  Negative for rash. Allergic/Immunologic: Negative for food allergies. Neurological:  Negative for weakness, light-headedness, numbness and headaches. Hematological:  Does not bruise/bleed easily.      Past Medical History:   Diagnosis Date    Anemia     CHF (congestive heart failure) ([facility] [address] [facility]     Patient denies hx    Depression     Essential hypertension     Fatty liver     GERD (gastroesophageal reflux disease)     Hepatitis C antibody test positive 4/21/2022    Hepatitis C without hepatic coma     Hepatitis C without hepatic coma     Hyperlipidemia     meds > 5 yrs    Hypertension     meds > 5 yrs    Insomnia     Laryngitis from reflux of stomach acid 8/4/2015    Osteoarthritis     Osteoarthritis of spine with radiculopathy, lumbar region 1/19/2016    Prostate cancer ([facility] [address] 2019    OR -- no chemo or radiation    Prostate cancer ([facility] [address]     PSVT (paroxysmal supraventricular tachycardia) ([facility] [address] 6/7/2022    Tobacco use disorder 6/8/2022     Past Surgical History:   Procedure Laterality Date    BACK SURGERY  2005    lumbar disc OR / fusion    BACK SURGERY  2016    fusion    CARPAL TUNNEL RELEASE Bilateral 2005    CARPAL TUNNEL RELEASE      COLONOSCOPY  3-2014-PROBABLY 08 not14    (-), 2008 POLYPS    COLONOSCOPY N/A 8/9/2019    COLORECTAL CANCER SCREENING, NOT HIGH RISK performed by José Luis Hester MD at [address] [facility], COLON, DIAGNOSTIC      HIP SURGERY Right 9/10/2021    RIGHT HIP JOINT INJECTION W/XRAY. CORTISONE INJECTION   LOCAL. 1:00PM performed by Yasmine Benavides MD at [address] [place] [address]  2012    [address] REPLACEMENT  2018    LTKR    JOINT REPLACEMENT      KNEE ARTHROSCOPY Bilateral 2014    KNEE SURGERY Left     before LTKR    LUMBAR FUSION N/A 9/28/2020    REMOVAL OF HARDWARE AT L3-4 L2,L3,L4 INSTRUMENTATION L2-3 PLIF L3-4 POSTEROLATERAL FUSION performed by Sil Wang MD at [address]  2/24/16    DR. Li 78     DC TOTAL KNEE ARTHROPLASTY Left 6/18/2018    LEFT KNEE TOTAL KNEE  ARTHROPLASTY, SUPINE, BLOCK PER TKA PROTOCOL, performed by Isabel Isaacs MD at [address]  06/2020    [facility] Right 2006    ROTATOR CUFF REPAIR Right 10/04/2021    SHOULDER SURGERY      SPINE SURGERY      TONSILLECTOMY  1964    TOTAL KNEE ARTHROPLASTY Right 11-3-14    ULTRASOUND PROSTATE/TRANSRECTAL N/A 3/20/2019    PROSTATE TRANSRECTAL ULTRASOUND BIOPSY performed by Thor Cardoza Aisha Vera MD at [address] [facility] History     Socioeconomic History    Marital status:      Spouse name: Not on file    Number of children: Not on file    Years of education: Not on file    Highest education level: Not on file   Occupational History    Not on file   Tobacco Use    Smoking status: Every Day     Packs/day: 0.33     Years: 42.00     Pack years: 13.86     Types: Cigarettes, Cigars     Start date:      Last attempt to quit: 3/1/2020     Years since quittin.7    Smokeless tobacco: Never    Tobacco comments:     Smokes 5 cigars a week   Vaping Use    Vaping Use: Never used   Substance and Sexual Activity    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    Drug use: No    Sexual activity: Yes     Partners: Female   Other Topics Concern    Not on file   Social History Narrative    Not on file     Social Determinants of Health     Financial Resource Strain: Not on file   Food Insecurity: Not on file   Transportation Needs: Not on file   Physical Activity: Insufficiently Active    Days of Exercise per Week: 3 days    Minutes of Exercise per Session: 30 min   Stress: Not on file   Social Connections: Not on file   Intimate Partner Violence: Not on file   Housing Stability: Not on file     Family History   Problem Relation Age of Onset    Thyroid Disease Mother     Macular Degen Mother     Arthritis Mother     High Blood Pressure Father     Heart Attack Father     Arthritis Father     Heart Disease Father     Cancer Father     No Known Problems Brother     Other Brother         viral pneumonia at age 25s    No Known Problems Son      No Known Allergies  Current Outpatient Medications   Medication Sig Dispense Refill    atorvastatin (LIPITOR) 20 MG tablet TAKE 1 TABLET BY MOUTH ONE TIME A DAY 30 tablet 5    metoprolol succinate (TOPROL XL) 100 MG extended release tablet One tablet daily 90 tablet 4    omeprazole (PRILOSEC) 20 MG delayed release capsule ALTERNATE TAKING ONE AND TWO CAPSULES BY MOUTH EVERY OTHER DAY. NO MORE REFILLS UNTIL A FOLLOW UP APPOINTMENT IS SCHEDULED 130 capsule 0    Ascorbic Acid ER 1000 MG TBCR Take 1 tablet by mouth 2 times daily      VITAMIN D PO Cholecalciferol (Vitamin D3) * (VITAMIN D325 MC3) 25 MCG (1,000 UNIT) TABLET Active 25 MCG PO EVERY DAY      Boswellia-Glucosamine-Vit D (OSTEO BI-FLEX ONE PER DAY PO) Glucosamine/D3/Boswellia Ximena (OSTEO BI-FLEX1 EAC2) 1 EACH TABLET Active 2 EACH PO HS      Multiple Vitamin (ONE-A-DAY MENS PO) Multivit,Calc,Min/FA/K1/Lycop (ONE-A-DAY MEN'1 EAC5) 240 MCG-30 MCG-300 MCG TABLET Active 1 EACH PO EVERY DAY      Omega-3 Fatty Acids (FISH OIL) 1000 MG CAPS Omega-3 Fatty Acids * (FISH OIL 1,0001 EAC6) 1,000 MG CAPSULE Active 1000 MG PO EVERY DAY      Coenzyme Q10 (COQ-10 PO) Ubidecarenone (COQ-63801 MG) 100 MG CAPSULE Active 200 MG PO EVERY DAY      acetaminophen (TYLENOL) 500 MG tablet Acetaminophen * ([id number] M1) 500 MG TABLET Active 1000 MG PO EVERY 8 HOURS 42 7 October 4th, 2021 1:37pm      magnesium oxide (MAG-OX) 400 MG tablet Take 1 tablet by mouth daily      melatonin 3 MG TABS tablet Melatonin * (MELATONIN3 MG) 3 MG TABLET Active 3 MG PO AT BEDTIME AS NEEDED as needed for SLEEP      fluticasone (FLONASE) 50 MCG/ACT nasal spray 1 spray by Nasal route daily 1 each 5    varenicline (CHANTIX) 1 MG tablet Take 1 tablet by mouth 2 times daily (Patient not taking: Reported on 12/5/2022) 180 tablet 1    varenicline (CHANTIX) 0.5 MG tablet Take 1-2 tablets by mouth See Admin Instructions 0.5mg DAILY for 3 days followed by 0.5mg TWICE DAILY for 4 days followed by 1mg TWICE DAILY (Patient not taking: Reported on 12/5/2022) 57 tablet 0     No current facility-administered medications for this visit. PMH, Surgical Hx, Family Hx, and Social Hx reviewed and updated. Health Maintenance reviewed.     Objective  Vitals:    12/05/22 1528   BP: 122/80   Site: Right Upper Arm   Position: Sitting   Cuff Size: Large Adult   Pulse: 70   Resp: 16 Temp: 97.2 °F (36.2 °C)   TempSrc: Temporal   SpO2: 97%   Weight: 203 lb (92.1 kg)   Height: 5' 10\" (1.778 m)     BP Readings from Last 3 Encounters:   12/05/22 122/80   07/11/22 120/88   06/08/22 126/80     Wt Readings from Last 3 Encounters:   12/05/22 203 lb (92.1 kg)   07/11/22 210 lb (95.3 kg)   06/08/22 210 lb (95.3 kg)       Lab Results   Component Value Date    LABA1C 6.0 (H) 04/21/2022    LABA1C 6.0 (H) 12/01/2021    LABA1C 5.9 09/04/2019     Lab Results   Component Value Date    CREATININE 1.02 12/01/2021     Lab Results   Component Value Date    ALT 49 (H) 12/01/2021    AST 32 12/01/2021     Lab Results   Component Value Date    CHOL 145 12/08/2018    TRIG 53 12/08/2018    HDL 43 04/21/2022    LDLCALC 86 04/21/2022          Physical Exam  Vitals and nursing note reviewed. Constitutional:       General: He is awake. He is not in acute distress. Appearance: Normal appearance. He is well-developed. He is not ill-appearing, toxic-appearing or diaphoretic. HENT:      Head: Normocephalic and atraumatic. Right Ear: Hearing and external ear normal.      Left Ear: Hearing and external ear normal.      Nose: Nose normal.   Eyes:      General: Lids are normal. Vision grossly intact. Gaze aligned appropriately. Conjunctiva/sclera: Conjunctivae normal.      Pupils: Pupils are equal, round, and reactive to light. Cardiovascular:      Rate and Rhythm: Normal rate and regular rhythm. Pulses: Normal pulses. Heart sounds: Normal heart sounds, S1 normal and S2 normal.   Pulmonary:      Effort: Pulmonary effort is normal.      Breath sounds: Normal breath sounds and air entry. Musculoskeletal:      Cervical back: Normal range of motion. Skin:     General: Skin is warm. Capillary Refill: Capillary refill takes less than 2 seconds. Neurological:      Mental Status: He is alert and oriented to person, place, and time. Gait: Gait is intact.    Psychiatric:         Attention and Perception: Attention normal.         Mood and Affect: Mood normal.         Speech: Speech normal.         Behavior: Behavior normal. Behavior is cooperative. Assessment & Plan   1. PSVT (paroxysmal supraventricular tachycardia) ([address]  - Well controlled with metoprolol. 2. Mixed hyperlipidemia  Comments:  follow labs  Orders:  -     atorvastatin (LIPITOR) 20 MG tablet; TAKE 1 TABLET BY MOUTH ONE TIME A DAY, Disp-30 tablet, R-5Normal  3. Essential hypertension  Comments:  well-controlled with metoprolol  Orders:  -     metoprolol succinate (TOPROL XL) 100 MG extended release tablet; One tablet daily, Disp-90 tablet, R-4Normal  4. Gastroesophageal reflux disease, unspecified whether esophagitis present  - well-controlled. 5. Tobacco use disorder   - patient next quit date on 02/14/23  No orders of the defined types were placed in this encounter. Orders Placed This Encounter   Medications    atorvastatin (LIPITOR) 20 MG tablet     Sig: TAKE 1 TABLET BY MOUTH ONE TIME A DAY     Dispense:  30 tablet     Refill:  5    metoprolol succinate (TOPROL XL) 100 MG extended release tablet     Sig: One tablet daily     Dispense:  90 tablet     Refill:  4     Medications Discontinued During This Encounter   Medication Reason    metoprolol succinate (TOPROL XL) 100 MG extended release tablet REORDER    atorvastatin (LIPITOR) 20 MG tablet REORDER     Return in about 27 weeks (around 6/12/2023) for well adult and awv. Reviewed with the patient: current clinical status, medications, activities and diet. Side effects, adverse effects of the medication prescribed today, as well as treatment plan/ rationale and result expectations have been discussed with the patient who expresses understanding and desires to proceed. Close follow up to evaluate treatment results and for coordination of care. I have reviewed the patient's medical history in detail and updated the computerized patient record.     Marce Fairchild,

## 2023-01-30 ENCOUNTER — ANESTHESIA (OUTPATIENT)
Dept: ENDOSCOPY | Age: 68
End: 2023-01-30
Payer: MEDICARE

## 2023-01-30 ENCOUNTER — HOSPITAL ENCOUNTER (OUTPATIENT)
Age: 68
Setting detail: OUTPATIENT SURGERY
Discharge: HOME | End: 2023-01-30
Attending: SPECIALIST | Admitting: SPECIALIST
Payer: MEDICARE

## 2023-01-30 ENCOUNTER — ANESTHESIA EVENT (OUTPATIENT)
Dept: ENDOSCOPY | Age: 68
End: 2023-01-30
Payer: MEDICARE

## 2023-01-30 VITALS
HEIGHT: 70 IN | SYSTOLIC BLOOD PRESSURE: 126 MMHG | OXYGEN SATURATION: 96 % | WEIGHT: 203 LBS | RESPIRATION RATE: 16 BRPM | TEMPERATURE: 97.4 F | BODY MASS INDEX: 29.06 KG/M2 | HEART RATE: 76 BPM | DIASTOLIC BLOOD PRESSURE: 79 MMHG

## 2023-01-30 DIAGNOSIS — Z12.11 COLON CANCER SCREENING: ICD-10-CM

## 2023-01-30 PROCEDURE — 88305 TISSUE EXAM BY PATHOLOGIST: CPT

## 2023-01-30 PROCEDURE — 3700000001 HC ADD 15 MINUTES (ANESTHESIA): Performed by: SPECIALIST

## 2023-01-30 PROCEDURE — 2580000003 HC RX 258

## 2023-01-30 PROCEDURE — 3609027000 HC COLONOSCOPY: Performed by: SPECIALIST

## 2023-01-30 PROCEDURE — 6370000000 HC RX 637 (ALT 250 FOR IP): Performed by: SPECIALIST

## 2023-01-30 PROCEDURE — 3700000000 HC ANESTHESIA ATTENDED CARE: Performed by: SPECIALIST

## 2023-01-30 PROCEDURE — 7100000011 HC PHASE II RECOVERY - ADDTL 15 MIN: Performed by: SPECIALIST

## 2023-01-30 PROCEDURE — 6360000002 HC RX W HCPCS: Performed by: NURSE ANESTHETIST, CERTIFIED REGISTERED

## 2023-01-30 PROCEDURE — 45380 COLONOSCOPY AND BIOPSY: CPT | Performed by: SPECIALIST

## 2023-01-30 PROCEDURE — 7100000010 HC PHASE II RECOVERY - FIRST 15 MIN: Performed by: SPECIALIST

## 2023-01-30 PROCEDURE — 2709999900 HC NON-CHARGEABLE SUPPLY: Performed by: SPECIALIST

## 2023-01-30 PROCEDURE — 2580000003 HC RX 258: Performed by: SPECIALIST

## 2023-01-30 RX ORDER — SIMETHICONE 20 MG/.3ML
EMULSION ORAL PRN
Status: DISCONTINUED | OUTPATIENT
Start: 2023-01-30 | End: 2023-01-30 | Stop reason: ALTCHOICE

## 2023-01-30 RX ORDER — SODIUM CHLORIDE 9 MG/ML
INJECTION, SOLUTION INTRAVENOUS CONTINUOUS
Status: DISCONTINUED | OUTPATIENT
Start: 2023-01-30 | End: 2023-01-30 | Stop reason: HOSPADM

## 2023-01-30 RX ORDER — MAGNESIUM HYDROXIDE 1200 MG/15ML
LIQUID ORAL PRN
Status: DISCONTINUED | OUTPATIENT
Start: 2023-01-30 | End: 2023-01-30 | Stop reason: ALTCHOICE

## 2023-01-30 RX ORDER — PROPOFOL 10 MG/ML
INJECTION, EMULSION INTRAVENOUS PRN
Status: DISCONTINUED | OUTPATIENT
Start: 2023-01-30 | End: 2023-01-30 | Stop reason: SDUPTHER

## 2023-01-30 RX ORDER — SODIUM CHLORIDE 9 MG/ML
INJECTION, SOLUTION INTRAVENOUS
Status: COMPLETED
Start: 2023-01-30 | End: 2023-01-30

## 2023-01-30 RX ADMIN — PROPOFOL 50 MG: 10 INJECTION, EMULSION INTRAVENOUS at 08:26

## 2023-01-30 RX ADMIN — SODIUM CHLORIDE: 9 INJECTION, SOLUTION INTRAVENOUS at 07:51

## 2023-01-30 RX ADMIN — PROPOFOL 50 MG: 10 INJECTION, EMULSION INTRAVENOUS at 08:30

## 2023-01-30 RX ADMIN — PROPOFOL 50 MG: 10 INJECTION, EMULSION INTRAVENOUS at 08:20

## 2023-01-30 RX ADMIN — PROPOFOL 50 MG: 10 INJECTION, EMULSION INTRAVENOUS at 08:17

## 2023-01-30 RX ADMIN — PROPOFOL 50 MG: 10 INJECTION, EMULSION INTRAVENOUS at 08:22

## 2023-01-30 ASSESSMENT — PAIN - FUNCTIONAL ASSESSMENT: PAIN_FUNCTIONAL_ASSESSMENT: 0-10

## 2023-01-30 NOTE — H&P
Patient Name: Berna Iverson  : 1955  MRN: [id number]  DATE: 23      ENDOSCOPY  History and Physical    Procedure:    [x] Diagnostic Colonoscopy       [] Screening Colonoscopy  [] EGD      [] ERCP      [] EUS       [] Other    [x] Previous office notes/History and Physical reviewed from the patients chart. Please see EMR for further details of HPI. I have examined the patient's status immediately prior to the procedure and:      Indications/HPI:    []Abdominal Pain  []Cancer- GI/Lung  []Fhx of colon CA/polyps  []History of Polyps  []Galans   []Melena  []Abnormal Imaging  []Dysphagia    []Persistent Pneumonia  []Anemia  []Food Impaction  [x]History of Polyps  []GI Bleed  []Pulmonary nodule/Mass  []Change in bowel habits []Heartburn/Reflux  []Rectal Bleed (BRBPR)  []Chest Pain - Non Cardiac []Heme (+) Stoo  l[]Ulcers  []Constipation  []Hemoptysis   []Varices  []Diarrhea  []Hypoxemia  []Nausea/Vomiting  []Screening   []Crohns/Colitis  []Other:     Anesthesia:   [x] MAC [] Moderate Sedation   [] General   [] None     ROS: 12 pt Review of Symptoms was negative unless mentioned above    Medications:   Prior to Admission medications    Medication Sig Start Date End Date Taking? Authorizing Provider   atorvastatin (LIPITOR) 20 MG tablet TAKE 1 TABLET BY MOUTH ONE TIME A DAY 22   MOUNIKA Wallace   metoprolol succinate (TOPROL XL) 100 MG extended release tablet One tablet daily 22   MOUNIKA Wallace   omeprazole (PRILOSEC) 20 MG delayed release capsule ALTERNATE TAKING ONE AND TWO CAPSULES BY MOUTH EVERY OTHER DAY.  NO MORE REFILLS UNTIL A FOLLOW UP APPOINTMENT IS SCHEDULED 10/17/22   MOUNIKA Wallace   varenicline (CHANTIX) 1 MG tablet Take 1 tablet by mouth 2 times daily  Patient not taking: Reported on 2022   Collin Qureshi MD   varenicline (CHANTIX) 0.5 MG tablet Take 1-2 tablets by mouth See Admin Instructions 0.5mg DAILY for 3 days followed by 0.5mg TWICE DAILY for 4 days followed by 1mg TWICE DAILY  Patient not taking: Reported on 12/5/2022 6/8/22   Doretha Napier MD   Ascorbic Acid ER 1000 MG TBCR Take 1 tablet by mouth 2 times daily    Historical Provider, MD   VITAMIN D PO Cholecalciferol (Vitamin D3) * (VITAMIN D325 MC3) 25 MCG (1,000 UNIT) TABLET Active 25 MCG PO EVERY DAY    Historical Provider, MD   Boswellia-Glucosamine-Vit D (OSTEO BI-FLEX ONE PER DAY PO) Glucosamine/D3/Boswellia Ximena (OSTEO BI-FLEX1 EAC2) 1 EACH TABLET Active 2 EACH PO HS    Historical Provider, MD   Multiple Vitamin (ONE-A-DAY MENS PO) Multivit,Calc,Min/FA/K1/Lycop (ONE-A-DAY MEN'1 EAC5) 240 MCG-30 MCG-300 MCG TABLET Active 1 EACH PO EVERY DAY    Historical Provider, MD   Omega-3 Fatty Acids (FISH OIL) 1000 MG CAPS Omega-3 Fatty Acids * (FISH OIL 1,0001 EAC6) 1,000 MG CAPSULE Active 1000 MG PO EVERY DAY    Historical Provider, MD   Coenzyme Q10 (COQ-10 PO) Ubidecarenone (COQ-17766 MG) 100 MG CAPSULE Active 200 MG PO EVERY DAY    Historical Provider, MD   acetaminophen (TYLENOL) 500 MG tablet Acetaminophen * ([id number] M1) 500 MG TABLET Active 1000 MG PO EVERY 8 HOURS 42 7 October 4th, 2021 1:37pm 10/4/21   Historical Provider, MD   magnesium oxide (MAG-OX) 400 MG tablet Take 1 tablet by mouth daily    Historical Provider, MD   melatonin 3 MG TABS tablet Melatonin * (MELATONIN3 MG) 3 MG TABLET Active 3 MG PO AT BEDTIME AS NEEDED as needed for SLEEP    Historical Provider, MD   fluticasone (FLONASE) 50 MCG/ACT nasal spray 1 spray by Nasal route daily 11/30/21   Rosey Abreu PA-C       Allergies: No Known Allergies     History of allergic reaction to anesthesia:  No    Past Medical History:  Past Medical History:   Diagnosis Date    Anemia     Depression     Essential hypertension     Fatty liver     GERD (gastroesophageal reflux disease)     Hepatitis C antibody test positive 04/21/2022    Hepatitis C without hepatic coma     Hepatitis C without hepatic coma      Hyperlipidemia     meds > 5 yrs    Hypertension     meds > 5 yrs    Insomnia     Laryngitis from reflux of stomach acid 08/04/2015    Osteoarthritis     Osteoarthritis of spine with radiculopathy, lumbar region 01/19/2016    Prostate cancer ([facility] [address] 2019    OR -- no chemo or radiation    Prostate cancer ([facility] [address]     PSVT (paroxysmal supraventricular tachycardia) ([facility] [address] 06/07/2022    Tobacco use disorder 06/08/2022       Past Surgical History:  Past Surgical History:   Procedure Laterality Date    BACK SURGERY  2005    lumbar disc OR / fusion    BACK SURGERY  2016    fusion    CARPAL TUNNEL RELEASE Bilateral 2005    CARPAL TUNNEL RELEASE      COLONOSCOPY  3-2014-PROBABLY 08 not14    (-), 2008 POLYPS    COLONOSCOPY N/A 08/09/2019    COLORECTAL CANCER SCREENING, NOT HIGH RISK performed by Veronique Diaz MD at [address] [place] [address], COLON, DIAGNOSTIC      HIP SURGERY Right 09/10/2021    pt. denies    JOINT REPLACEMENT  2012    RTKR    JOINT REPLACEMENT  2018    LTKR    JOINT REPLACEMENT      KNEE ARTHROSCOPY Bilateral 2014    KNEE SURGERY Left     before LTKR    LUMBAR FUSION N/A 09/28/2020    REMOVAL OF HARDWARE AT L3-4 L2,L3,L4 INSTRUMENTATION L2-3 PLIF L3-4 POSTEROLATERAL FUSION performed by Kinza Balderrama MD at [address]  02/24/2016    DR. ELAM     GA ARTHRP KNE CONDYLE&PLATU MEDIAL&LAT COMPARTMENTS Left 06/18/2018    LEFT KNEE TOTAL KNEE  ARTHROPLASTY, SUPINE, BLOCK PER TKA PROTOCOL, performed by Francisco Mcqueen MD at [address]  06/2020    [facility] Right 2006    ROTATOR CUFF REPAIR Right 10/04/2021    SHOULDER SURGERY      SPINE SURGERY      TONSILLECTOMY  1964    TOTAL KNEE ARTHROPLASTY Right 11/03/2014    ULTRASOUND PROSTATE/TRANSRECTAL N/A 03/20/2019    PROSTATE TRANSRECTAL ULTRASOUND BIOPSY performed by Kevin Azul MD at [facility] History:  Social History     Tobacco Use    Smoking status: Every Day     Packs/day: 0.33 Years: 42.00     Pack years: 13.86     Types: Cigarettes, Cigars     Start date:      Last attempt to quit: 3/1/2020     Years since quittin.9    Smokeless tobacco: Never    Tobacco comments:     Smokes 5 cigars a week   Vaping Use    Vaping Use: Never used   Substance Use Topics    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    Drug use: No       Vital Signs:   Vitals:    23 0836   BP: 110/72   Pulse: 70   Resp: 16   Temp:    SpO2: 94%        Physical Exam:  Cardiac:  [x]WNL  []Comments:  Pulmonary:  [x]WNL   []Comments:   Neuro/Mental Status:  [x]WNL  []Comments:  Abdominal:  [x]WNL    []Comments:  Other:   []WNL  []Comments:    Informed Consent:  The risks and benefits of the procedure have been discussed with either the patient or if they cannot consent, their representative. Assessment:  Patient examined and appropriate for planned sedation and procedure. Plan:  Proceed with planned sedation and procedure as above.     Rosa Elena James MD  8:39 AM

## 2023-01-30 NOTE — ANESTHESIA PRE PROCEDURE
Department of Anesthesiology  Preprocedure Note       Name:  Zain Cabral   Age:  74 y.o.  :  1955                                          MRN:  [id number]         Date:  2023      Surgeon: Tricia Myers):  Jessica Barron MD    Procedure: Procedure(s):  COLORECTAL CANCER SCREENING, NOT HIGH RISK    Medications prior to admission:   Prior to Admission medications    Medication Sig Start Date End Date Taking? Authorizing Provider   atorvastatin (LIPITOR) 20 MG tablet TAKE 1 TABLET BY MOUTH ONE TIME A DAY 22   MOUNIKA Novoa   metoprolol succinate (TOPROL XL) 100 MG extended release tablet One tablet daily 22   MOUNIKA Novoa   omeprazole (PRILOSEC) 20 MG delayed release capsule ALTERNATE TAKING ONE AND TWO CAPSULES BY MOUTH EVERY OTHER DAY.  NO MORE REFILLS UNTIL A FOLLOW UP APPOINTMENT IS SCHEDULED 10/17/22   MOUNIKA Novoa   varenicline (CHANTIX) 1 MG tablet Take 1 tablet by mouth 2 times daily  Patient not taking: Reported on 2022   Caren Feliz MD   varenicline (CHANTIX) 0.5 MG tablet Take 1-2 tablets by mouth See Admin Instructions 0.5mg DAILY for 3 days followed by 0.5mg TWICE DAILY for 4 days followed by 1mg TWICE DAILY  Patient not taking: Reported on 2022   Caren Feliz MD   Ascorbic Acid ER 1000 MG TBCR Take 1 tablet by mouth 2 times daily    Historical Provider, MD   VITAMIN D PO Cholecalciferol (Vitamin D3) * (VITAMIN D325 MC3) 25 MCG (1,000 UNIT) TABLET Active 25 MCG PO EVERY DAY    Historical Provider, MD   Boswellia-Glucosamine-Vit D (OSTEO BI-FLEX ONE PER DAY PO) Glucosamine/D3/Boswellia Ximena (OSTEO BI-FLEX1 EAC2) 1 EACH TABLET Active 2 EACH PO HS    Historical Provider, MD   Multiple Vitamin (ONE-A-DAY MENS PO) Multivit,Calc,Min/FA/K1/Lycop (ONE-A-DAY MEN'1 EAC5) 240 MCG-30 MCG-300 MCG TABLET Active 1 EACH PO EVERY DAY    Historical Provider, MD   Omega-3 Fatty Acids (FISH OIL) 1000 MG CAPS Omega-3 Fatty Acids * (FISH OIL 1,0001 EAC6) 1,000 MG CAPSULE Active 1000 MG PO EVERY DAY    Historical Provider, MD   Coenzyme Q10 (COQ-10 PO) Ubidecarenone (COQ-60232 MG) 100 MG CAPSULE Active 200 MG PO EVERY DAY    Historical Provider, MD   acetaminophen (TYLENOL) 500 MG tablet Acetaminophen * ([id number] M1) 500 MG TABLET Active 1000 MG PO EVERY 8 HOURS 42 7 October 4th, 2021 1:37pm 10/4/21   Historical Provider, MD   magnesium oxide (MAG-OX) 400 MG tablet Take 1 tablet by mouth daily    Historical Provider, MD   melatonin 3 MG TABS tablet Melatonin * (MELATONIN3 MG) 3 MG TABLET Active 3 MG PO AT BEDTIME AS NEEDED as needed for SLEEP    Historical Provider, MD   fluticasone (FLONASE) 50 MCG/ACT nasal spray 1 spray by Nasal route daily 11/30/21   Rosey Abreu PA-C       Current medications:    Current Facility-Administered Medications   Medication Dose Route Frequency Provider Last Rate Last Admin    0.9 % sodium chloride infusion   IntraVENous Continuous Rebekah Gayle  mL/hr at 01/30/23 0751 New Bag at 01/30/23 0751    sterile water for irrigation    PRN Rebekah Gayle MD   1,000 mL at 01/30/23 0734    simethicone (MYLICON) 40 OY/1.9IM drops    PRN Rebekah Gayle MD   60 mg at 01/30/23 6392       Allergies:  No Known Allergies    Problem List:    Patient Active Problem List   Diagnosis Code    Hyperlipidemia E78.5    Essential hypertension I10    Depression F32. A    GERD (gastroesophageal reflux disease) K21.9    Osteoarthritis M19.90    Lumbar stenosis M48.061    Lumbar degenerative disc disease M51.36    Osteoarthritis of spine with radiculopathy, lumbar region M47.26    Osteoarthritis of left knee M17.12    Adenomatous polyp of descending colon D12.4    Adenomatous polyp of sigmoid colon D12.5    Prostate cancer (HCC) C61    Fatty liver K76.0    Pseudoarthrosis of lumbar spine S32.009K    Lumbar spondylosis M47.816    Acute bacterial sinusitis J01.90, B96.89    Hepatitis C antibody test positive R76.8    PSVT (paroxysmal supraventricular tachycardia) (HCC) I47.1    Tobacco use disorder F17.200       Past Medical History:        Diagnosis Date    Anemia     Depression     Essential hypertension     Fatty liver     GERD (gastroesophageal reflux disease)     Hepatitis C antibody test positive 04/21/2022    Hepatitis C without hepatic coma     Hepatitis C without hepatic coma     Hyperlipidemia     meds > 5 yrs    Hypertension     meds > 5 yrs    Insomnia     Laryngitis from reflux of stomach acid 08/04/2015    Osteoarthritis     Osteoarthritis of spine with radiculopathy, lumbar region 01/19/2016    Prostate cancer ([address] 2019    OR -- no chemo or radiation    Prostate cancer ([address] [address]     PSVT (paroxysmal supraventricular tachycardia) ([address] 06/07/2022    Tobacco use disorder 06/08/2022       Past Surgical History:        Procedure Laterality Date    BACK SURGERY  2005    lumbar disc OR / fusion    BACK SURGERY  2016    fusion    CARPAL TUNNEL RELEASE Bilateral 2005    CARPAL TUNNEL RELEASE      COLONOSCOPY  3-2014-PROBABLY 08 not14    (-), 2008 POLYPS    COLONOSCOPY N/A 08/09/2019    COLORECTAL CANCER SCREENING, NOT HIGH RISK performed by Swetha Bucio MD at [facility] [place], DIAGNOSTIC      HIP SURGERY Right 09/10/2021    pt. denies    JOINT REPLACEMENT  2012    RTKR    JOINT REPLACEMENT  2018    LTKR    JOINT REPLACEMENT      KNEE ARTHROSCOPY Bilateral 2014    KNEE SURGERY Left     before LTKR    LUMBAR FUSION N/A 09/28/2020    REMOVAL OF HARDWARE AT L3-4 L2,L3,L4 INSTRUMENTATION L2-3 PLIF L3-4 POSTEROLATERAL FUSION performed by Chasity Stark MD at [facility]  02/24/2016    DR. ELAM     SC ARTHRP KNE CONDYLE&PLATU MEDIAL&LAT COMPARTMENTS Left 06/18/2018    LEFT KNEE TOTAL KNEE  ARTHROPLASTY, SUPINE, BLOCK PER TKA PROTOCOL, performed by Michael Gold MD at [address] [facility]  06/2020    [facility] CUFF REPAIR Right 2006    ROTATOR CUFF REPAIR Right 10/04/2021    SHOULDER SURGERY      SPINE SURGERY      TONSILLECTOMY  1964    TOTAL KNEE ARTHROPLASTY Right 2014    ULTRASOUND PROSTATE/TRANSRECTAL N/A 2019    PROSTATE TRANSRECTAL ULTRASOUND BIOPSY performed by Chanda Ramos MD at [place] [address] History:    Social History     Tobacco Use    Smoking status: Every Day     Packs/day: 0.33     Years: 42.00     Pack years: 13.86     Types: Cigarettes, Cigars     Start date:      Last attempt to quit: 3/1/2020     Years since quittin.9    Smokeless tobacco: Never    Tobacco comments:     Smokes 5 cigars a week   Substance Use Topics    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                                Ready to quit: Yes  Counseling given: Yes  Tobacco comments: Smokes 5 cigars a week      Vital Signs (Current):   Vitals:    23 0752   BP: (!) 161/91   Pulse: 85   Resp: 18   Temp: 36.3 °C (97.4 °F)   TempSrc: Temporal   SpO2: 96%   Weight: 203 lb (92.1 kg)   Height: 5' 10\" (1.778 m)                                              BP Readings from Last 3 Encounters:   23 (!) 161/91   22 122/80   22 120/88       NPO Status: Time of last liquid consumption:                         Time of last solid consumption:                         Date of last liquid consumption: 23                        Date of last solid food consumption: 23    BMI:   Wt Readings from Last 3 Encounters:   23 203 lb (92.1 kg)   22 203 lb (92.1 kg)   22 210 lb (95.3 kg)     Body mass index is 29.13 kg/m².     CBC:   Lab Results   Component Value Date/Time    WBC 6.4 2022 10:02 AM    RBC 5.32 2022 10:02 AM    HGB 15.1 2022 10:02 AM    HCT 47.0 2022 10:02 AM    MCV 88.4 2022 10:02 AM    RDW 15.6 2022 10:02 AM     2022 10:02 AM       CMP:   Lab Results   Component Value Date/Time     2021 08:11 AM    K 4.5 12/01/2021 08:11 AM    K 4.2 09/29/2020 06:17 AM     12/01/2021 08:11 AM    CO2 22 12/01/2021 08:11 AM    BUN 21 12/01/2021 08:11 AM    CREATININE 1.02 12/01/2021 08:11 AM    GFRAA >60.0 12/01/2021 08:11 AM    LABGLOM >60.0 12/01/2021 08:11 AM    GLUCOSE 80 12/01/2021 08:11 AM    PROT 7.0 12/01/2021 08:11 AM    CALCIUM 9.5 12/01/2021 08:11 AM    BILITOT <0.2 12/01/2021 08:11 AM    ALKPHOS 121 12/01/2021 08:11 AM    AST 32 12/01/2021 08:11 AM    ALT 49 12/01/2021 08:11 AM       POC Tests: No results for input(s): POCGLU, POCNA, POCK, POCCL, POCBUN, POCHEMO, POCHCT in the last 72 hours. Coags:   Lab Results   Component Value Date/Time    PROTIME 12.9 09/21/2020 02:53 PM    INR 1.0 09/21/2020 02:53 PM    APTT 27.1 05/21/2018 04:04 PM       HCG (If Applicable): No results found for: PREGTESTUR, PREGSERUM, HCG, HCGQUANT     ABGs: No results found for: PHART, PO2ART, DHS6LGJ, PGD6KDI, BEART, M2TTEFSC     Type & Screen (If Applicable):  No results found for: LABABO, LABRH    Drug/Infectious Status (If Applicable):  No results found for: HIV, HEPCAB    COVID-19 Screening (If Applicable):   Lab Results   Component Value Date/Time    COVID19 Not Detected 09/21/2020 05:12 PM           Anesthesia Evaluation    Airway: Mallampati: II  TM distance: >3 FB   Neck ROM: full  Mouth opening: > = 3 FB   Dental: normal exam         Pulmonary:Negative Pulmonary ROS and normal exam                               Cardiovascular:    (+) hypertension:, hyperlipidemia                  Neuro/Psych:   Negative Neuro/Psych ROS              GI/Hepatic/Renal:   (+) GERD: no interval change, hepatitis: C, liver disease:, bowel prep,           Endo/Other:    (+) : arthritis: OA and no interval change. , malignancy/cancer. Pt had no PAT visit       Abdominal:             Vascular: negative vascular ROS. Other Findings:           Anesthesia Plan      MAC     ASA 3       Induction: intravenous.       Anesthetic plan and risks discussed with patient. Use of blood products discussed with patient whom consented to blood products.    Plan discussed with surgical team.                    DAYNA Quach - CLIFTON   1/30/2023

## 2023-01-30 NOTE — DISCHARGE INSTRUCTIONS
Lower Discharge Instructions    Patient Name: Miladys Moreno  Patient ID: [id number]  YOB: 1955  Procedure: Fitz Schmitz  Referring Physician: [unfilled]  Procedure Date: 1/30/2023    Recommendations:  []   Follow-up appointment with family physician in 4 weeks. []   Colonoscopy recommended in 5  years. []   Follow-up appointment with endoscopist in  Reports of your procedure and these recommendations have been sent to:  [unfilled]    Sedative medication given for procedures can slow your reaction time and coordination for many hours. If you receive medications, it is important for your safety to follow the instructions below for the remainder of the day:  BE TAKEN directly home from the center and rest quietly. DO NOT resume normal activities until tomorrow. Do NOT drive, return to work, or operate any machinery or power tools. Do NOT make any important personal or business decisions, sign any legal papers or perform any activity that depends on your full concentration power or mental judgement. Do NOT drink any alcohol or take nerve or sleeping drugs. They add to the effects of the medicine still present in your body.    [] (Checked if a biopsy or polyp removal was performed)  There is a slight risk of bleeding. If you had a biopsy or a polyp removed, we suggest that you follow the instructions below:  Do NOT take aspirin or similar anti-inflammatory medicine for ___ days. Do NOT exercise, jog, or do any heavy lifting or straining for 1 day. Potential common after effects and treatment following the procedure:  Mild abdominal pain, bloating, or excessive gas - rest, eat lightly, and use a heating pad. Redness and/or swelling where the IV was - apply heat and elevate. Symptoms to report to your physician:  Severe abdominal pain or bloating. Chills or fever above 101 degrees occurring within 24 hours after procedure. Large amounts of rectal bleeding that does not stop.  Small amount of rectal bleeding is not serious especially if hemorrhoids are present. Unable to keep down food or drink. IV site stays red and swollen for more than 2 days. In the case of an emergency, please go to the emergency room. If you are not having an emergency but are having some of the above symptoms please call the doctor's office at:  Dr. Zuly London [phone]   @[email or web address]      I have read and understand the above instructions:            ____________________________         ____________________________  Patient or Patient Rep. Signature   Witness Signature      Date: 1/30/2023  Time:

## 2023-01-30 NOTE — ANESTHESIA POSTPROCEDURE EVALUATION
Department of Anesthesiology  Postprocedure Note    Patient: Wendy Lomeli  MRN: [id number]  YOB: 1955  Date of evaluation: 1/30/2023      Procedure Summary     Date: 01/30/23 Room / Location: [address] [place] [address]    Anesthesia Start: Priscille Pinna Anesthesia Stop: 5735    Procedure: COLORECTAL CANCER SCREENING, NOT HIGH RISK Diagnosis:       Colon cancer screening      (Colon cancer screening [Z12.11])    Surgeons: Tawanna Hilton MD Responsible Provider: DAYNA Morataya CRNA    Anesthesia Type: MAC ASA Status: 3          Anesthesia Type: MAC    Jeannine Phase I:      Jeannine Phase II:        Anesthesia Post Evaluation    Patient location during evaluation: bedside  Patient participation: complete - patient participated  Level of consciousness: awake and awake and alert  Airway patency: patent  Nausea & Vomiting: no nausea and no vomiting  Complications: no  Cardiovascular status: blood pressure returned to baseline and hemodynamically stable  Respiratory status: acceptable  Hydration status: euvolemic

## 2023-02-23 DIAGNOSIS — K21.9 GASTROESOPHAGEAL REFLUX DISEASE, UNSPECIFIED WHETHER ESOPHAGITIS PRESENT: ICD-10-CM

## 2023-02-23 NOTE — TELEPHONE ENCOUNTER
Rx request   Requested Prescriptions     Pending Prescriptions Disp Refills    omeprazole (PRILOSEC) 20 MG delayed release capsule [Pharmacy Med Name: Omeprazole Oral Capsule Delayed Release 20 MG] 130 capsule 0     Sig: alternate taking 1 and 2 capsules by mouth every other day. follow-up appointment required for refills.      LOV 12/5/2022  Last refill 10/17/22  Next Visit Date:  Future Appointments   Date Time Provider Cas Arnold   6/5/2023  8:15 AM MOUNIKA Bates [facility]   6/5/2023  8:30 AM Pranav Bates [facility]   6/6/2023  8:30 AM Donny Bryan, DO [facility]

## 2023-02-24 RX ORDER — OMEPRAZOLE 20 MG/1
CAPSULE, DELAYED RELEASE ORAL
Qty: 130 CAPSULE | Refills: 0 | Status: SHIPPED | OUTPATIENT
Start: 2023-02-24

## 2023-05-31 ENCOUNTER — OFFICE VISIT (OUTPATIENT)
Dept: FAMILY MEDICINE CLINIC | Age: 68
End: 2023-05-31
Payer: MEDICARE

## 2023-05-31 VITALS
SYSTOLIC BLOOD PRESSURE: 130 MMHG | TEMPERATURE: 96.9 F | HEART RATE: 65 BPM | HEIGHT: 68 IN | DIASTOLIC BLOOD PRESSURE: 70 MMHG | OXYGEN SATURATION: 97 % | WEIGHT: 204 LBS | BODY MASS INDEX: 30.92 KG/M2

## 2023-05-31 DIAGNOSIS — I10 ESSENTIAL HYPERTENSION: ICD-10-CM

## 2023-05-31 DIAGNOSIS — I47.1 PSVT (PAROXYSMAL SUPRAVENTRICULAR TACHYCARDIA) (HCC): ICD-10-CM

## 2023-05-31 DIAGNOSIS — E78.2 MIXED HYPERLIPIDEMIA: ICD-10-CM

## 2023-05-31 PROCEDURE — 1123F ACP DISCUSS/DSCN MKR DOCD: CPT | Performed by: PHYSICIAN ASSISTANT

## 2023-05-31 PROCEDURE — 3075F SYST BP GE 130 - 139MM HG: CPT | Performed by: PHYSICIAN ASSISTANT

## 2023-05-31 PROCEDURE — 99213 OFFICE O/P EST LOW 20 MIN: CPT | Performed by: PHYSICIAN ASSISTANT

## 2023-05-31 PROCEDURE — 3078F DIAST BP <80 MM HG: CPT | Performed by: PHYSICIAN ASSISTANT

## 2023-05-31 RX ORDER — METOPROLOL SUCCINATE 100 MG/1
TABLET, EXTENDED RELEASE ORAL
Qty: 30 TABLET | Refills: 5 | Status: SHIPPED | OUTPATIENT
Start: 2023-05-31

## 2023-05-31 RX ORDER — IBUPROFEN 800 MG/1
TABLET ORAL
COMMUNITY
Start: 2023-03-21

## 2023-05-31 RX ORDER — CELECOXIB 200 MG/1
CAPSULE ORAL
COMMUNITY
Start: 2023-05-23

## 2023-05-31 RX ORDER — ATORVASTATIN CALCIUM 20 MG/1
TABLET, FILM COATED ORAL
Qty: 30 TABLET | Refills: 5 | Status: SHIPPED | OUTPATIENT
Start: 2023-05-31

## 2023-05-31 SDOH — ECONOMIC STABILITY: FOOD INSECURITY: WITHIN THE PAST 12 MONTHS, YOU WORRIED THAT YOUR FOOD WOULD RUN OUT BEFORE YOU GOT MONEY TO BUY MORE.: NEVER TRUE

## 2023-05-31 SDOH — ECONOMIC STABILITY: HOUSING INSECURITY
IN THE LAST 12 MONTHS, WAS THERE A TIME WHEN YOU DID NOT HAVE A STEADY PLACE TO SLEEP OR SLEPT IN A SHELTER (INCLUDING NOW)?: NO

## 2023-05-31 SDOH — ECONOMIC STABILITY: FOOD INSECURITY: WITHIN THE PAST 12 MONTHS, THE FOOD YOU BOUGHT JUST DIDN'T LAST AND YOU DIDN'T HAVE MONEY TO GET MORE.: NEVER TRUE

## 2023-05-31 SDOH — ECONOMIC STABILITY: INCOME INSECURITY: HOW HARD IS IT FOR YOU TO PAY FOR THE VERY BASICS LIKE FOOD, HOUSING, MEDICAL CARE, AND HEATING?: NOT HARD AT ALL

## 2023-05-31 ASSESSMENT — PATIENT HEALTH QUESTIONNAIRE - PHQ9
SUM OF ALL RESPONSES TO PHQ9 QUESTIONS 1 & 2: 0
3. TROUBLE FALLING OR STAYING ASLEEP: 0
SUM OF ALL RESPONSES TO PHQ QUESTIONS 1-9: 0
7. TROUBLE CONCENTRATING ON THINGS, SUCH AS READING THE NEWSPAPER OR WATCHING TELEVISION: 0
2. FEELING DOWN, DEPRESSED OR HOPELESS: 0
1. LITTLE INTEREST OR PLEASURE IN DOING THINGS: 0
4. FEELING TIRED OR HAVING LITTLE ENERGY: 0
SUM OF ALL RESPONSES TO PHQ QUESTIONS 1-9: 0
9. THOUGHTS THAT YOU WOULD BE BETTER OFF DEAD, OR OF HURTING YOURSELF: 0
6. FEELING BAD ABOUT YOURSELF - OR THAT YOU ARE A FAILURE OR HAVE LET YOURSELF OR YOUR FAMILY DOWN: 0
SUM OF ALL RESPONSES TO PHQ QUESTIONS 1-9: 0
5. POOR APPETITE OR OVEREATING: 0
SUM OF ALL RESPONSES TO PHQ QUESTIONS 1-9: 0
10. IF YOU CHECKED OFF ANY PROBLEMS, HOW DIFFICULT HAVE THESE PROBLEMS MADE IT FOR YOU TO DO YOUR WORK, TAKE CARE OF THINGS AT HOME, OR GET ALONG WITH OTHER PEOPLE: 0
8. MOVING OR SPEAKING SO SLOWLY THAT OTHER PEOPLE COULD HAVE NOTICED. OR THE OPPOSITE, BEING SO FIGETY OR RESTLESS THAT YOU HAVE BEEN MOVING AROUND A LOT MORE THAN USUAL: 0

## 2023-05-31 ASSESSMENT — ENCOUNTER SYMPTOMS
SORE THROAT: 0
DIARRHEA: 0
NAUSEA: 0
SHORTNESS OF BREATH: 0
SINUS PRESSURE: 0
COUGH: 0
CHEST TIGHTNESS: 0
BACK PAIN: 0
VOMITING: 0
SINUS PAIN: 0
ABDOMINAL PAIN: 0

## 2023-05-31 ASSESSMENT — VISUAL ACUITY: OU: 1

## 2023-05-31 NOTE — PROGRESS NOTES
EVERY 8 HOURS 42 7 October 4th, 2021 1:37pm (Patient not taking: Reported on 5/31/2023)       No current facility-administered medications for this visit. PMH, Surgical Hx, Family Hx, and Social Hx reviewed and updated. Health Maintenance reviewed. Objective  Vitals:    05/31/23 1310   BP: 130/70   Pulse: 65   Temp: 96.9 °F (36.1 °C)   SpO2: 97%   Weight: 204 lb (92.5 kg)   Height: 5' 8\" (1.727 m)     BP Readings from Last 3 Encounters:   05/31/23 130/70   01/30/23 126/79   12/05/22 122/80     Wt Readings from Last 3 Encounters:   05/31/23 204 lb (92.5 kg)   01/30/23 203 lb (92.1 kg)   12/05/22 203 lb (92.1 kg)       Lab Results   Component Value Date    LABA1C 6.0 (H) 04/21/2022    LABA1C 6.0 (H) 12/01/2021    LABA1C 5.9 09/04/2019     Lab Results   Component Value Date    CREATININE 1.02 12/01/2021     Lab Results   Component Value Date    ALT 49 (H) 12/01/2021    AST 32 12/01/2021     Lab Results   Component Value Date    CHOL 145 12/08/2018    TRIG 53 12/08/2018    HDL 43 04/21/2022    LDLCALC 86 04/21/2022          Physical Exam  Vitals and nursing note reviewed. Constitutional:       General: He is awake. He is not in acute distress. Appearance: Normal appearance. He is well-developed. He is not ill-appearing, toxic-appearing or diaphoretic. HENT:      Head: Normocephalic and atraumatic. Right Ear: Hearing and external ear normal.      Left Ear: Hearing and external ear normal.      Nose: Nose normal.   Eyes:      General: Lids are normal. Vision grossly intact. Gaze aligned appropriately. Conjunctiva/sclera: Conjunctivae normal.      Pupils: Pupils are equal, round, and reactive to light. Cardiovascular:      Rate and Rhythm: Normal rate and regular rhythm. Pulses: Normal pulses. Heart sounds: Normal heart sounds, S1 normal and S2 normal.   Pulmonary:      Effort: Pulmonary effort is normal.      Breath sounds: Normal breath sounds and air entry.    Musculoskeletal:

## 2023-06-21 ENCOUNTER — OFFICE VISIT (OUTPATIENT)
Dept: FAMILY MEDICINE CLINIC | Age: 68
End: 2023-06-21
Payer: MEDICARE

## 2023-06-21 VITALS
HEART RATE: 58 BPM | SYSTOLIC BLOOD PRESSURE: 122 MMHG | HEIGHT: 68 IN | RESPIRATION RATE: 16 BRPM | WEIGHT: 205 LBS | DIASTOLIC BLOOD PRESSURE: 70 MMHG | TEMPERATURE: 96.7 F | OXYGEN SATURATION: 95 % | BODY MASS INDEX: 31.07 KG/M2

## 2023-06-21 DIAGNOSIS — R73.03 PRE-DIABETES: ICD-10-CM

## 2023-06-21 DIAGNOSIS — Z13.6 SCREENING FOR CARDIOVASCULAR CONDITION: ICD-10-CM

## 2023-06-21 DIAGNOSIS — Z23 NEED FOR PROPHYLACTIC VACCINATION AND INOCULATION AGAINST VARICELLA: ICD-10-CM

## 2023-06-21 DIAGNOSIS — Z87.891 PERSONAL HISTORY OF TOBACCO USE, PRESENTING HAZARDS TO HEALTH: ICD-10-CM

## 2023-06-21 DIAGNOSIS — Z23 NEED FOR ZOSTER VACCINATION: ICD-10-CM

## 2023-06-21 DIAGNOSIS — Z13.1 SCREENING FOR DIABETES MELLITUS: ICD-10-CM

## 2023-06-21 DIAGNOSIS — Z00.00 INITIAL MEDICARE ANNUAL WELLNESS VISIT: Primary | ICD-10-CM

## 2023-06-21 DIAGNOSIS — Z87.891 PERSONAL HISTORY OF TOBACCO USE: ICD-10-CM

## 2023-06-21 DIAGNOSIS — Z12.2 ENCOUNTER FOR SCREENING FOR CANCER OF RESPIRATORY ORGANS: ICD-10-CM

## 2023-06-21 DIAGNOSIS — Z13.220 SCREENING FOR HYPERLIPIDEMIA: ICD-10-CM

## 2023-06-21 DIAGNOSIS — E66.09 CLASS 1 OBESITY DUE TO EXCESS CALORIES WITH SERIOUS COMORBIDITY AND BODY MASS INDEX (BMI) OF 31.0 TO 31.9 IN ADULT: ICD-10-CM

## 2023-06-21 DIAGNOSIS — C61 PROSTATE CANCER (HCC): ICD-10-CM

## 2023-06-21 DIAGNOSIS — Z12.5 SCREENING FOR PROSTATE CANCER: ICD-10-CM

## 2023-06-21 LAB
CHOLEST SERPL-MCNC: 114 MG/DL (ref 0–199)
HBA1C MFR BLD: 6.1 % (ref 4.8–5.9)
HDLC SERPL-MCNC: 41 MG/DL (ref 40–59)
LDLC SERPL CALC-MCNC: 63 MG/DL (ref 0–129)
PSA SERPL-MCNC: <0.01 NG/ML (ref 0–4)
TRIGL SERPL-MCNC: 49 MG/DL (ref 0–150)

## 2023-06-21 PROCEDURE — 1123F ACP DISCUSS/DSCN MKR DOCD: CPT | Performed by: PHYSICIAN ASSISTANT

## 2023-06-21 PROCEDURE — 3074F SYST BP LT 130 MM HG: CPT | Performed by: PHYSICIAN ASSISTANT

## 2023-06-21 PROCEDURE — G0438 PPPS, INITIAL VISIT: HCPCS | Performed by: PHYSICIAN ASSISTANT

## 2023-06-21 PROCEDURE — G0296 VISIT TO DETERM LDCT ELIG: HCPCS | Performed by: PHYSICIAN ASSISTANT

## 2023-06-21 PROCEDURE — G0447 BEHAVIOR COUNSEL OBESITY 15M: HCPCS | Performed by: PHYSICIAN ASSISTANT

## 2023-06-21 PROCEDURE — G0446 INTENS BEHAVE THER CARDIO DX: HCPCS | Performed by: PHYSICIAN ASSISTANT

## 2023-06-21 PROCEDURE — 99406 BEHAV CHNG SMOKING 3-10 MIN: CPT | Performed by: PHYSICIAN ASSISTANT

## 2023-06-21 PROCEDURE — 3078F DIAST BP <80 MM HG: CPT | Performed by: PHYSICIAN ASSISTANT

## 2023-06-21 RX ORDER — ZOSTER VACCINE RECOMBINANT, ADJUVANTED 50 MCG/0.5
0.5 KIT INTRAMUSCULAR SEE ADMIN INSTRUCTIONS
Qty: 0.5 ML | Refills: 0 | Status: SHIPPED | OUTPATIENT
Start: 2023-06-21 | End: 2023-06-22

## 2023-06-21 ASSESSMENT — PATIENT HEALTH QUESTIONNAIRE - PHQ9
SUM OF ALL RESPONSES TO PHQ QUESTIONS 1-9: 0
7. TROUBLE CONCENTRATING ON THINGS, SUCH AS READING THE NEWSPAPER OR WATCHING TELEVISION: 0
8. MOVING OR SPEAKING SO SLOWLY THAT OTHER PEOPLE COULD HAVE NOTICED. OR THE OPPOSITE, BEING SO FIGETY OR RESTLESS THAT YOU HAVE BEEN MOVING AROUND A LOT MORE THAN USUAL: 0
4. FEELING TIRED OR HAVING LITTLE ENERGY: 0
1. LITTLE INTEREST OR PLEASURE IN DOING THINGS: 0
3. TROUBLE FALLING OR STAYING ASLEEP: 0
9. THOUGHTS THAT YOU WOULD BE BETTER OFF DEAD, OR OF HURTING YOURSELF: 0
6. FEELING BAD ABOUT YOURSELF - OR THAT YOU ARE A FAILURE OR HAVE LET YOURSELF OR YOUR FAMILY DOWN: 0
SUM OF ALL RESPONSES TO PHQ QUESTIONS 1-9: 0
SUM OF ALL RESPONSES TO PHQ QUESTIONS 1-9: 0
2. FEELING DOWN, DEPRESSED OR HOPELESS: 0
SUM OF ALL RESPONSES TO PHQ9 QUESTIONS 1 & 2: 0
SUM OF ALL RESPONSES TO PHQ QUESTIONS 1-9: 0
5. POOR APPETITE OR OVEREATING: 0
10. IF YOU CHECKED OFF ANY PROBLEMS, HOW DIFFICULT HAVE THESE PROBLEMS MADE IT FOR YOU TO DO YOUR WORK, TAKE CARE OF THINGS AT HOME, OR GET ALONG WITH OTHER PEOPLE: 0

## 2023-06-21 ASSESSMENT — LIFESTYLE VARIABLES
HOW MANY STANDARD DRINKS CONTAINING ALCOHOL DO YOU HAVE ON A TYPICAL DAY: PATIENT DOES NOT DRINK
HOW OFTEN DO YOU HAVE A DRINK CONTAINING ALCOHOL: NEVER

## 2023-06-21 NOTE — PROGRESS NOTES
was provided regarding recommended lifestyle changes. Patient's individual cardiovascular disease risk factors, including advanced age (> 54 for men, > 72 for women), hypertension, male gender, obesity (BMI >= 30), and smoking/tobacco exposure, were discussed, as well as the likely benefits of lifestyle changes. Based upon patient's motivation to change his behavior, the following plan was agreed upon to work toward lowering cardiovascular disease risk: Mediterranean diet, DASH diet, at least 150 minutes of exercise/week, and tobacco cessation. Aspirin use for primary prevention of cardiovascular disease for men 45-79 and women 55-79: Indicated- start daily aspirin. Educational materials for lifestyle changes were provided. Patient will follow-up in 3 month(s) with PCP. Provider spent 8 minutes counseling patient. Objective   Vitals:    06/21/23 0816   BP: 122/70   Site: Right Upper Arm   Pulse: 58   Resp: 16   Temp: (!) 96.7 °F (35.9 °C)   TempSrc: Temporal   SpO2: 95%   Weight: 205 lb (93 kg)   Height: 5' 8\" (1.727 m)      Body mass index is 31.17 kg/m². No Known Allergies  Prior to Visit Medications    Medication Sig Taking? Authorizing Provider   zoster recombinant adjuvanted vaccine [facility]) 50 MCG/0.5ML SUSR injection Inject 0.5 mLs into the muscle once for 1 dose Yes Mandy Greenwood, PA   celecoxib (CELEBREX) 200 MG capsule TAKE ONE CAPSULE BY MOUTH TWICE DAILY AS NEEDED FOR PAIN Yes Historical Provider, MD    MG tablet TAKE ONE TABLET BY MOUTH TWICE DAILY AS NEEDED Yes Historical Provider, MD   atorvastatin (LIPITOR) 20 MG tablet TAKE 1 TABLET BY MOUTH ONE TIME A DAY Yes MOUNIKA Cooney   metoprolol succinate (TOPROL XL) 100 MG extended release tablet One tablet daily Yes Mandy Greenwood PA   omeprazole (PRILOSEC) 20 MG delayed release capsule alternate taking 1 and 2 capsules by mouth every other day. follow-up appointment required for refills.  Yes Mandy Greenwood, [address]

## 2023-06-21 NOTE — PATIENT INSTRUCTIONS
Learning About Vision Tests  What are vision tests? The four most common vision tests are visual acuity tests, refraction, visual field tests, and color vision tests. Visual acuity (sharpness) tests  These tests are used: To see if you need glasses or contact lenses. To monitor an eye problem. To check an eye injury. Visual acuity tests are done as part of routine exams. You may also have this test when you get your 's license or apply for some types of jobs. Visual field tests  These tests are used: To check for vision loss in any area of your range of vision. To screen for certain eye diseases. To look for nerve damage after a stroke, head injury, or other problem that could reduce blood flow to the brain. Refraction and color tests  A refraction test is done to find the right prescription for glasses and contact lenses. A color vision test is done to check for color blindness. Color vision is often tested as part of a routine exam. You may also have this test when you apply for a job where recognizing different colors is important, such as , electronics, or the [employer]. How are vision tests done? Visual acuity test   You cover one eye at a time. You read aloud from a wall chart across the room. You read aloud from a small card that you hold in your hand. Refraction   You look into a special device. The device puts lenses of different strengths in front of each eye to see how strong your glasses or contact lenses need to be. Visual field tests   Your doctor may have you look through special machines. Or your doctor may simply have you stare straight ahead while they move a finger into and out of your field of vision. Color vision test   You look at pieces of printed test patterns in various colors. You say what number or symbol you see. Your doctor may have you trace the number or symbol using a pointer. How do these tests feel?   There is very little chance of

## 2023-07-06 ENCOUNTER — HOSPITAL ENCOUNTER (OUTPATIENT)
Dept: CT IMAGING | Age: 68
Discharge: HOME | End: 2023-07-08
Payer: MEDICARE

## 2023-07-06 DIAGNOSIS — Z87.891 PERSONAL HISTORY OF TOBACCO USE: ICD-10-CM

## 2023-07-06 PROCEDURE — 71271 CT THORAX LUNG CANCER SCR C-: CPT

## 2023-09-27 DIAGNOSIS — I10 ESSENTIAL HYPERTENSION: ICD-10-CM

## 2023-09-27 DIAGNOSIS — E78.2 MIXED HYPERLIPIDEMIA: ICD-10-CM

## 2023-09-28 RX ORDER — ATORVASTATIN CALCIUM 20 MG/1
TABLET, FILM COATED ORAL
Qty: 30 TABLET | Refills: 5 | Status: SHIPPED | OUTPATIENT
Start: 2023-09-28

## 2023-09-28 RX ORDER — METOPROLOL SUCCINATE 100 MG/1
TABLET, EXTENDED RELEASE ORAL
Qty: 30 TABLET | Refills: 5 | Status: SHIPPED | OUTPATIENT
Start: 2023-09-28

## 2024-03-20 DIAGNOSIS — E78.2 MIXED HYPERLIPIDEMIA: ICD-10-CM

## 2024-03-20 RX ORDER — ATORVASTATIN CALCIUM 20 MG/1
TABLET, FILM COATED ORAL
Qty: 30 TABLET | Refills: 5 | Status: SHIPPED | OUTPATIENT
Start: 2024-03-20

## 2024-03-20 NOTE — TELEPHONE ENCOUNTER
Comments:     Last Office Visit (last PCP visit):   12/15/2023    Next Visit Date:  No future appointments.    **If hasn't been seen in over a year OR hasn't followed up according to last diabetes/ADHD visit, make appointment for patient before sending refill to provider.    Rx requested:  Requested Prescriptions     Pending Prescriptions Disp Refills    atorvastatin (LIPITOR) 20 MG tablet [Pharmacy Med Name: Atorvastatin Calcium Oral Tablet 20 MG] 30 tablet 5     Sig: TAKE ONE TABLET BY MOUTH once DAILY

## 2024-04-29 NOTE — PROGRESS NOTES
Subjective  Paolo Devine 1955 is a 69 y.o. male who presents today with:  Chief Complaint   Patient presents with    Follow-up     Discuss chantix        HPI  Smoking Cessation  - has tried nicotine replacement therapy in the past with no success. Notes he needs to stop smoking to help support his wife's and personal health.   - Patient would like to start pharmacological treatment. Has not been on chantix in the past.     HTN/SVT  Patient is here for f/u HTN. Is compliant with meds and has no side effects from them. Avoids added salt. Tries to eat healthy. Exercises occasionally. Has no chest pain, shortness of breath, palpitations or edema. Patient denies palpitations or tachycardia.      HLD  Patient is here for hyperlipidemia. Is compliant with medications and has no apparent side effects from them.     GERD  Well-controlled on prn PPI, with some caffeine restriction, diet restriction. No weight loss. No abdominal pain. No bloody or black tarry stools.    Pre-diabetic   - POCT A1c 5.9%  - having occasional lightheadness in the afternoons. Sometimes change in his vision on the left side. Patient notes it happening after he hasn't eaten in a while. Sometimes will get shaky as well. Eating helps resolve the problem. No HA, unilateral weakness, or pain with the effected eye.        Review of Systems   Constitutional:  Negative for activity change, appetite change, chills and fever.   HENT:  Negative for congestion, drooling, sinus pressure, sinus pain and sore throat.    Eyes:  Negative for visual disturbance.   Respiratory:  Negative for cough, chest tightness and shortness of breath.    Cardiovascular:  Negative for chest pain.   Gastrointestinal:  Negative for abdominal pain, diarrhea, nausea and vomiting.   Endocrine: Negative for cold intolerance.   Genitourinary:  Negative for dysuria, flank pain, frequency and hematuria.   Musculoskeletal:  Negative for arthralgias and back pain.   Skin:  Negative

## 2024-05-01 ENCOUNTER — OFFICE VISIT (OUTPATIENT)
Dept: FAMILY MEDICINE CLINIC | Age: 69
End: 2024-05-01
Payer: MEDICARE

## 2024-05-01 VITALS
DIASTOLIC BLOOD PRESSURE: 78 MMHG | WEIGHT: 199 LBS | RESPIRATION RATE: 16 BRPM | SYSTOLIC BLOOD PRESSURE: 126 MMHG | BODY MASS INDEX: 30.16 KG/M2 | OXYGEN SATURATION: 100 % | TEMPERATURE: 96.6 F | HEIGHT: 68 IN | HEART RATE: 76 BPM

## 2024-05-01 DIAGNOSIS — I47.10 PSVT (PAROXYSMAL SUPRAVENTRICULAR TACHYCARDIA) (HCC): ICD-10-CM

## 2024-05-01 DIAGNOSIS — C61 PROSTATE CANCER (HCC): ICD-10-CM

## 2024-05-01 DIAGNOSIS — R73.03 PRE-DIABETES: Primary | ICD-10-CM

## 2024-05-01 DIAGNOSIS — F17.200 TOBACCO USE DISORDER: Chronic | ICD-10-CM

## 2024-05-01 DIAGNOSIS — R42 LIGHTHEADEDNESS: ICD-10-CM

## 2024-05-01 DIAGNOSIS — I10 ESSENTIAL HYPERTENSION: ICD-10-CM

## 2024-05-01 LAB — HBA1C MFR BLD: 5.9 %

## 2024-05-01 PROCEDURE — 1123F ACP DISCUSS/DSCN MKR DOCD: CPT | Performed by: PHYSICIAN ASSISTANT

## 2024-05-01 PROCEDURE — 99214 OFFICE O/P EST MOD 30 MIN: CPT | Performed by: PHYSICIAN ASSISTANT

## 2024-05-01 PROCEDURE — 83036 HEMOGLOBIN GLYCOSYLATED A1C: CPT | Performed by: PHYSICIAN ASSISTANT

## 2024-05-01 PROCEDURE — 3078F DIAST BP <80 MM HG: CPT | Performed by: PHYSICIAN ASSISTANT

## 2024-05-01 PROCEDURE — 3074F SYST BP LT 130 MM HG: CPT | Performed by: PHYSICIAN ASSISTANT

## 2024-05-01 RX ORDER — VARENICLINE TARTRATE 1 MG/1
1 TABLET, FILM COATED ORAL 2 TIMES DAILY
Qty: 60 TABLET | Refills: 5 | Status: SHIPPED | OUTPATIENT
Start: 2024-05-29

## 2024-05-01 RX ORDER — VARENICLINE TARTRATE 0.5 MG/1
.5-1 TABLET, FILM COATED ORAL SEE ADMIN INSTRUCTIONS
Qty: 57 TABLET | Refills: 0 | Status: SHIPPED | OUTPATIENT
Start: 2024-05-01

## 2024-05-01 ASSESSMENT — ENCOUNTER SYMPTOMS
DIARRHEA: 0
CHEST TIGHTNESS: 0
COUGH: 0
ABDOMINAL PAIN: 0
SHORTNESS OF BREATH: 0
BACK PAIN: 0
SORE THROAT: 0
SINUS PRESSURE: 0
SINUS PAIN: 0
VOMITING: 0
NAUSEA: 0

## 2024-05-01 ASSESSMENT — PATIENT HEALTH QUESTIONNAIRE - PHQ9
3. TROUBLE FALLING OR STAYING ASLEEP: NOT AT ALL
SUM OF ALL RESPONSES TO PHQ QUESTIONS 1-9: 0
1. LITTLE INTEREST OR PLEASURE IN DOING THINGS: NOT AT ALL
SUM OF ALL RESPONSES TO PHQ9 QUESTIONS 1 & 2: 0
9. THOUGHTS THAT YOU WOULD BE BETTER OFF DEAD, OR OF HURTING YOURSELF: NOT AT ALL
8. MOVING OR SPEAKING SO SLOWLY THAT OTHER PEOPLE COULD HAVE NOTICED. OR THE OPPOSITE, BEING SO FIGETY OR RESTLESS THAT YOU HAVE BEEN MOVING AROUND A LOT MORE THAN USUAL: NOT AT ALL
SUM OF ALL RESPONSES TO PHQ QUESTIONS 1-9: 0
2. FEELING DOWN, DEPRESSED OR HOPELESS: NOT AT ALL
10. IF YOU CHECKED OFF ANY PROBLEMS, HOW DIFFICULT HAVE THESE PROBLEMS MADE IT FOR YOU TO DO YOUR WORK, TAKE CARE OF THINGS AT HOME, OR GET ALONG WITH OTHER PEOPLE: NOT DIFFICULT AT ALL
4. FEELING TIRED OR HAVING LITTLE ENERGY: NOT AT ALL
5. POOR APPETITE OR OVEREATING: NOT AT ALL
SUM OF ALL RESPONSES TO PHQ QUESTIONS 1-9: 0
SUM OF ALL RESPONSES TO PHQ QUESTIONS 1-9: 0
7. TROUBLE CONCENTRATING ON THINGS, SUCH AS READING THE NEWSPAPER OR WATCHING TELEVISION: NOT AT ALL
6. FEELING BAD ABOUT YOURSELF - OR THAT YOU ARE A FAILURE OR HAVE LET YOURSELF OR YOUR FAMILY DOWN: NOT AT ALL

## 2024-05-01 ASSESSMENT — VISUAL ACUITY: OU: 1

## 2024-05-17 DIAGNOSIS — I10 ESSENTIAL HYPERTENSION: ICD-10-CM

## 2024-05-17 RX ORDER — METOPROLOL SUCCINATE 100 MG/1
TABLET, EXTENDED RELEASE ORAL
Qty: 30 TABLET | Refills: 0 | Status: SHIPPED | OUTPATIENT
Start: 2024-05-17

## 2024-05-17 NOTE — TELEPHONE ENCOUNTER
Comments:     Last Office Visit (last PCP visit):   5/1/2024    Next Visit Date:  Future Appointments   Date Time Provider Department Center   8/1/2024  8:45 AM Aminta Pepper PA [facility]   8/1/2024  9:00 AM Aminta Pepper PA [facility]       **If hasn't been seen in over a year OR hasn't followed up according to last diabetes/ADHD visit, make appointment for patient before sending refill to provider.    Rx requested:  Requested Prescriptions     Pending Prescriptions Disp Refills    metoprolol succinate (TOPROL XL) 100 MG extended release tablet [Pharmacy Med Name: Metoprolol Succinate ER Oral Tablet Extended Release 24 Hour 100 MG] 30 tablet 0     Sig: TAKE ONE TABLET BY MOUTH EVERY DAY

## 2024-07-30 NOTE — PROGRESS NOTES
Subjective  Paolo Devine 1955 is a 69 y.o. male who presents today with:  Chief Complaint   Patient presents with    Follow-up     Pt here to follow up         HPI  Smoking Cessation  - has tried nicotine replacement therapy in the past with no success. Notes he needs to stop smoking to help support his wife's and personal health.   - Patient would like to start pharmacological treatment. Has not been on chantix in the past. Using with accupuncture. Chantix is helping but getting angry and feels depression     HTN/SVT  Patient is here for f/u HTN. Is compliant with meds and has no side effects from them. Avoids added salt. Tries to eat healthy. Exercises occasionally. Has no chest pain, shortness of breath, palpitations or edema. Patient denies palpitations or tachycardia.      HLD  Patient is here for hyperlipidemia. Is compliant with medications and has no apparent side effects from them.     GERD  Well-controlled on prn PPI, with some caffeine restriction, diet restriction. No weight loss. No abdominal pain. No bloody or black tarry stools.     Pre-diabetic   - POCT A1c 5.9%  - having occasional lightheadness in the afternoons. Sometimes change in his vision on the left side. Patient notes it happening after he hasn't eaten in a while. Sometimes will get shaky as well. Eating helps resolve the problem. No HA, unilateral weakness, or pain with the effected eye.   - patient is eating better.     Review of Systems   Constitutional:  Negative for activity change, appetite change, chills and fever.   HENT:  Negative for congestion, drooling, sinus pressure, sinus pain and sore throat.    Eyes:  Negative for visual disturbance.   Respiratory:  Negative for cough, chest tightness and shortness of breath.    Cardiovascular:  Negative for chest pain.   Gastrointestinal:  Negative for abdominal pain, diarrhea, nausea and vomiting.   Endocrine: Negative for cold intolerance.   Genitourinary:  Negative for

## 2024-08-01 ENCOUNTER — OFFICE VISIT (OUTPATIENT)
Dept: FAMILY MEDICINE CLINIC | Age: 69
End: 2024-08-01
Payer: MEDICARE

## 2024-08-01 ENCOUNTER — OFFICE VISIT (OUTPATIENT)
Dept: FAMILY MEDICINE CLINIC | Age: 69
End: 2024-08-01

## 2024-08-01 VITALS
OXYGEN SATURATION: 96 % | HEART RATE: 72 BPM | SYSTOLIC BLOOD PRESSURE: 136 MMHG | BODY MASS INDEX: 30.81 KG/M2 | DIASTOLIC BLOOD PRESSURE: 80 MMHG | RESPIRATION RATE: 16 BRPM | TEMPERATURE: 98.5 F | HEIGHT: 68 IN | WEIGHT: 203.26 LBS

## 2024-08-01 DIAGNOSIS — I47.10 PSVT (PAROXYSMAL SUPRAVENTRICULAR TACHYCARDIA) (HCC): ICD-10-CM

## 2024-08-01 DIAGNOSIS — R73.03 PRE-DIABETES: ICD-10-CM

## 2024-08-01 DIAGNOSIS — Z00.00 MEDICARE ANNUAL WELLNESS VISIT, SUBSEQUENT: Primary | ICD-10-CM

## 2024-08-01 DIAGNOSIS — Z13.220 SCREENING FOR HYPERLIPIDEMIA: ICD-10-CM

## 2024-08-01 DIAGNOSIS — Z12.5 SCREENING PSA (PROSTATE SPECIFIC ANTIGEN): ICD-10-CM

## 2024-08-01 DIAGNOSIS — F17.200 TOBACCO USE DISORDER: Chronic | ICD-10-CM

## 2024-08-01 DIAGNOSIS — F32.A DEPRESSION, UNSPECIFIED DEPRESSION TYPE: Primary | ICD-10-CM

## 2024-08-01 DIAGNOSIS — I10 ESSENTIAL HYPERTENSION: ICD-10-CM

## 2024-08-01 DIAGNOSIS — Z87.891 PERSONAL HISTORY OF TOBACCO USE: ICD-10-CM

## 2024-08-01 PROCEDURE — 3075F SYST BP GE 130 - 139MM HG: CPT | Performed by: PHYSICIAN ASSISTANT

## 2024-08-01 PROCEDURE — 1123F ACP DISCUSS/DSCN MKR DOCD: CPT | Performed by: PHYSICIAN ASSISTANT

## 2024-08-01 PROCEDURE — 3079F DIAST BP 80-89 MM HG: CPT | Performed by: PHYSICIAN ASSISTANT

## 2024-08-01 PROCEDURE — 99214 OFFICE O/P EST MOD 30 MIN: CPT | Performed by: PHYSICIAN ASSISTANT

## 2024-08-01 RX ORDER — BUPROPION HYDROCHLORIDE 150 MG/1
150 TABLET, FILM COATED, EXTENDED RELEASE ORAL 2 TIMES DAILY
Qty: 180 TABLET | Refills: 1 | Status: SHIPPED | OUTPATIENT
Start: 2024-08-01

## 2024-08-01 RX ORDER — BUPROPION HYDROCHLORIDE 150 MG/1
150 TABLET, FILM COATED, EXTENDED RELEASE ORAL 2 TIMES DAILY
Qty: 180 TABLET | Refills: 1 | Status: SHIPPED | OUTPATIENT
Start: 2024-08-01 | End: 2024-08-01

## 2024-08-01 SDOH — ECONOMIC STABILITY: INCOME INSECURITY: HOW HARD IS IT FOR YOU TO PAY FOR THE VERY BASICS LIKE FOOD, HOUSING, MEDICAL CARE, AND HEATING?: NOT VERY HARD

## 2024-08-01 SDOH — ECONOMIC STABILITY: FOOD INSECURITY: WITHIN THE PAST 12 MONTHS, THE FOOD YOU BOUGHT JUST DIDN'T LAST AND YOU DIDN'T HAVE MONEY TO GET MORE.: NEVER TRUE

## 2024-08-01 SDOH — ECONOMIC STABILITY: FOOD INSECURITY: WITHIN THE PAST 12 MONTHS, YOU WORRIED THAT YOUR FOOD WOULD RUN OUT BEFORE YOU GOT MONEY TO BUY MORE.: NEVER TRUE

## 2024-08-01 ASSESSMENT — PATIENT HEALTH QUESTIONNAIRE - PHQ9

## 2024-08-01 ASSESSMENT — ENCOUNTER SYMPTOMS
CHEST TIGHTNESS: 0
SINUS PRESSURE: 0
DIARRHEA: 0
SHORTNESS OF BREATH: 0
NAUSEA: 0
ABDOMINAL PAIN: 0
SORE THROAT: 0
COUGH: 0
SINUS PAIN: 0
BACK PAIN: 0
VOMITING: 0

## 2024-08-01 ASSESSMENT — LIFESTYLE VARIABLES
HOW OFTEN DO YOU HAVE A DRINK CONTAINING ALCOHOL: NEVER
HOW MANY STANDARD DRINKS CONTAINING ALCOHOL DO YOU HAVE ON A TYPICAL DAY: PATIENT DOES NOT DRINK

## 2024-08-01 ASSESSMENT — VISUAL ACUITY: OU: 1

## 2024-08-01 NOTE — PROGRESS NOTES
Medicare Annual Wellness Visit    Paolo Devine is here for Medicare AWV (Pt here to complete AWV)    Assessment & Plan   Medicare annual wellness visit, subsequent  Personal history of tobacco use  -     AR VISIT TO DISCUSS LUNG CA SCREEN W LDCT  -     CT Lung Screen (Initial/Annual/Baseline); Future  Recommendations for Preventive Services Due: see orders and patient instructions/AVS.  Recommended screening schedule for the next 5-10 years is provided to the patient in written form: see Patient Instructions/AVS.     Return in 6 months (on 2/1/2025).     Subjective       Patient's complete Health Risk Assessment and screening values have been reviewed and are found in Flowsheets. The following problems were reviewed today and where indicated follow up appointments were made and/or referrals ordered.    Positive Risk Factor Screenings with Interventions:                  Abnormal BMI (obese):  There is no height or weight on file to calculate BMI. (!) Abnormal  Interventions:  Patient advised to follow-up in this office for further evaluation and treatment  low carbohydrate diet, exercise for at least 150 minutes/week, wear a pedometer and walk at least 10,000 steps/day           Safety:  Do you have non-slip mats or non-slip surfaces or shower bars or grab bars in your shower or bathtub?: (!) No  Interventions:  Patient declined any further interventions or treatment     Advanced Directives:  Do you have a Living Will?: (!) No    Intervention:  has NO advanced directive - information provided        Tobacco Use:    Tobacco Use      Smoking status: Every Day        Packs/day: 1.00        Years: 1 pack/day for 53.6 years (53.6 ttl pk-yrs)        Types: Cigarettes, Cigars        Start date: 1971      Smokeless tobacco: Never      Tobacco comments: Smokes 5 cigars a week     Interventions:  Start wellbutrin.                      Objective   There were no vitals filed for this visit.   There is no height or weight on

## 2024-08-01 NOTE — PATIENT INSTRUCTIONS
need 5536-2399 mg of calcium and 0686-7097 IU of vitamin D per day. It is possible to meet your calcium requirement with diet alone, but a vitamin D supplement is usually necessary to meet this goal.  When exposed to the sun, use a sunscreen that protects against both UVA and UVB radiation with an SPF of 30 or greater. Reapply every 2 to 3 hours or after sweating, drying off with a towel, or swimming.  Always wear a seat belt when traveling in a car. Always wear a helmet when riding a bicycle or motorcycle.

## 2024-08-10 DIAGNOSIS — I10 ESSENTIAL HYPERTENSION: ICD-10-CM

## 2024-08-12 RX ORDER — METOPROLOL SUCCINATE 100 MG/1
TABLET, EXTENDED RELEASE ORAL
Qty: 30 TABLET | Refills: 0 | Status: SHIPPED | OUTPATIENT
Start: 2024-08-12 | End: 2024-08-14 | Stop reason: SDUPTHER

## 2024-08-12 NOTE — TELEPHONE ENCOUNTER
Comments:     Last Office Visit (last PCP visit):   8/1/2024    Next Visit Date:  Future Appointments   Date Time Provider Department Center   2/3/2025  8:15 AM Aminta Pepper PA [facility]       **If hasn't been seen in over a year OR hasn't followed up according to last diabetes/ADHD visit, make appointment for patient before sending refill to provider.    Rx requested:  Requested Prescriptions     Pending Prescriptions Disp Refills    metoprolol succinate (TOPROL XL) 100 MG extended release tablet [Pharmacy Med Name: Metoprolol Succinate ER Oral Tablet Extended Release 24 Hour 100 MG] 30 tablet 0     Sig: TAKE ONE TABLET BY MOUTH EVERY DAY

## 2024-08-13 ENCOUNTER — PATIENT MESSAGE (OUTPATIENT)
Dept: FAMILY MEDICINE CLINIC | Age: 69
End: 2024-08-13

## 2024-08-13 DIAGNOSIS — I10 ESSENTIAL HYPERTENSION: ICD-10-CM

## 2024-08-14 RX ORDER — METOPROLOL SUCCINATE 100 MG/1
TABLET, EXTENDED RELEASE ORAL
Qty: 30 TABLET | Refills: 5 | Status: SHIPPED | OUTPATIENT
Start: 2024-08-14

## 2024-09-26 DIAGNOSIS — E78.2 MIXED HYPERLIPIDEMIA: ICD-10-CM

## 2024-09-26 DIAGNOSIS — I10 ESSENTIAL HYPERTENSION: ICD-10-CM

## 2024-09-26 RX ORDER — ATORVASTATIN CALCIUM 20 MG/1
TABLET, FILM COATED ORAL
Qty: 30 TABLET | Refills: 5 | Status: CANCELLED | OUTPATIENT
Start: 2024-09-26

## 2024-09-27 RX ORDER — METOPROLOL SUCCINATE 100 MG/1
TABLET, EXTENDED RELEASE ORAL
Qty: 90 TABLET | Refills: 1 | Status: SHIPPED | OUTPATIENT
Start: 2024-09-27

## 2024-09-27 RX ORDER — ATORVASTATIN CALCIUM 20 MG/1
TABLET, FILM COATED ORAL
Qty: 90 TABLET | Refills: 1 | Status: SHIPPED | OUTPATIENT
Start: 2024-09-27

## 2025-01-30 NOTE — PROGRESS NOTES
Subjective  Paolo Devine 1955 is a 69 y.o. male who presents today with:  Chief Complaint   Patient presents with    Follow-up       HPI    Smoking Cessation  - has tried nicotine replacement therapy in the past with no success. Notes he needs to stop smoking to help support his wife's and personal health.   - Patient would like to start pharmacological treatment. Has not been on chantix in the past. Using with accupuncture. Chantix is helping but getting angry and feels depression     HTN/SVT  Patient is here for f/u HTN. Is compliant with meds and has no side effects from them. Avoids added salt. Tries to eat healthy. Exercises occasionally. Has no chest pain, shortness of breath, palpitations or edema. Patient denies palpitations or tachycardia.     Pre-diabetic   - POCT A1c 5.9%  - having occasional lightheadness in the afternoons. Sometimes change in his vision on the left side. Patient notes it happening after he hasn't eaten in a while. Sometimes will get shaky as well. Eating helps resolve the problem. No HA, unilateral weakness, or pain with the effected eye.   - patient is eating better.   Review of Systems   Constitutional:  Negative for activity change, appetite change, chills and fever.   HENT:  Negative for congestion, drooling, sinus pressure, sinus pain and sore throat.    Eyes:  Negative for visual disturbance.   Respiratory:  Negative for cough, chest tightness and shortness of breath.    Cardiovascular:  Negative for chest pain.   Gastrointestinal:  Negative for abdominal pain, diarrhea, nausea and vomiting.   Endocrine: Negative for cold intolerance.   Genitourinary:  Negative for dysuria, flank pain, frequency and hematuria.   Musculoskeletal:  Negative for arthralgias and back pain.   Skin:  Negative for rash.   Allergic/Immunologic: Negative for food allergies.   Neurological:  Negative for weakness, light-headedness, numbness and headaches.   Hematological:  Does not bruise/bleed

## 2025-02-02 SDOH — ECONOMIC STABILITY: FOOD INSECURITY: WITHIN THE PAST 12 MONTHS, YOU WORRIED THAT YOUR FOOD WOULD RUN OUT BEFORE YOU GOT MONEY TO BUY MORE.: NEVER TRUE

## 2025-02-02 SDOH — ECONOMIC STABILITY: FOOD INSECURITY: WITHIN THE PAST 12 MONTHS, THE FOOD YOU BOUGHT JUST DIDN'T LAST AND YOU DIDN'T HAVE MONEY TO GET MORE.: NEVER TRUE

## 2025-02-02 SDOH — ECONOMIC STABILITY: INCOME INSECURITY: IN THE LAST 12 MONTHS, WAS THERE A TIME WHEN YOU WERE NOT ABLE TO PAY THE MORTGAGE OR RENT ON TIME?: NO

## 2025-02-02 SDOH — ECONOMIC STABILITY: TRANSPORTATION INSECURITY
IN THE PAST 12 MONTHS, HAS THE LACK OF TRANSPORTATION KEPT YOU FROM MEDICAL APPOINTMENTS OR FROM GETTING MEDICATIONS?: NO

## 2025-02-03 ENCOUNTER — OFFICE VISIT (OUTPATIENT)
Age: 70
End: 2025-02-03
Payer: MEDICARE

## 2025-02-03 VITALS
TEMPERATURE: 97.8 F | HEIGHT: 68 IN | SYSTOLIC BLOOD PRESSURE: 138 MMHG | DIASTOLIC BLOOD PRESSURE: 80 MMHG | HEART RATE: 73 BPM | BODY MASS INDEX: 32.77 KG/M2 | OXYGEN SATURATION: 100 % | WEIGHT: 216.2 LBS | RESPIRATION RATE: 16 BRPM

## 2025-02-03 DIAGNOSIS — I47.10 PSVT (PAROXYSMAL SUPRAVENTRICULAR TACHYCARDIA) (HCC): ICD-10-CM

## 2025-02-03 DIAGNOSIS — I10 ESSENTIAL HYPERTENSION: Primary | ICD-10-CM

## 2025-02-03 DIAGNOSIS — Z23 NEED FOR PNEUMOCOCCAL 20-VALENT CONJUGATE VACCINATION: ICD-10-CM

## 2025-02-03 DIAGNOSIS — R73.03 PRE-DIABETES: ICD-10-CM

## 2025-02-03 PROCEDURE — 1125F AMNT PAIN NOTED PAIN PRSNT: CPT | Performed by: PHYSICIAN ASSISTANT

## 2025-02-03 PROCEDURE — 90677 PCV20 VACCINE IM: CPT | Performed by: PHYSICIAN ASSISTANT

## 2025-02-03 PROCEDURE — 3075F SYST BP GE 130 - 139MM HG: CPT | Performed by: PHYSICIAN ASSISTANT

## 2025-02-03 PROCEDURE — 1123F ACP DISCUSS/DSCN MKR DOCD: CPT | Performed by: PHYSICIAN ASSISTANT

## 2025-02-03 PROCEDURE — 3079F DIAST BP 80-89 MM HG: CPT | Performed by: PHYSICIAN ASSISTANT

## 2025-02-03 PROCEDURE — 1159F MED LIST DOCD IN RCRD: CPT | Performed by: PHYSICIAN ASSISTANT

## 2025-02-03 PROCEDURE — 99214 OFFICE O/P EST MOD 30 MIN: CPT | Performed by: PHYSICIAN ASSISTANT

## 2025-02-03 PROCEDURE — 1160F RVW MEDS BY RX/DR IN RCRD: CPT | Performed by: PHYSICIAN ASSISTANT

## 2025-02-03 PROCEDURE — G0009 ADMIN PNEUMOCOCCAL VACCINE: HCPCS | Performed by: PHYSICIAN ASSISTANT

## 2025-02-03 ASSESSMENT — ENCOUNTER SYMPTOMS
COUGH: 0
DIARRHEA: 0
NAUSEA: 0
CHEST TIGHTNESS: 0
VOMITING: 0
SHORTNESS OF BREATH: 0
BACK PAIN: 0
SINUS PAIN: 0
ABDOMINAL PAIN: 0
SINUS PRESSURE: 0
SORE THROAT: 0

## 2025-02-03 ASSESSMENT — PATIENT HEALTH QUESTIONNAIRE - PHQ9
SUM OF ALL RESPONSES TO PHQ QUESTIONS 1-9: 0
SUM OF ALL RESPONSES TO PHQ9 QUESTIONS 1 & 2: 0
4. FEELING TIRED OR HAVING LITTLE ENERGY: NOT AT ALL
2. FEELING DOWN, DEPRESSED OR HOPELESS: NOT AT ALL
6. FEELING BAD ABOUT YOURSELF - OR THAT YOU ARE A FAILURE OR HAVE LET YOURSELF OR YOUR FAMILY DOWN: NOT AT ALL
10. IF YOU CHECKED OFF ANY PROBLEMS, HOW DIFFICULT HAVE THESE PROBLEMS MADE IT FOR YOU TO DO YOUR WORK, TAKE CARE OF THINGS AT HOME, OR GET ALONG WITH OTHER PEOPLE: NOT DIFFICULT AT ALL
5. POOR APPETITE OR OVEREATING: NOT AT ALL
9. THOUGHTS THAT YOU WOULD BE BETTER OFF DEAD, OR OF HURTING YOURSELF: NOT AT ALL
SUM OF ALL RESPONSES TO PHQ QUESTIONS 1-9: 0
7. TROUBLE CONCENTRATING ON THINGS, SUCH AS READING THE NEWSPAPER OR WATCHING TELEVISION: NOT AT ALL
SUM OF ALL RESPONSES TO PHQ QUESTIONS 1-9: 0
8. MOVING OR SPEAKING SO SLOWLY THAT OTHER PEOPLE COULD HAVE NOTICED. OR THE OPPOSITE, BEING SO FIGETY OR RESTLESS THAT YOU HAVE BEEN MOVING AROUND A LOT MORE THAN USUAL: NOT AT ALL
3. TROUBLE FALLING OR STAYING ASLEEP: NOT AT ALL
1. LITTLE INTEREST OR PLEASURE IN DOING THINGS: NOT AT ALL
SUM OF ALL RESPONSES TO PHQ QUESTIONS 1-9: 0

## 2025-02-03 ASSESSMENT — VISUAL ACUITY: OU: 1

## 2025-02-04 DIAGNOSIS — R42 LIGHTHEADEDNESS: ICD-10-CM

## 2025-02-04 DIAGNOSIS — Z12.5 SCREENING PSA (PROSTATE SPECIFIC ANTIGEN): ICD-10-CM

## 2025-02-04 DIAGNOSIS — R73.03 PRE-DIABETES: ICD-10-CM

## 2025-02-04 DIAGNOSIS — Z13.220 SCREENING FOR HYPERLIPIDEMIA: ICD-10-CM

## 2025-02-04 LAB
ALBUMIN SERPL-MCNC: 4.2 G/DL (ref 3.5–4.6)
ALP SERPL-CCNC: 95 U/L (ref 35–104)
ALT SERPL-CCNC: 24 U/L (ref 0–41)
ANION GAP SERPL CALCULATED.3IONS-SCNC: 9 MEQ/L (ref 9–15)
AST SERPL-CCNC: 21 U/L (ref 0–40)
BILIRUB SERPL-MCNC: 0.3 MG/DL (ref 0.2–0.7)
BUN SERPL-MCNC: 28 MG/DL (ref 8–23)
CALCIUM SERPL-MCNC: 9.2 MG/DL (ref 8.5–9.9)
CHLORIDE SERPL-SCNC: 106 MEQ/L (ref 95–107)
CHOLEST SERPL-MCNC: 139 MG/DL (ref 0–199)
CO2 SERPL-SCNC: 26 MEQ/L (ref 20–31)
CREAT SERPL-MCNC: 1.15 MG/DL (ref 0.7–1.2)
ESTIMATED AVERAGE GLUCOSE: 123 MG/DL
GLOBULIN SER CALC-MCNC: 2.7 G/DL (ref 2.3–3.5)
GLUCOSE FASTING: 111 MG/DL (ref 70–99)
HBA1C MFR BLD: 5.9 % (ref 4–6)
HDLC SERPL-MCNC: 47 MG/DL (ref 40–59)
LDLC SERPL CALC-MCNC: 73 MG/DL (ref 0–129)
POTASSIUM SERPL-SCNC: 5.4 MEQ/L (ref 3.4–4.9)
PROT SERPL-MCNC: 6.9 G/DL (ref 6.3–8)
PSA SERPL-MCNC: <0.01 NG/ML (ref 0–4)
SODIUM SERPL-SCNC: 141 MEQ/L (ref 135–144)
TRIGL SERPL-MCNC: 93 MG/DL (ref 0–150)

## 2025-02-05 DIAGNOSIS — R39.9 LOWER URINARY TRACT SYMPTOMS (LUTS): Primary | ICD-10-CM

## 2025-02-05 DIAGNOSIS — E87.5 HYPERKALEMIA: Primary | ICD-10-CM

## 2025-02-05 RX ORDER — TAMSULOSIN HYDROCHLORIDE 0.4 MG/1
0.4 CAPSULE ORAL DAILY
Qty: 90 CAPSULE | Refills: 1 | Status: SHIPPED | OUTPATIENT
Start: 2025-02-05

## 2025-02-06 LAB
INSULIN FREE SERPL-ACNC: 8 UIU/ML (ref 3–25)
INSULIN SERPL-ACNC: 10 UIU/ML (ref 3–25)

## 2025-02-19 ENCOUNTER — PATIENT MESSAGE (OUTPATIENT)
Age: 70
End: 2025-02-19

## 2025-02-19 DIAGNOSIS — R06.02 SOB (SHORTNESS OF BREATH): Primary | ICD-10-CM

## 2025-02-19 DIAGNOSIS — Z01.811 PRE-OP CHEST EXAM: ICD-10-CM

## 2025-04-05 DIAGNOSIS — E78.2 MIXED HYPERLIPIDEMIA: ICD-10-CM

## 2025-04-05 DIAGNOSIS — I10 ESSENTIAL HYPERTENSION: ICD-10-CM

## 2025-04-05 NOTE — TELEPHONE ENCOUNTER
Comments:     Last Office Visit (last PCP visit):   2/3/2025    Next Visit Date:  Future Appointments   Date Time Provider Department Center   8/4/2025  9:00 AM Aminta Pepper PA [facility] DE[facility]         Rx requested:  Requested Prescriptions     Pending Prescriptions Disp Refills    metoprolol succinate (TOPROL XL) 100 MG extended release tablet [Pharmacy Med Name: Metoprolol Succinate ER Oral Tablet Extended Release 24 Hour 100 MG] 90 tablet 0     Sig: Take 1 tablet by mouth daily    atorvastatin (LIPITOR) 20 MG tablet [Pharmacy Med Name: Atorvastatin Calcium Oral Tablet 20 MG] 90 tablet 0     Sig: Take 1 tablet by mouth daily

## 2025-04-07 RX ORDER — ATORVASTATIN CALCIUM 20 MG/1
20 TABLET, FILM COATED ORAL DAILY
Qty: 90 TABLET | Refills: 1 | Status: SHIPPED | OUTPATIENT
Start: 2025-04-07

## 2025-04-07 RX ORDER — METOPROLOL SUCCINATE 100 MG/1
100 TABLET, EXTENDED RELEASE ORAL DAILY
Qty: 90 TABLET | Refills: 1 | Status: SHIPPED | OUTPATIENT
Start: 2025-04-07

## 2025-08-04 ENCOUNTER — OFFICE VISIT (OUTPATIENT)
Age: 70
End: 2025-08-04
Payer: MEDICARE

## 2025-08-04 VITALS
HEART RATE: 57 BPM | HEIGHT: 68 IN | SYSTOLIC BLOOD PRESSURE: 136 MMHG | WEIGHT: 216 LBS | BODY MASS INDEX: 32.74 KG/M2 | OXYGEN SATURATION: 98 % | DIASTOLIC BLOOD PRESSURE: 80 MMHG

## 2025-08-04 DIAGNOSIS — R42 LIGHTHEADEDNESS: ICD-10-CM

## 2025-08-04 DIAGNOSIS — Z87.891 PERSONAL HISTORY OF TOBACCO USE: ICD-10-CM

## 2025-08-04 DIAGNOSIS — Z00.00 MEDICARE ANNUAL WELLNESS VISIT, SUBSEQUENT: Primary | ICD-10-CM

## 2025-08-04 DIAGNOSIS — R09.89 BRUIT OF RIGHT CAROTID ARTERY: ICD-10-CM

## 2025-08-04 DIAGNOSIS — I47.10 PSVT (PAROXYSMAL SUPRAVENTRICULAR TACHYCARDIA): ICD-10-CM

## 2025-08-04 DIAGNOSIS — I10 ESSENTIAL HYPERTENSION: ICD-10-CM

## 2025-08-04 DIAGNOSIS — E87.5 HYPERKALEMIA: ICD-10-CM

## 2025-08-04 DIAGNOSIS — R73.03 PRE-DIABETES: ICD-10-CM

## 2025-08-04 LAB
ALBUMIN SERPL-MCNC: 4.7 G/DL (ref 3.5–4.6)
ALP SERPL-CCNC: 97 U/L (ref 35–104)
ALT SERPL-CCNC: 29 U/L (ref 0–41)
ANION GAP SERPL CALCULATED.3IONS-SCNC: 14 MEQ/L (ref 9–15)
AST SERPL-CCNC: 33 U/L (ref 0–40)
BASOPHILS # BLD: 0 K/UL (ref 0–0.2)
BASOPHILS NFR BLD: 0.3 %
BILIRUB SERPL-MCNC: 0.7 MG/DL (ref 0.2–0.7)
BUN SERPL-MCNC: 31 MG/DL (ref 8–23)
CALCIUM SERPL-MCNC: 9.8 MG/DL (ref 8.5–9.9)
CHLORIDE SERPL-SCNC: 107 MEQ/L (ref 95–107)
CO2 SERPL-SCNC: 21 MEQ/L (ref 20–31)
CREAT SERPL-MCNC: 1.35 MG/DL (ref 0.7–1.2)
EOSINOPHIL # BLD: 0.3 K/UL (ref 0–0.7)
EOSINOPHIL NFR BLD: 4 %
ERYTHROCYTE [DISTWIDTH] IN BLOOD BY AUTOMATED COUNT: 14.3 % (ref 11.5–14.5)
GLOBULIN SER CALC-MCNC: 2.6 G/DL (ref 2.3–3.5)
GLUCOSE SERPL-MCNC: 113 MG/DL (ref 70–99)
HCT VFR BLD AUTO: 46 % (ref 42–52)
HGB BLD-MCNC: 14.9 G/DL (ref 14–18)
LYMPHOCYTES # BLD: 1.5 K/UL (ref 1–4.8)
LYMPHOCYTES NFR BLD: 23.7 %
MCH RBC QN AUTO: 28.7 PG (ref 27–31.3)
MCHC RBC AUTO-ENTMCNC: 32.4 % (ref 33–37)
MCV RBC AUTO: 88.6 FL (ref 79–92.2)
MONOCYTES # BLD: 0.7 K/UL (ref 0.2–0.8)
MONOCYTES NFR BLD: 10.6 %
NEUTROPHILS # BLD: 3.8 K/UL (ref 1.4–6.5)
NEUTS SEG NFR BLD: 61.1 %
PLATELET # BLD AUTO: 253 K/UL (ref 130–400)
POTASSIUM SERPL-SCNC: 5.1 MEQ/L (ref 3.4–4.9)
PROT SERPL-MCNC: 7.3 G/DL (ref 6.3–8)
RBC # BLD AUTO: 5.19 M/UL (ref 4.7–6.1)
SODIUM SERPL-SCNC: 142 MEQ/L (ref 135–144)
WBC # BLD AUTO: 6.2 K/UL (ref 4.8–10.8)

## 2025-08-04 PROCEDURE — G0439 PPPS, SUBSEQ VISIT: HCPCS | Performed by: PHYSICIAN ASSISTANT

## 2025-08-04 PROCEDURE — 1126F AMNT PAIN NOTED NONE PRSNT: CPT | Performed by: PHYSICIAN ASSISTANT

## 2025-08-04 PROCEDURE — G0296 VISIT TO DETERM LDCT ELIG: HCPCS | Performed by: PHYSICIAN ASSISTANT

## 2025-08-04 PROCEDURE — 1123F ACP DISCUSS/DSCN MKR DOCD: CPT | Performed by: PHYSICIAN ASSISTANT

## 2025-08-04 PROCEDURE — 3079F DIAST BP 80-89 MM HG: CPT | Performed by: PHYSICIAN ASSISTANT

## 2025-08-04 PROCEDURE — 99213 OFFICE O/P EST LOW 20 MIN: CPT | Performed by: PHYSICIAN ASSISTANT

## 2025-08-04 PROCEDURE — 1159F MED LIST DOCD IN RCRD: CPT | Performed by: PHYSICIAN ASSISTANT

## 2025-08-04 PROCEDURE — 1160F RVW MEDS BY RX/DR IN RCRD: CPT | Performed by: PHYSICIAN ASSISTANT

## 2025-08-04 PROCEDURE — 3075F SYST BP GE 130 - 139MM HG: CPT | Performed by: PHYSICIAN ASSISTANT

## 2025-08-04 ASSESSMENT — PATIENT HEALTH QUESTIONNAIRE - PHQ9
SUM OF ALL RESPONSES TO PHQ QUESTIONS 1-9: 0
2. FEELING DOWN, DEPRESSED OR HOPELESS: NOT AT ALL
4. FEELING TIRED OR HAVING LITTLE ENERGY: NOT AT ALL
SUM OF ALL RESPONSES TO PHQ QUESTIONS 1-9: 0
1. LITTLE INTEREST OR PLEASURE IN DOING THINGS: NOT AT ALL
8. MOVING OR SPEAKING SO SLOWLY THAT OTHER PEOPLE COULD HAVE NOTICED. OR THE OPPOSITE, BEING SO FIGETY OR RESTLESS THAT YOU HAVE BEEN MOVING AROUND A LOT MORE THAN USUAL: NOT AT ALL
10. IF YOU CHECKED OFF ANY PROBLEMS, HOW DIFFICULT HAVE THESE PROBLEMS MADE IT FOR YOU TO DO YOUR WORK, TAKE CARE OF THINGS AT HOME, OR GET ALONG WITH OTHER PEOPLE: NOT DIFFICULT AT ALL
7. TROUBLE CONCENTRATING ON THINGS, SUCH AS READING THE NEWSPAPER OR WATCHING TELEVISION: NOT AT ALL
6. FEELING BAD ABOUT YOURSELF - OR THAT YOU ARE A FAILURE OR HAVE LET YOURSELF OR YOUR FAMILY DOWN: NOT AT ALL
3. TROUBLE FALLING OR STAYING ASLEEP: NOT AT ALL
SUM OF ALL RESPONSES TO PHQ QUESTIONS 1-9: 0
5. POOR APPETITE OR OVEREATING: NOT AT ALL
9. THOUGHTS THAT YOU WOULD BE BETTER OFF DEAD, OR OF HURTING YOURSELF: NOT AT ALL
SUM OF ALL RESPONSES TO PHQ QUESTIONS 1-9: 0

## 2025-08-05 LAB
C PEPTIDE SERPL-MCNC: 2.6 NG/ML (ref 1.1–4.4)
ESTIMATED AVERAGE GLUCOSE: 128 MG/DL
HBA1C MFR BLD: 6.1 % (ref 4–6)

## 2025-08-06 ENCOUNTER — RESULTS FOLLOW-UP (OUTPATIENT)
Age: 70
End: 2025-08-06

## 2025-08-06 LAB
INSULIN FREE SERPL-ACNC: 5 UIU/ML (ref 3–25)
INSULIN SERPL-ACNC: 7 UIU/ML (ref 3–25)

## 2025-08-18 ENCOUNTER — HOSPITAL ENCOUNTER (OUTPATIENT)
Dept: CT IMAGING | Age: 70
Discharge: HOME | End: 2025-08-20
Payer: MEDICARE

## 2025-08-18 ENCOUNTER — HOSPITAL ENCOUNTER (OUTPATIENT)
Dept: ULTRASOUND IMAGING | Age: 70
Discharge: HOME | End: 2025-08-20
Payer: MEDICARE

## 2025-08-18 DIAGNOSIS — R09.89 BRUIT OF RIGHT CAROTID ARTERY: ICD-10-CM

## 2025-08-18 DIAGNOSIS — Z87.891 PERSONAL HISTORY OF TOBACCO USE: ICD-10-CM

## 2025-08-18 DIAGNOSIS — R42 LIGHTHEADEDNESS: ICD-10-CM

## 2025-08-18 PROCEDURE — 71271 CT THORAX LUNG CANCER SCR C-: CPT

## 2025-08-18 PROCEDURE — 93880 EXTRACRANIAL BILAT STUDY: CPT

## 2025-08-19 ENCOUNTER — PATIENT MESSAGE (OUTPATIENT)
Age: 70
End: 2025-08-19

## 2025-08-19 DIAGNOSIS — R42 DIZZINESS: ICD-10-CM

## 2025-08-19 DIAGNOSIS — F40.240 CLAUSTROPHOBIA: ICD-10-CM

## 2025-08-19 DIAGNOSIS — R42 LIGHTHEADEDNESS: Primary | ICD-10-CM

## 2025-08-19 DIAGNOSIS — R26.81 UNSTEADINESS: ICD-10-CM

## 2025-08-21 RX ORDER — LORAZEPAM 2 MG/1
TABLET ORAL
Qty: 2 TABLET | Refills: 0 | Status: SHIPPED | OUTPATIENT
Start: 2025-08-27 | End: 2025-08-29

## 2025-08-30 ENCOUNTER — PATIENT MESSAGE (OUTPATIENT)
Age: 70
End: 2025-08-30

## 2025-08-30 DIAGNOSIS — R39.9 LOWER URINARY TRACT SYMPTOMS (LUTS): ICD-10-CM

## 2025-08-30 DIAGNOSIS — F32.A DEPRESSION, UNSPECIFIED DEPRESSION TYPE: ICD-10-CM

## 2025-08-30 DIAGNOSIS — F17.200 TOBACCO USE DISORDER: Chronic | ICD-10-CM

## 2025-09-02 RX ORDER — BUPROPION HYDROCHLORIDE 150 MG/1
150 TABLET, FILM COATED, EXTENDED RELEASE ORAL 2 TIMES DAILY
Qty: 180 TABLET | Refills: 1 | Status: SHIPPED | OUTPATIENT
Start: 2025-09-02

## 2025-09-02 RX ORDER — TAMSULOSIN HYDROCHLORIDE 0.4 MG/1
0.4 CAPSULE ORAL DAILY
Qty: 90 CAPSULE | Refills: 1 | Status: SHIPPED | OUTPATIENT
Start: 2025-09-02

## 2025-09-04 ENCOUNTER — HOSPITAL ENCOUNTER (OUTPATIENT)
Dept: CT IMAGING | Age: 70
Discharge: HOME | End: 2025-09-06
Payer: MEDICARE

## 2025-09-04 DIAGNOSIS — R26.81 UNSTEADINESS: ICD-10-CM

## 2025-09-04 DIAGNOSIS — R42 DIZZINESS: ICD-10-CM

## 2025-09-04 DIAGNOSIS — R42 LIGHTHEADEDNESS: ICD-10-CM

## 2025-09-04 PROCEDURE — 6360000004 HC RX CONTRAST MEDICATION: Performed by: PHYSICIAN ASSISTANT

## 2025-09-04 PROCEDURE — 70496 CT ANGIOGRAPHY HEAD: CPT

## 2025-09-04 RX ORDER — IOPAMIDOL 755 MG/ML
75 INJECTION, SOLUTION INTRAVASCULAR
Status: COMPLETED | OUTPATIENT
Start: 2025-09-04 | End: 2025-09-04

## 2025-09-04 RX ADMIN — IOPAMIDOL 75 ML: 755 INJECTION, SOLUTION INTRAVENOUS at 09:47

## (undated) DEVICE — Z CONVERTED USE 2271043 CONTAINER SPEC COLL 4OZ SCR ON LID PEEL PCH

## (undated) DEVICE — COVER,TABLE,44X90,STERILE: Brand: MEDLINE

## (undated) DEVICE — ULTRAMIX BOWL (NEW) KNEE

## (undated) DEVICE — SPLINT KNEE UNIV FOR LESS THAN 36IN L20IN FOAM LAM E CNTCT

## (undated) DEVICE — CHLORAPREP 26ML ORANGE

## (undated) DEVICE — TIBURON TOP SHEET: Brand: CONVERTORS

## (undated) DEVICE — MONITORING NEURO W INTERPRETATION SYS PRICING

## (undated) DEVICE — SYRINGE MED 10ML LUERLOCK TIP W/O SFTY DISP

## (undated) DEVICE — DRAPE EQUIP TRNSPRT CONTAINMENT FOR BK TAB

## (undated) DEVICE — SHEET,DRAPE,53X77,STERILE: Brand: MEDLINE

## (undated) DEVICE — PACK,LAPAROTOMY,NO GOWNS: Brand: MEDLINE

## (undated) DEVICE — GAUZE,SPONGE,4"X4",16PLY,XRAY,STRL,LF: Brand: MEDLINE

## (undated) DEVICE — DRESSING BURN GAUZE 4X4IN STER

## (undated) DEVICE — SUTURE ETHBND EXCEL SZ 2 L30IN NONABSORBABLE GRN L40MM V-37 MX69G

## (undated) DEVICE — CODMAN® SURGICAL PATTIES 3/4" X 3/4" (1.91CM X 1.91CM): Brand: CODMAN®

## (undated) DEVICE — RESERVOIR,HARDSHELL,150 Μ_ NOTE: ADDITIONAL PACKAGING DI: 20812747016039, UNIT PACKAGING, CONTAINS (1) 10812747016032 DI:30812747016036, CARTON CONTAINS (4) 20812747016039: Brand: HAEMONETICS CELL SAVER SYSTEM

## (undated) DEVICE — BLOOD TRANSFUSION FILTER: Brand: HAEMONETICS

## (undated) DEVICE — BANDAGE ADH W0.75XL3IN UNIV WVN FAB NAT GEN USE STRP N ADH

## (undated) DEVICE — SUTURE VCRL SZ 2-0 L36IN ABSRB VLT L36MM CT-1 1/2 CIR J345H

## (undated) DEVICE — BRUSH ENDO COMBO

## (undated) DEVICE — ELASTIC BANDAGE: Brand: DEROYAL

## (undated) DEVICE — CRADLE POS 3X5X24IN RASPBERRY ARM PRONE FOAM DISP

## (undated) DEVICE — TRAP,MUCUS SPECIMEN, 80CC: Brand: MEDLINE

## (undated) DEVICE — ASPIRATION/ANTICOAGULATION SET: Brand: HAEMONETICS CELL SAVER SYSTEM

## (undated) DEVICE — JELLY,LUBE,STERILE,FLIP TOP,TUBE,2-OZ: Brand: MEDLINE

## (undated) DEVICE — TOTAL TRAY, DB, 100% SILI FOLEY, 16FR 10: Brand: MEDLINE

## (undated) DEVICE — AGENT HEMSTAT 1GM PORCINE GEL ABSRB PWD FOR CONT OOZING

## (undated) DEVICE — COUNTER NDL 40 COUNT HLD 70 FOAM BLK ADH W/ MAG

## (undated) DEVICE — CONTAINER,SPECIMEN,OR STERILE,4OZ: Brand: MEDLINE

## (undated) DEVICE — BLADE SAW W125XL70MM THK064MM CUT THK094MM REPL RECIP DBL

## (undated) DEVICE — Z DISCONTINUED PER MEDLINE USE 2741944 DRESSING AQUACEL 12 IN SURG W9XL30CM SIL CVR WTRPRF VIR BACT BARR ANTIMIC

## (undated) DEVICE — TOWEL,OR,DSP,ST,BLUE,STD,4/PK,20PK/CS: Brand: MEDLINE

## (undated) DEVICE — GOWN,AURORA,NONREINFORCED,LARGE: Brand: MEDLINE

## (undated) DEVICE — LABEL MED MINI W/ MARKER

## (undated) DEVICE — CS ELITE PROCESSING KIT (125ML): Brand: HAEMONETICS CELL SAVER ELITE SYSTEM

## (undated) DEVICE — 35 ML SYRINGE LUER-LOCK TIP: Brand: MONOJECT

## (undated) DEVICE — GLOVE ORANGE PI 8   MSG9080

## (undated) DEVICE — 3M™ STERI-DRAPE™ INSTRUMENT POUCH 1018: Brand: STERI-DRAPE™

## (undated) DEVICE — DIFFUSER: Brand: CORE, MAESTRO

## (undated) DEVICE — Z DISCONTINUED USE 2275686 GLOVE SURG SZ 8 L12IN FNGR THK13MIL WHT ISOLEX POLYISOPRENE

## (undated) DEVICE — MARKER SURG SKIN GENTIAN VLT REG TIP W/ 6IN RUL

## (undated) DEVICE — SYRINGE IRRIG 60ML SFT PLIABLE BLB EZ TO GRP 1 HND USE W/

## (undated) DEVICE — PACK PROCEDURE SURG TOTAL KNEE PACK

## (undated) DEVICE — NEEDLE SPINAL 22GA L3.5IN SPINOCAN

## (undated) DEVICE — SPONGE GZ W4XL4IN RAYON POLY FILL CVR W/ NONWOVEN FAB

## (undated) DEVICE — ELECTRODE PT RET AD L9FT HI MOIST COND ADH HYDRGEL CORDED

## (undated) DEVICE — Device

## (undated) DEVICE — PACK,SET UP,DRAPE: Brand: MEDLINE

## (undated) DEVICE — OIL CARTRIDGE: Brand: CORE, MAESTRO

## (undated) DEVICE — ELECTRODE BLDE L4IN NONINSULATED EDGE

## (undated) DEVICE — GLOVE SURG SZ 65 THK91MIL LTX FREE SYN POLYISOPRENE

## (undated) DEVICE — MAX-CORE® DISPOSABLE CORE BIOPSY INSTRUMENT, 18G X 20CM: Brand: MAX-CORE

## (undated) DEVICE — ENDO CARRY-ON PROCEDURE KIT: Brand: ENDO CARRY-ON PROCEDURE KIT

## (undated) DEVICE — TUBE SET 96 MM 64 MM H2O PERISTALTIC STD AUX CHANNEL

## (undated) DEVICE — GLOVE ORANGE PI 7 1/2   MSG9075

## (undated) DEVICE — SUTURE VCRL SZ 0 L27IN ABSRB VLT L48MM CTX 1/2 CIR TAPR PNT J364H

## (undated) DEVICE — SIPS DUAL 2 MINUTE TIP

## (undated) DEVICE — STERILE PATIENT PROTECTIVE PAD FOR IMP® KNEE POSITIONERS & COHESIVE WRAP (10 / CASE): Brand: DE MAYO KNEE POSITIONER®

## (undated) DEVICE — SUTURE VCRL SZ 0 L36IN ABSRB VLT L36MM CT-1 1/2 CIR J346H

## (undated) DEVICE — ALCOHOL RUBBING ISO 16OZ 70%

## (undated) DEVICE — SINGLE PORT MANIFOLD: Brand: NEPTUNE 2

## (undated) DEVICE — FORCEPS BX L240CM JAW DIA2.4MM ORNG L CAP W/ NDL DISP RAD

## (undated) DEVICE — GLOVE SURG SZ 7 L12IN FNGR THK83MIL CRM POLYISOPRENE

## (undated) DEVICE — 3M™ IOBAN™ 2 ANTIMICROBIAL INCISE DRAPE 6650EZ: Brand: IOBAN™ 2

## (undated) DEVICE — ELECTRODE ES L275IN 275IN BLDE TIP COAT PTFE TEF W EVAC

## (undated) DEVICE — 3M™ STERI-STRIP™ REINFORCED ADHESIVE SKIN CLOSURES, R1547, 1/2 IN X 4 IN (12 MM X 100 MM), 6 STRIPS/ENVELOPE: Brand: 3M™ STERI-STRIP™

## (undated) DEVICE — DRAPE SURG W41XL74IN CLR FULL SZ C ARM 3 ADH POLY STRP E

## (undated) DEVICE — APPLICATOR PVP IOD SWABSTK MED NS LF

## (undated) DEVICE — TIBURON SPLIT SHEET: Brand: CONVERTORS

## (undated) DEVICE — SUTURE VCRL SZ 3-0 L27IN ABSRB VLT CT-2 L26MM 1/2 CIR J332H

## (undated) DEVICE — BIPOLAR IRRIGATOR INTEGRATED TUBING AND BIPOLAR CORD SET, DISPOSABLE

## (undated) DEVICE — Z DISCONTINUED PATTY SURG 1X1 IN COTTONOID

## (undated) DEVICE — INTENDED FOR TISSUE SEPARATION, AND OTHER PROCEDURES THAT REQUIRE A SHARP SURGICAL BLADE TO PUNCTURE OR CUT.: Brand: BARD-PARKER ® CARBON RIB-BACK BLADES

## (undated) DEVICE — CARBIDE MATCH HEAD

## (undated) DEVICE — TTL1LYR 16FR10ML 100%SIL TMPST TR: Brand: MEDLINE

## (undated) DEVICE — CATHETER IV 14 GAX2 IN STR INTROCAN SAFETY

## (undated) DEVICE — UNDERCAST PADDING: Brand: DEROYAL

## (undated) DEVICE — TUBING, SUCTION, 1/4" X 10', STRAIGHT: Brand: MEDLINE

## (undated) DEVICE — SUTURE VCRL SZ 1 L36IN ABSRB UD L36MM CT-1 1/2 CIR J947H

## (undated) DEVICE — PAD N ADH W3XL4IN POLY COT SFT PERF FLM EASILY CUT ABSRB

## (undated) DEVICE — CODMAN® SURGICAL PATTIES 1/2" X 1/2" (1.27CM X 1.27CM): Brand: CODMAN®

## (undated) DEVICE — APPLICATOR MEDICATED 26 CC SOLUTION HI LT ORNG CHLORAPREP

## (undated) DEVICE — APPLICATOR MEDICATED 10.5 CC SOLUTION HI LT ORNG CHLORAPREP

## (undated) DEVICE — HYPODERMIC SAFETY NEEDLE: Brand: MAGELLAN

## (undated) DEVICE — Device: Brand: ENDO SMARTCAP

## (undated) DEVICE — WAX SURG 2.5GM HEMSTAT BNE BEESWAX PARAFFIN ISO PALMITATE

## (undated) DEVICE — 3M™ STERI-DRAPE™ U-DRAPE 1015: Brand: STERI-DRAPE™

## (undated) DEVICE — PAD,NON-ADHERENT,3X8,STERILE,LF,1/PK: Brand: MEDLINE

## (undated) DEVICE — 1010 S-DRAPE TOWEL DRAPE 10/BX: Brand: STERI-DRAPE™

## (undated) DEVICE — 4-PORT MANIFOLD: Brand: NEPTUNE 2

## (undated) DEVICE — T4 HOOD

## (undated) DEVICE — Device: Brand: STABLECUT®

## (undated) DEVICE — PAD PREP M ISO ALC 2 PLY NONWOVEN SFT ABSRB

## (undated) DEVICE — GLOVE SURG SZ 8 L12IN FNGR THK94MIL TRNSLUC YEL LTX HYDRGEL

## (undated) DEVICE — SUTURE MCRYL + SZ 3-0 L36IN ABSRB UD CT-1 L36MM 1/2 CIR MCP944H

## (undated) DEVICE — CODMAN® SURGICAL PATTIES 1/2" X 3" (1.27CM X 7.62CM): Brand: CODMAN®

## (undated) DEVICE — SPONGE,NEURO,1"X3",XR,STRL,LF,10/PK: Brand: MEDLINE

## (undated) DEVICE — PENCIL SMOKE EVAC PUSH BUTTON COATED

## (undated) DEVICE — COVER LT HNDL BLU PLAS

## (undated) DEVICE — CODMAN® SURGICAL PATTIES 1/2" X1 1/2" (1.27CM X 3.81CM): Brand: CODMAN®

## (undated) DEVICE — SUTURE MCRYL SZ 4-0 L27IN ABSRB UD L19MM PS-2 1/2 CIR PRIM Y426H